# Patient Record
Sex: MALE | HISPANIC OR LATINO | Employment: OTHER | URBAN - METROPOLITAN AREA
[De-identification: names, ages, dates, MRNs, and addresses within clinical notes are randomized per-mention and may not be internally consistent; named-entity substitution may affect disease eponyms.]

---

## 2020-05-06 ENCOUNTER — HOSPITAL ENCOUNTER (OUTPATIENT)
Facility: MEDICAL CENTER | Age: 65
End: 2020-05-07
Attending: EMERGENCY MEDICINE | Admitting: HOSPITALIST
Payer: COMMERCIAL

## 2020-05-06 DIAGNOSIS — R73.9 HYPERGLYCEMIA: ICD-10-CM

## 2020-05-06 LAB
ALBUMIN SERPL BCP-MCNC: 4 G/DL (ref 3.2–4.9)
ALBUMIN/GLOB SERPL: 1.4 G/DL
ALP SERPL-CCNC: 162 U/L (ref 30–99)
ALT SERPL-CCNC: 19 U/L (ref 2–50)
ANION GAP SERPL CALC-SCNC: 13 MMOL/L (ref 7–16)
APPEARANCE UR: CLEAR
AST SERPL-CCNC: 16 U/L (ref 12–45)
BASOPHILS # BLD AUTO: 0.7 % (ref 0–1.8)
BASOPHILS # BLD: 0.05 K/UL (ref 0–0.12)
BILIRUB SERPL-MCNC: 0.6 MG/DL (ref 0.1–1.5)
BILIRUB UR QL STRIP.AUTO: NEGATIVE
BUN SERPL-MCNC: 16 MG/DL (ref 8–22)
CALCIUM SERPL-MCNC: 8.7 MG/DL (ref 8.5–10.5)
CHLORIDE SERPL-SCNC: 90 MMOL/L (ref 96–112)
CO2 SERPL-SCNC: 23 MMOL/L (ref 20–33)
COLOR UR: YELLOW
CREAT SERPL-MCNC: 0.97 MG/DL (ref 0.5–1.4)
EOSINOPHIL # BLD AUTO: 0.46 K/UL (ref 0–0.51)
EOSINOPHIL NFR BLD: 6.2 % (ref 0–6.9)
ERYTHROCYTE [DISTWIDTH] IN BLOOD BY AUTOMATED COUNT: 38.8 FL (ref 35.9–50)
GLOBULIN SER CALC-MCNC: 2.9 G/DL (ref 1.9–3.5)
GLUCOSE BLD-MCNC: 333 MG/DL (ref 65–99)
GLUCOSE BLD-MCNC: 453 MG/DL (ref 65–99)
GLUCOSE BLD-MCNC: 520 MG/DL (ref 65–99)
GLUCOSE BLD-MCNC: >600 MG/DL (ref 65–99)
GLUCOSE SERPL-MCNC: 672 MG/DL (ref 65–99)
GLUCOSE UR STRIP.AUTO-MCNC: >=1000 MG/DL
HCT VFR BLD AUTO: 45.8 % (ref 42–52)
HGB BLD-MCNC: 16.5 G/DL (ref 14–18)
IMM GRANULOCYTES # BLD AUTO: 0.02 K/UL (ref 0–0.11)
IMM GRANULOCYTES NFR BLD AUTO: 0.3 % (ref 0–0.9)
KETONES UR STRIP.AUTO-MCNC: NEGATIVE MG/DL
LEUKOCYTE ESTERASE UR QL STRIP.AUTO: NEGATIVE
LYMPHOCYTES # BLD AUTO: 1.98 K/UL (ref 1–4.8)
LYMPHOCYTES NFR BLD: 26.8 % (ref 22–41)
MAGNESIUM SERPL-MCNC: 1.8 MG/DL (ref 1.5–2.5)
MCH RBC QN AUTO: 30.1 PG (ref 27–33)
MCHC RBC AUTO-ENTMCNC: 36 G/DL (ref 33.7–35.3)
MCV RBC AUTO: 83.6 FL (ref 81.4–97.8)
MICRO URNS: ABNORMAL
MONOCYTES # BLD AUTO: 0.69 K/UL (ref 0–0.85)
MONOCYTES NFR BLD AUTO: 9.3 % (ref 0–13.4)
NEUTROPHILS # BLD AUTO: 4.19 K/UL (ref 1.82–7.42)
NEUTROPHILS NFR BLD: 56.7 % (ref 44–72)
NITRITE UR QL STRIP.AUTO: NEGATIVE
NRBC # BLD AUTO: 0 K/UL
NRBC BLD-RTO: 0 /100 WBC
PH UR STRIP.AUTO: 5 [PH] (ref 5–8)
PLATELET # BLD AUTO: 212 K/UL (ref 164–446)
PMV BLD AUTO: 11.1 FL (ref 9–12.9)
POTASSIUM SERPL-SCNC: 4.2 MMOL/L (ref 3.6–5.5)
PROT SERPL-MCNC: 6.9 G/DL (ref 6–8.2)
PROT UR QL STRIP: NEGATIVE MG/DL
RBC # BLD AUTO: 5.48 M/UL (ref 4.7–6.1)
RBC UR QL AUTO: NEGATIVE
SODIUM SERPL-SCNC: 126 MMOL/L (ref 135–145)
SP GR UR STRIP.AUTO: 1.03
UROBILINOGEN UR STRIP.AUTO-MCNC: 0.2 MG/DL
WBC # BLD AUTO: 7.4 K/UL (ref 4.8–10.8)

## 2020-05-06 PROCEDURE — G0378 HOSPITAL OBSERVATION PER HR: HCPCS

## 2020-05-06 PROCEDURE — 700102 HCHG RX REV CODE 250 W/ 637 OVERRIDE(OP): Performed by: EMERGENCY MEDICINE

## 2020-05-06 PROCEDURE — 99220 PR INITIAL OBSERVATION CARE,LEVL III: CPT | Mod: GC | Performed by: HOSPITALIST

## 2020-05-06 PROCEDURE — 96366 THER/PROPH/DIAG IV INF ADDON: CPT

## 2020-05-06 PROCEDURE — 96375 TX/PRO/DX INJ NEW DRUG ADDON: CPT

## 2020-05-06 PROCEDURE — 700105 HCHG RX REV CODE 258: Performed by: STUDENT IN AN ORGANIZED HEALTH CARE EDUCATION/TRAINING PROGRAM

## 2020-05-06 PROCEDURE — 700102 HCHG RX REV CODE 250 W/ 637 OVERRIDE(OP): Performed by: STUDENT IN AN ORGANIZED HEALTH CARE EDUCATION/TRAINING PROGRAM

## 2020-05-06 PROCEDURE — 80053 COMPREHEN METABOLIC PANEL: CPT

## 2020-05-06 PROCEDURE — 83735 ASSAY OF MAGNESIUM: CPT

## 2020-05-06 PROCEDURE — 81003 URINALYSIS AUTO W/O SCOPE: CPT

## 2020-05-06 PROCEDURE — 700111 HCHG RX REV CODE 636 W/ 250 OVERRIDE (IP): Performed by: STUDENT IN AN ORGANIZED HEALTH CARE EDUCATION/TRAINING PROGRAM

## 2020-05-06 PROCEDURE — 85025 COMPLETE CBC W/AUTO DIFF WBC: CPT

## 2020-05-06 PROCEDURE — 96365 THER/PROPH/DIAG IV INF INIT: CPT

## 2020-05-06 PROCEDURE — 36415 COLL VENOUS BLD VENIPUNCTURE: CPT

## 2020-05-06 PROCEDURE — 99285 EMERGENCY DEPT VISIT HI MDM: CPT

## 2020-05-06 PROCEDURE — 96372 THER/PROPH/DIAG INJ SC/IM: CPT

## 2020-05-06 PROCEDURE — 96374 THER/PROPH/DIAG INJ IV PUSH: CPT

## 2020-05-06 PROCEDURE — 82962 GLUCOSE BLOOD TEST: CPT | Mod: 91

## 2020-05-06 RX ORDER — INSULIN GLARGINE 100 [IU]/ML
0.2 INJECTION, SOLUTION SUBCUTANEOUS EVERY EVENING
Status: DISCONTINUED | OUTPATIENT
Start: 2020-05-06 | End: 2020-05-07 | Stop reason: HOSPADM

## 2020-05-06 RX ORDER — BISACODYL 10 MG
10 SUPPOSITORY, RECTAL RECTAL
Status: DISCONTINUED | OUTPATIENT
Start: 2020-05-06 | End: 2020-05-07 | Stop reason: HOSPADM

## 2020-05-06 RX ORDER — AMOXICILLIN 250 MG
2 CAPSULE ORAL 2 TIMES DAILY
Status: DISCONTINUED | OUTPATIENT
Start: 2020-05-06 | End: 2020-05-07 | Stop reason: HOSPADM

## 2020-05-06 RX ORDER — INSULIN GLARGINE 100 [IU]/ML
0.2 INJECTION, SOLUTION SUBCUTANEOUS EVERY EVENING
Status: DISCONTINUED | OUTPATIENT
Start: 2020-05-06 | End: 2020-05-06

## 2020-05-06 RX ORDER — MAGNESIUM SULFATE HEPTAHYDRATE 40 MG/ML
2 INJECTION, SOLUTION INTRAVENOUS ONCE
Status: COMPLETED | OUTPATIENT
Start: 2020-05-06 | End: 2020-05-06

## 2020-05-06 RX ORDER — SODIUM CHLORIDE, SODIUM LACTATE, POTASSIUM CHLORIDE, CALCIUM CHLORIDE 600; 310; 30; 20 MG/100ML; MG/100ML; MG/100ML; MG/100ML
1000 INJECTION, SOLUTION INTRAVENOUS CONTINUOUS
Status: DISCONTINUED | OUTPATIENT
Start: 2020-05-06 | End: 2020-05-07

## 2020-05-06 RX ORDER — DEXTROSE MONOHYDRATE 25 G/50ML
50 INJECTION, SOLUTION INTRAVENOUS
Status: DISCONTINUED | OUTPATIENT
Start: 2020-05-06 | End: 2020-05-06 | Stop reason: ALTCHOICE

## 2020-05-06 RX ORDER — DEXTROSE MONOHYDRATE 25 G/50ML
50 INJECTION, SOLUTION INTRAVENOUS
Status: DISCONTINUED | OUTPATIENT
Start: 2020-05-06 | End: 2020-05-07 | Stop reason: HOSPADM

## 2020-05-06 RX ORDER — POLYETHYLENE GLYCOL 3350 17 G/17G
1 POWDER, FOR SOLUTION ORAL
Status: DISCONTINUED | OUTPATIENT
Start: 2020-05-06 | End: 2020-05-07 | Stop reason: HOSPADM

## 2020-05-06 RX ADMIN — INSULIN LISPRO 10 UNITS: 100 INJECTION, SOLUTION INTRAVENOUS; SUBCUTANEOUS at 21:45

## 2020-05-06 RX ADMIN — MAGNESIUM SULFATE 2 G: 2 INJECTION INTRAVENOUS at 16:59

## 2020-05-06 RX ADMIN — SODIUM CHLORIDE, POTASSIUM CHLORIDE, SODIUM LACTATE AND CALCIUM CHLORIDE 1000 ML: 600; 310; 30; 20 INJECTION, SOLUTION INTRAVENOUS at 19:27

## 2020-05-06 RX ADMIN — ENOXAPARIN SODIUM 40 MG: 100 INJECTION SUBCUTANEOUS at 16:58

## 2020-05-06 RX ADMIN — INSULIN GLARGINE 16 UNITS: 100 INJECTION, SOLUTION SUBCUTANEOUS at 19:27

## 2020-05-06 RX ADMIN — INSULIN HUMAN 5 UNITS: 100 INJECTION, SOLUTION PARENTERAL at 17:10

## 2020-05-06 RX ADMIN — INSULIN HUMAN 5 UNITS: 100 INJECTION, SOLUTION PARENTERAL at 13:31

## 2020-05-06 ASSESSMENT — ENCOUNTER SYMPTOMS
LOSS OF CONSCIOUSNESS: 0
ABDOMINAL PAIN: 0
WEAKNESS: 0
WEIGHT LOSS: 0
TINGLING: 0
FALLS: 0
PALPITATIONS: 0
SORE THROAT: 0
HALLUCINATIONS: 0
WHEEZING: 0
BRUISES/BLEEDS EASILY: 0
DIARRHEA: 0
HEADACHES: 0
DOUBLE VISION: 0
FOCAL WEAKNESS: 0
MEMORY LOSS: 0
VOMITING: 0
SPUTUM PRODUCTION: 0
SHORTNESS OF BREATH: 0
SINUS PAIN: 0
ORTHOPNEA: 0
BLURRED VISION: 0
POLYDIPSIA: 1
COUGH: 0
BLOOD IN STOOL: 0
FEVER: 0
EYE PAIN: 0
NERVOUS/ANXIOUS: 0
FLANK PAIN: 0
HEMOPTYSIS: 0
CHILLS: 0
NAUSEA: 0
EYE DISCHARGE: 0
MYALGIAS: 0
DIZZINESS: 0

## 2020-05-06 ASSESSMENT — LIFESTYLE VARIABLES: SUBSTANCE_ABUSE: 0

## 2020-05-06 NOTE — ED PROVIDER NOTES
"ED Provider Note    CHIEF COMPLAINT  Chief Complaint   Patient presents with   • High Blood Sugar     HIGH on reading, hasn;t taken insulin for 2 months due to insurance issues.  takes metformin       HPI  Chino Deluca is a 65 y.o. male Type 2 diabetic who presents complaining of high blood sugar.    Patient reports nausea but denies vomiting, fever, chills, diarrhea, abdominal pain.  He continued to take his metformin 500 mg twice daily but was unable to obtain insulin for the last 2 months secondary to insurance issues.      ALLERGIES  No Known Allergies    CURRENT MEDICATIONS  Home Medications     Reviewed by Do Diez (Pharmacy Tech) on 05/06/20 at 1255  Med List Status: Complete   Medication Last Dose Status        Patient Richard Taking any Medications                       PAST MEDICAL HISTORY   has a past medical history of Diabetes (HCC), High cholesterol, and Hypertension.    SURGICAL HISTORY   has a past surgical history that includes other.    SOCIAL HISTORY  Social History     Tobacco Use   • Smoking status: Never Smoker   • Smokeless tobacco: Never Used   Substance and Sexual Activity   • Alcohol use: No   • Drug use: No   • Sexual activity: Yes     Partners: Female       REVIEW OF SYSTEMS  See HPI for further details.  All other systems are negative except as above in HPI.      PHYSICAL EXAM  VITAL SIGNS: /70   Pulse 81   Temp 35.8 °C (96.5 °F) (Temporal)   Resp 17   Ht 1.676 m (5' 6\")   Wt 82.4 kg (181 lb 10.5 oz)   SpO2 91%   BMI 29.32 kg/m²     General:  WDWN, nontoxic appearing in NAD; A+Ox3; V/S as above   Skin: warm and dry; good color; no rash  HEENT: NCAT; EOMs intact; PERRL; no scleral icterus   Neck: FROM; no meningismus, soft  Cardiovascular: Regular heart rate and rhythm.  No murmurs, rubs, or gallops; pulses 2+ bilaterally radially  Lungs: Clear to auscultation with good air movement bilaterally.  No wheezes, rhonchi, or rales.   Abdomen: BS present; " soft; NTND; no rebound, guarding, or rigidity.  No organomegaly or pulsatile mass  Extremities: BLISS x 4; no e/o trauma; no pedal edema  Neurologic: CNs III-XII grossly intact; speech clear;  strength 5/5 UE/LEs  Psychiatric: Appropriate affect, normal mood    LABS  Results for orders placed or performed during the hospital encounter of 05/06/20   CBC WITH DIFFERENTIAL   Result Value Ref Range    WBC 7.4 4.8 - 10.8 K/uL    RBC 5.48 4.70 - 6.10 M/uL    Hemoglobin 16.5 14.0 - 18.0 g/dL    Hematocrit 45.8 42.0 - 52.0 %    MCV 83.6 81.4 - 97.8 fL    MCH 30.1 27.0 - 33.0 pg    MCHC 36.0 (H) 33.7 - 35.3 g/dL    RDW 38.8 35.9 - 50.0 fL    Platelet Count 212 164 - 446 K/uL    MPV 11.1 9.0 - 12.9 fL    Neutrophils-Polys 56.70 44.00 - 72.00 %    Lymphocytes 26.80 22.00 - 41.00 %    Monocytes 9.30 0.00 - 13.40 %    Eosinophils 6.20 0.00 - 6.90 %    Basophils 0.70 0.00 - 1.80 %    Immature Granulocytes 0.30 0.00 - 0.90 %    Nucleated RBC 0.00 /100 WBC    Neutrophils (Absolute) 4.19 1.82 - 7.42 K/uL    Lymphs (Absolute) 1.98 1.00 - 4.80 K/uL    Monos (Absolute) 0.69 0.00 - 0.85 K/uL    Eos (Absolute) 0.46 0.00 - 0.51 K/uL    Baso (Absolute) 0.05 0.00 - 0.12 K/uL    Immature Granulocytes (abs) 0.02 0.00 - 0.11 K/uL    NRBC (Absolute) 0.00 K/uL   COMP METABOLIC PANEL   Result Value Ref Range    Sodium 126 (L) 135 - 145 mmol/L    Potassium 4.2 3.6 - 5.5 mmol/L    Chloride 90 (L) 96 - 112 mmol/L    Co2 23 20 - 33 mmol/L    Anion Gap 13.0 7.0 - 16.0    Glucose 672 (HH) 65 - 99 mg/dL    Bun 16 8 - 22 mg/dL    Creatinine 0.97 0.50 - 1.40 mg/dL    Calcium 8.7 8.5 - 10.5 mg/dL    AST(SGOT) 16 12 - 45 U/L    ALT(SGPT) 19 2 - 50 U/L    Alkaline Phosphatase 162 (H) 30 - 99 U/L    Total Bilirubin 0.6 0.1 - 1.5 mg/dL    Albumin 4.0 3.2 - 4.9 g/dL    Total Protein 6.9 6.0 - 8.2 g/dL    Globulin 2.9 1.9 - 3.5 g/dL    A-G Ratio 1.4 g/dL   URINALYSIS (UA)   Result Value Ref Range    Micro Urine Req see below    ESTIMATED GFR   Result Value Ref  Range    GFR If African American >60 >60 mL/min/1.73 m 2    GFR If Non African American >60 >60 mL/min/1.73 m 2   ACCU-CHEK GLUCOSE   Result Value Ref Range    Glucose - Accu-Ck >600 (HH) 65 - 99 mg/dL         MEDICAL RECORD  I have reviewed patient's medical record and pertinent results are listed below.      COURSE & MEDICAL DECISION MAKING  I have reviewed any medical record information, laboratory studies and radiographic results as noted.    Chino Deluca is a 65 y.o. type II diabetic male who presents complaining of elevated blood sugars.  Unable to obtain insulin.  No evidence of or concern for infection.  Patient is not altered.      NS bolus was ordered for treatment of patient's hyperglycemia.    Labs demonstrate glucose of 672 with hyponatremia of 126 and alk phos 162.  No anion gap or acidosis noted.    Insulin 5 mg IV ordered    1:56 PM  I discussed the case with the [facility] internal medicine resident who agrees to hospitalize the patient.      FINAL IMPRESSION  1. Hyperglycemia           Electronically signed by: Yessi Go M.D., 5/6/2020 12:25 PM

## 2020-05-06 NOTE — ASSESSMENT & PLAN NOTE
- Patient reports several days of polyuria and polydipsia.  Patient has been without insulin for roughly 2 months.   - On presentation glucose 672, chloride 90, sodium of 126 (corrected 135), potassium 4.2, anion gap of 13, UA noted glucosuria  - 10 units of insulin IV given in ED    Plan:  - Blood sugars are in better control  -- Can be discharged today  - Diabetic educator will see the patient today

## 2020-05-06 NOTE — ED TRIAGE NOTES
Pt ambs to triage,  line used  Chief Complaint   Patient presents with   • High Blood Sugar     HIGH on reading, hasn;t taken insulin for 2 months due to insurance issues.  takes metformin   Pt has no complaints, had routine blood work done and was called by the clinic this AM to come in for sugars >500    Denies cough, fever, sob or +CV contacts.  reports he takes all his other medications as prescribed.

## 2020-05-06 NOTE — ED NOTES
Med Rec complete per phone interview with pt and [facility] pharmacy  Allergies Reviewed  No ABX in the last 14 days    Pt states that he has not been taking oral medications in over 2 months.   Pt states that he has been out of  insulin for over 1 month.    Last filled HUMULIN 70/30 in Nov 2019  Instructions at that time 20 units, twice daily

## 2020-05-06 NOTE — SENIOR ADMIT NOTE
"SENIOR ADMIT NOTE:    Bella Lovelace M.D.  Date & Time note created:    5/6/2020   4:01 PM       Chief Complaint:  Hyperglycemia    History of Present Illness:    The patient is a 65 year-old-male with a history of IDDM type 2, HLD, HTN, and heart valve replacement presented to the ED on 5/6/2020 for hyperglycemia that was seen at the [facility]. The patient states that he ran out of his medications weeks ago and has not been taking them. He is on metformin daily with an unknown dose of lantus and SSI. His last A1c in 2016 was 15.5.     Physical Exam:  Weight/BMI: Body mass index is 29.32 kg/m².  /73   Pulse 74   Temp 35.8 °C (96.5 °F) (Temporal)   Resp 13   Ht 1.676 m (5' 6\")   Wt 82.4 kg (181 lb 10.5 oz)   SpO2 94%   Vitals:    05/06/20 1351 05/06/20 1401 05/06/20 1431 05/06/20 1442   BP:  132/69 133/73    Pulse: 81 81  74   Resp: 17 16  13   Temp:       TempSrc:       SpO2: 91% 90%  94%   Weight:       Height:         Oxygen Therapy:  Pulse Oximetry: 94 %    Physical exam:  Constitutional:  No acute distress. A&O x3  HENMT:  Normocephalic, Atraumatic  Eyes:  PERRLA, EOMI, Conjunctiva normal  Neck:  Supple, Full range of motion, No stridor  Cardiovascular:  Regular rate and rhythm, No murmurs, No rubs, No gallops.   Lungs: Respiratory effort is normal, no crackles, no wheezing.  Abdomen: Bowel sounds x4, Soft, Non-tender, Non-distended, No guarding, No rebound, No masses.    ASSESSMENT/PLAN:  # Hyperglycemia  - The patient's blood sugar on admission was 672. Hyponatremia corrected was 135. He received 10U of IV Insulin in the ED.   - Not acidotic, so OK to admit to telemetry.  - Continue to monitor with Q4h blood sugars  - Start Lantus 16U weight based and SSI. The patient already received 10U of IV insulin  - Ordered osmolality serum  - A1c pending  - Lipids pending  - Continue LR at 100cc/hr.    For full details please refer to H&P done by Dr. Michelle Lovelace M.D.    "

## 2020-05-06 NOTE — ASSESSMENT & PLAN NOTE
-Patient not currently taking any cholesterol-lowering medications.  - Started Atorvastatin 40mg daily.

## 2020-05-06 NOTE — ED NOTES
PIV established, blood drawn and sent to lab, urinal at bedside, patient aware we need urine specimen.

## 2020-05-06 NOTE — H&P
History & Physical Note    Date of Admission: 5/6/2020  Admission Status: Observation-Outpatient  [facility] Team: [facility] IM Yellow Team  Attending: Dr. Vance  Senior Resident: Dr. Lovelace  Intern: Dr. Martinez  Contact Number: [phone]    Chief Complaint: Symptomatic Hyperglycemia w/o ketosis      History of Present Illness (HPI):   Chino is a 65 y.o. male who presented 5/6/2020 with past medical history of insulin-dependent DM, HLD, HTN & heart valve replacement (unsure of which valve but not requiring anticoagulation) who presents to the [facility] ED after being seen at the [facility].  Patient is Japanese-speaking and a translation iPad was used during the encounter.  Patient was seen at the [facility] and was noted to have an elevated glucose.  The clinic did not have insulin on hand and the patient was sent to the ED.  He reports that he ran out of insulin several weeks ago due to an insurance issue.  He has had several year history of DM and does not monitor his glucose readings at home.  He has been insulin-dependent for several years.  Patient reports polyuria polydipsia.  He denies any nausea, vomiting, diarrhea, fever, chills, recent sickness, recent sick contacts.  Patient visited [place] in January of this year but has not traveled outside of [place] since that time.  His only other medication is aspirin which she takes daily.    In ED vital signs stable.  UA positive for glucose, sodium of 126 (corrected 135), chloride 90, glucose 672, potassium 4.2, anion gap 13.  WBC is 7.4 and hemoglobin of 16.5.  Patient was given 5 units of regular insulin.    Review of Systems:   Review of Systems   Constitutional: Negative for chills, fever, malaise/fatigue and weight loss.   HENT: Negative for congestion, ear discharge, sinus pain and sore throat.    Eyes: Negative for blurred vision, double vision, pain and discharge.   Respiratory: Negative for cough, hemoptysis, sputum production, shortness of breath and wheezing.     Cardiovascular: Negative for chest pain, palpitations, orthopnea and leg swelling.   Gastrointestinal: Negative for abdominal pain, blood in stool, diarrhea, melena, nausea and vomiting.   Genitourinary: Positive for frequency. Negative for dysuria, flank pain and hematuria.   Musculoskeletal: Negative for falls and myalgias.   Skin: Negative for rash.   Neurological: Negative for dizziness, tingling, focal weakness, loss of consciousness, weakness and headaches.   Endo/Heme/Allergies: Positive for polydipsia. Does not bruise/bleed easily.   Psychiatric/Behavioral: Negative for hallucinations, memory loss, substance abuse and suicidal ideas. The patient is not nervous/anxious.          Past Medical History:   Past Medical History was reviewed with patient.   has a past medical history of Diabetes (HCC), High cholesterol, and Hypertension.    Past Surgical History: Past Surgical History was reviewed with patient.   has a past surgical history that includes other.   Heart Valve replacement (unsure of which valve)    Medications: Medications have been reviewed with patient.  None   Patient reports: Insulin (unsure of which type) and daily ASA.     Allergies: Allergies have been reviewed with patient.  No Known Allergies    Family History: No fam hx of DM.  family history is not on file.     Social History:   Tobacco: None   Alcohol: None    Recreational drugs (illegal and prescription):  None    Employment: [employer]  Activity Level: moderate   Living situation:  stable  Recent travel:  [place] in January  Primary Care Provider: reviewed Cornel Diego P.A.-C.  Other (stressors, spirituality, exposures):  None  Physical Exam:   Vitals:  Temp:  [35.8 °C (96.5 °F)] 35.8 °C (96.5 °F)  Pulse:  [74-88] 74  Resp:  [13-20] 13  BP: (132-179)/(66-84) 133/73  SpO2:  [90 %-94 %] 94 %    Physical Exam  Vitals signs and nursing note reviewed.   Constitutional:       General: He is not in acute distress.      Appearance: He is not diaphoretic.   HENT:      Head: Normocephalic and atraumatic.      Nose: Nose normal. No congestion.      Mouth/Throat:      Pharynx: Oropharynx is clear. No oropharyngeal exudate or posterior oropharyngeal erythema.   Eyes:      General: No scleral icterus.     Extraocular Movements: Extraocular movements intact.      Conjunctiva/sclera: Conjunctivae normal.      Pupils: Pupils are equal, round, and reactive to light.   Neck:      Musculoskeletal: Normal range of motion and neck supple.   Cardiovascular:      Rate and Rhythm: Normal rate and regular rhythm.      Pulses: Normal pulses.      Heart sounds: Normal heart sounds. No murmur.   Pulmonary:      Effort: No respiratory distress.      Breath sounds: No wheezing, rhonchi or rales.   Abdominal:      General: Bowel sounds are normal. There is no distension.      Palpations: Abdomen is soft. There is no mass.      Tenderness: There is no abdominal tenderness. There is no guarding.   Musculoskeletal: Normal range of motion.         General: No swelling, tenderness or deformity.      Right lower leg: No edema.      Left lower leg: No edema.   Lymphadenopathy:      Cervical: No cervical adenopathy.   Skin:     General: Skin is warm.      Coloration: Skin is not jaundiced or pale.      Findings: No erythema.      Comments: Well healed sternotomy incision   Neurological:      General: No focal deficit present.      Mental Status: He is alert and oriented to person, place, and time.      Cranial Nerves: No cranial nerve deficit.      Sensory: No sensory deficit.      Motor: No weakness.      Coordination: Coordination normal.   Psychiatric:         Mood and Affect: Mood normal.         Behavior: Behavior normal.         Thought Content: Thought content normal.         Labs:   Recent Labs     05/06/20  1230   WBC 7.4   RBC 5.48   HEMOGLOBIN 16.5   HEMATOCRIT 45.8   MCV 83.6   MCH 30.1   RDW 38.8   PLATELETCT 212   MPV 11.1   NEUTSPOLYS 56.70    LYMPHOCYTES 26.80   MONOCYTES 9.30   EOSINOPHILS 6.20   BASOPHILS 0.70     Recent Labs     05/06/20  1230   SODIUM 126*   POTASSIUM 4.2   CHLORIDE 90*   CO2 23   GLUCOSE 672*   BUN 16     Recent Labs     05/06/20  1230   ALBUMIN 4.0   TBILIRUBIN 0.6   ALKPHOSPHAT 162*   TOTPROTEIN 6.9   ALTSGPT 19   ASTSGOT 16   CREATININE 0.97       Previous Data Review: reviewed    * Hyperglycemia without ketosis  Assessment & Plan  - Patient reports several days of polyuria and polydipsia.  Patient has been without insulin for roughly 2 months.   - On presentation glucose 672, chloride 90, sodium of 126 (corrected 135), potassium 4.2, anion gap of 13, UA noted glucosuria  - 5 units of insulin given in ED  Plan:  - Admit to telemetry  - 5 units of insulin in addition to 5 units given in ED  - 500 cc bolus of lactated Ringer's  - Diabetic diet  - Hemoglobin A1c in a.m.  - 16 units of insulin glargine and correctional scale of insulin lispro  - Hypoglycemia protocol in place    HLD (hyperlipidemia)- (present on admission)  Assessment & Plan  -Patient not currently taking any cholesterol-lowering medications.  - Lipid panel in a.m.    HTN (hypertension)- (present on admission)  Assessment & Plan  -Previously listed diagnoses.  Patient is not currently taking any antihypertensives.

## 2020-05-06 NOTE — PROGRESS NOTES
2 RN skin check complete with Jinny PUCKETT  Devices in place N/a.  Skin assessed under devices n/a.  Confirmed pressure ulcers found on n/a.  New potential pressure ulcers noted on n/a. Wound consult placed n/a.  The following interventions in place freq reminders to turn, moisturizer.    BL ears red/flaky/blanching  BL elbows red/blanching  BL shins dusky  BL heels dry/flaky  Generalized scarring on LE

## 2020-05-07 ENCOUNTER — PATIENT OUTREACH (OUTPATIENT)
Dept: HEALTH INFORMATION MANAGEMENT | Facility: OTHER | Age: 65
End: 2020-05-07

## 2020-05-07 VITALS
SYSTOLIC BLOOD PRESSURE: 137 MMHG | TEMPERATURE: 96.9 F | BODY MASS INDEX: 29.2 KG/M2 | OXYGEN SATURATION: 93 % | RESPIRATION RATE: 17 BRPM | HEART RATE: 74 BPM | DIASTOLIC BLOOD PRESSURE: 76 MMHG | WEIGHT: 181.66 LBS | HEIGHT: 66 IN

## 2020-05-07 LAB
ALBUMIN SERPL BCP-MCNC: 3.6 G/DL (ref 3.2–4.9)
ALBUMIN/GLOB SERPL: 1.4 G/DL
ALP SERPL-CCNC: 92 U/L (ref 30–99)
ALT SERPL-CCNC: 17 U/L (ref 2–50)
ANION GAP SERPL CALC-SCNC: 12 MMOL/L (ref 7–16)
AST SERPL-CCNC: 14 U/L (ref 12–45)
BASOPHILS # BLD AUTO: 0.8 % (ref 0–1.8)
BASOPHILS # BLD: 0.06 K/UL (ref 0–0.12)
BILIRUB SERPL-MCNC: 0.4 MG/DL (ref 0.1–1.5)
BUN SERPL-MCNC: 14 MG/DL (ref 8–22)
CALCIUM SERPL-MCNC: 8.9 MG/DL (ref 8.5–10.5)
CHLORIDE SERPL-SCNC: 101 MMOL/L (ref 96–112)
CHOLEST SERPL-MCNC: 183 MG/DL (ref 100–199)
CO2 SERPL-SCNC: 23 MMOL/L (ref 20–33)
CREAT SERPL-MCNC: 0.77 MG/DL (ref 0.5–1.4)
EOSINOPHIL # BLD AUTO: 0.44 K/UL (ref 0–0.51)
EOSINOPHIL NFR BLD: 5.7 % (ref 0–6.9)
ERYTHROCYTE [DISTWIDTH] IN BLOOD BY AUTOMATED COUNT: 40 FL (ref 35.9–50)
EST. AVERAGE GLUCOSE BLD GHB EST-MCNC: 447 MG/DL
GLOBULIN SER CALC-MCNC: 2.5 G/DL (ref 1.9–3.5)
GLUCOSE BLD-MCNC: 202 MG/DL (ref 65–99)
GLUCOSE BLD-MCNC: 295 MG/DL (ref 65–99)
GLUCOSE SERPL-MCNC: 185 MG/DL (ref 65–99)
HBA1C MFR BLD: 17.2 % (ref 0–5.6)
HCT VFR BLD AUTO: 42.1 % (ref 42–52)
HDLC SERPL-MCNC: 31 MG/DL
HGB BLD-MCNC: 14.8 G/DL (ref 14–18)
IMM GRANULOCYTES # BLD AUTO: 0.01 K/UL (ref 0–0.11)
IMM GRANULOCYTES NFR BLD AUTO: 0.1 % (ref 0–0.9)
LDLC SERPL CALC-MCNC: ABNORMAL MG/DL
LYMPHOCYTES # BLD AUTO: 3.09 K/UL (ref 1–4.8)
LYMPHOCYTES NFR BLD: 40 % (ref 22–41)
MAGNESIUM SERPL-MCNC: 1.6 MG/DL (ref 1.5–2.5)
MCH RBC QN AUTO: 29.5 PG (ref 27–33)
MCHC RBC AUTO-ENTMCNC: 35.2 G/DL (ref 33.7–35.3)
MCV RBC AUTO: 83.9 FL (ref 81.4–97.8)
MONOCYTES # BLD AUTO: 0.78 K/UL (ref 0–0.85)
MONOCYTES NFR BLD AUTO: 10.1 % (ref 0–13.4)
NEUTROPHILS # BLD AUTO: 3.35 K/UL (ref 1.82–7.42)
NEUTROPHILS NFR BLD: 43.3 % (ref 44–72)
NRBC # BLD AUTO: 0 K/UL
NRBC BLD-RTO: 0 /100 WBC
OSMOLALITY SERPL: 289 MOSM/KG H2O (ref 278–298)
PLATELET # BLD AUTO: 208 K/UL (ref 164–446)
PMV BLD AUTO: 10.6 FL (ref 9–12.9)
POTASSIUM SERPL-SCNC: 3.3 MMOL/L (ref 3.6–5.5)
PROT SERPL-MCNC: 6.1 G/DL (ref 6–8.2)
RBC # BLD AUTO: 5.02 M/UL (ref 4.7–6.1)
SODIUM SERPL-SCNC: 136 MMOL/L (ref 135–145)
TRIGL SERPL-MCNC: 587 MG/DL (ref 0–149)
WBC # BLD AUTO: 7.7 K/UL (ref 4.8–10.8)

## 2020-05-07 PROCEDURE — 85025 COMPLETE CBC W/AUTO DIFF WBC: CPT

## 2020-05-07 PROCEDURE — A9270 NON-COVERED ITEM OR SERVICE: HCPCS | Performed by: STUDENT IN AN ORGANIZED HEALTH CARE EDUCATION/TRAINING PROGRAM

## 2020-05-07 PROCEDURE — 99217 PR OBSERVATION CARE DISCHARGE: CPT | Mod: GC | Performed by: HOSPITALIST

## 2020-05-07 PROCEDURE — 700105 HCHG RX REV CODE 258: Performed by: STUDENT IN AN ORGANIZED HEALTH CARE EDUCATION/TRAINING PROGRAM

## 2020-05-07 PROCEDURE — 96366 THER/PROPH/DIAG IV INF ADDON: CPT

## 2020-05-07 PROCEDURE — G0378 HOSPITAL OBSERVATION PER HR: HCPCS

## 2020-05-07 PROCEDURE — 96367 TX/PROPH/DG ADDL SEQ IV INF: CPT

## 2020-05-07 PROCEDURE — 83735 ASSAY OF MAGNESIUM: CPT

## 2020-05-07 PROCEDURE — 82962 GLUCOSE BLOOD TEST: CPT

## 2020-05-07 PROCEDURE — 83930 ASSAY OF BLOOD OSMOLALITY: CPT

## 2020-05-07 PROCEDURE — 700111 HCHG RX REV CODE 636 W/ 250 OVERRIDE (IP): Performed by: STUDENT IN AN ORGANIZED HEALTH CARE EDUCATION/TRAINING PROGRAM

## 2020-05-07 PROCEDURE — 83036 HEMOGLOBIN GLYCOSYLATED A1C: CPT

## 2020-05-07 PROCEDURE — 80061 LIPID PANEL: CPT

## 2020-05-07 PROCEDURE — 36415 COLL VENOUS BLD VENIPUNCTURE: CPT

## 2020-05-07 PROCEDURE — 700102 HCHG RX REV CODE 250 W/ 637 OVERRIDE(OP): Performed by: STUDENT IN AN ORGANIZED HEALTH CARE EDUCATION/TRAINING PROGRAM

## 2020-05-07 PROCEDURE — 80053 COMPREHEN METABOLIC PANEL: CPT

## 2020-05-07 PROCEDURE — 96372 THER/PROPH/DIAG INJ SC/IM: CPT

## 2020-05-07 RX ORDER — ASPIRIN 81 MG/1
81 TABLET ORAL DAILY
Qty: 30 TAB | Refills: 0 | Status: SHIPPED | OUTPATIENT
Start: 2020-05-08 | End: 2020-05-07

## 2020-05-07 RX ORDER — GLUCOSAMINE HCL/CHONDROITIN SU 500-400 MG
CAPSULE ORAL
Qty: 100 EACH | Refills: 0 | Status: SHIPPED | OUTPATIENT
Start: 2020-05-07 | End: 2020-08-12

## 2020-05-07 RX ORDER — LANCETS 30 GAUGE
EACH MISCELLANEOUS
Qty: 100 EACH | Refills: 0 | Status: SHIPPED | OUTPATIENT
Start: 2020-05-07 | End: 2020-05-07 | Stop reason: SDUPTHER

## 2020-05-07 RX ORDER — DEXTROSE MONOHYDRATE, SODIUM CHLORIDE, AND POTASSIUM CHLORIDE 50; 1.49; 9 G/1000ML; G/1000ML; G/1000ML
INJECTION, SOLUTION INTRAVENOUS
Status: COMPLETED
Start: 2020-05-07 | End: 2020-05-07

## 2020-05-07 RX ORDER — GLUCOSAMINE HCL/CHONDROITIN SU 500-400 MG
CAPSULE ORAL
Qty: 100 EACH | Refills: 0 | Status: SHIPPED | OUTPATIENT
Start: 2020-05-07 | End: 2020-05-07 | Stop reason: SDUPTHER

## 2020-05-07 RX ORDER — ASPIRIN 81 MG/1
81 TABLET ORAL DAILY
Qty: 30 TAB | Refills: 0 | Status: SHIPPED | OUTPATIENT
Start: 2020-05-08 | End: 2020-08-12

## 2020-05-07 RX ORDER — ASPIRIN 81 MG/1
81 TABLET ORAL DAILY
Qty: 30 TAB | Refills: 0 | Status: SHIPPED
Start: 2020-05-08 | End: 2020-05-07

## 2020-05-07 RX ORDER — POTASSIUM CHLORIDE 20 MEQ/1
40 TABLET, EXTENDED RELEASE ORAL ONCE
Status: COMPLETED | OUTPATIENT
Start: 2020-05-07 | End: 2020-05-07

## 2020-05-07 RX ORDER — LANCETS 30 GAUGE
EACH MISCELLANEOUS
Qty: 100 EACH | Refills: 0 | Status: SHIPPED | OUTPATIENT
Start: 2020-05-07 | End: 2020-08-12

## 2020-05-07 RX ORDER — ATORVASTATIN CALCIUM 40 MG/1
40 TABLET, FILM COATED ORAL EVERY EVENING
Qty: 30 TAB | Refills: 0 | Status: SHIPPED
Start: 2020-05-07 | End: 2020-05-07

## 2020-05-07 RX ORDER — ATORVASTATIN CALCIUM 40 MG/1
40 TABLET, FILM COATED ORAL EVERY EVENING
Qty: 30 TAB | Refills: 0 | Status: ON HOLD | OUTPATIENT
Start: 2020-05-07 | End: 2020-08-20

## 2020-05-07 RX ORDER — ATORVASTATIN CALCIUM 40 MG/1
40 TABLET, FILM COATED ORAL EVERY EVENING
Status: DISCONTINUED | OUTPATIENT
Start: 2020-05-07 | End: 2020-05-07 | Stop reason: HOSPADM

## 2020-05-07 RX ORDER — POTASSIUM CHLORIDE 7.45 MG/ML
10 INJECTION INTRAVENOUS
Status: COMPLETED | OUTPATIENT
Start: 2020-05-07 | End: 2020-05-07

## 2020-05-07 RX ADMIN — POTASSIUM CHLORIDE 10 MEQ: 10 INJECTION, SOLUTION INTRAVENOUS at 11:58

## 2020-05-07 RX ADMIN — INSULIN LISPRO 7 UNITS: 100 INJECTION, SOLUTION INTRAVENOUS; SUBCUTANEOUS at 12:56

## 2020-05-07 RX ADMIN — ASPIRIN 81 MG: 81 TABLET, COATED ORAL at 04:46

## 2020-05-07 RX ADMIN — ENOXAPARIN SODIUM 40 MG: 100 INJECTION SUBCUTANEOUS at 04:46

## 2020-05-07 RX ADMIN — POTASSIUM CHLORIDE 10 MEQ: 10 INJECTION, SOLUTION INTRAVENOUS at 08:29

## 2020-05-07 RX ADMIN — INSULIN LISPRO 4 UNITS: 100 INJECTION, SOLUTION INTRAVENOUS; SUBCUTANEOUS at 08:32

## 2020-05-07 RX ADMIN — POTASSIUM CHLORIDE 10 MEQ: 10 INJECTION, SOLUTION INTRAVENOUS at 10:32

## 2020-05-07 RX ADMIN — POTASSIUM CHLORIDE 40 MEQ: 1500 TABLET, EXTENDED RELEASE ORAL at 08:28

## 2020-05-07 RX ADMIN — SENNOSIDES AND DOCUSATE SODIUM 2 TABLET: 8.6; 5 TABLET ORAL at 04:46

## 2020-05-07 RX ADMIN — SODIUM CHLORIDE, POTASSIUM CHLORIDE, SODIUM LACTATE AND CALCIUM CHLORIDE 1000 ML: 600; 310; 30; 20 INJECTION, SOLUTION INTRAVENOUS at 04:45

## 2020-05-07 ASSESSMENT — COGNITIVE AND FUNCTIONAL STATUS - GENERAL
MOBILITY SCORE: 24
SUGGESTED CMS G CODE MODIFIER MOBILITY: CH
DAILY ACTIVITIY SCORE: 24
SUGGESTED CMS G CODE MODIFIER DAILY ACTIVITY: CH

## 2020-05-07 ASSESSMENT — LIFESTYLE VARIABLES
EVER HAD A DRINK FIRST THING IN THE MORNING TO STEADY YOUR NERVES TO GET RID OF A HANGOVER: NO
DOES PATIENT WANT TO STOP DRINKING: NO
HAVE YOU EVER FELT YOU SHOULD CUT DOWN ON YOUR DRINKING: NO
HOW MANY TIMES IN THE PAST YEAR HAVE YOU HAD 5 OR MORE DRINKS IN A DAY: 0
AVERAGE NUMBER OF DAYS PER WEEK YOU HAVE A DRINK CONTAINING ALCOHOL: 0
EVER_SMOKED: NEVER
CONSUMPTION TOTAL: NEGATIVE
ALCOHOL_USE: NO
ON A TYPICAL DAY WHEN YOU DRINK ALCOHOL HOW MANY DRINKS DO YOU HAVE: 0
TOTAL SCORE: 0
EVER FELT BAD OR GUILTY ABOUT YOUR DRINKING: NO
TOTAL SCORE: 0
TOTAL SCORE: 0
HAVE PEOPLE ANNOYED YOU BY CRITICIZING YOUR DRINKING: NO

## 2020-05-07 ASSESSMENT — ENCOUNTER SYMPTOMS
MYALGIAS: 0
DOUBLE VISION: 0
NAUSEA: 0
COUGH: 0
HEADACHES: 0
CHILLS: 0
FALLS: 0
SHORTNESS OF BREATH: 0
SORE THROAT: 0
BLURRED VISION: 0
VOMITING: 0
FEVER: 0
DIZZINESS: 0

## 2020-05-07 ASSESSMENT — PATIENT HEALTH QUESTIONNAIRE - PHQ9
SUM OF ALL RESPONSES TO PHQ9 QUESTIONS 1 AND 2: 0
2. FEELING DOWN, DEPRESSED, IRRITABLE, OR HOPELESS: NOT AT ALL
1. LITTLE INTEREST OR PLEASURE IN DOING THINGS: NOT AT ALL

## 2020-05-07 ASSESSMENT — FIBROSIS 4 INDEX: FIB4 SCORE: 1.061093359533956759

## 2020-05-07 NOTE — DISCHARGE INSTRUCTIONS
Discharge Instructions    Discharged to home by car with relative. Discharged via walking, hospital escort: Yes.  Special equipment needed: Not Applicable    Be sure to schedule a follow-up appointment with your primary care doctor or any specialists as instructed.     Discharge Plan:   Diet Plan: Discussed  Activity Level: Discussed  Confirmed Follow up Appointment: Patient to Call and Schedule Appointment  Confirmed Symptoms Management: Discussed  Medication Reconciliation Updated: Yes    I understand that a diet low in cholesterol, fat, and sodium is recommended for good health. Unless I have been given specific instructions below for another diet, I accept this instruction as my diet prescription.   Other diet: diabetic    Special Instructions: None    · Is patient discharged on Warfarin / Coumadin?   No     Depression / Suicide Risk    As you are discharged from this [facility] facility, it is important to learn how to keep safe from harming yourself.    Recognize the warning signs:  · Abrupt changes in personality, positive or negative- including increase in energy   · Giving away possessions  · Change in eating patterns- significant weight changes-  positive or negative  · Change in sleeping patterns- unable to sleep or sleeping all the time   · Unwillingness or inability to communicate  · Depression  · Unusual sadness, discouragement and loneliness  · Talk of wanting to die  · Neglect of personal appearance   · Rebelliousness- reckless behavior  · Withdrawal from people/activities they love  · Confusion- inability to concentrate     If you or a loved one observes any of these behaviors or has concerns about self-harm, here's what you can do:  · Talk about it- your feelings and reasons for harming yourself  · Remove any means that you might use to hurt yourself (examples: pills, rope, extension cords, firearm)  · Get professional help from the community (Mental Health, Substance Abuse, psychological  counseling)  · Do not be alone:Call your Safe Contact- someone whom you trust who will be there for you.  · Call your local CRISIS HOTLINE 661-0774 or [phone]  · Call your local Children's Mobile Crisis Response Team [place] (488) 877-1344 or www.[employer]  · Call the toll free National Suicide Prevention Hotlines   · National Suicide Prevention Lifeline 031-680-MVQI (5443)  · [place] [facility] 800-SUICIDE (704-4198)    Cómo y dónde aplicar inyecciones de insulina por vía subcutánea - adultos  (How and Where to Give Subcutaneous Insulin Injections, Adult)  Las personas con diabetes de tipo 1 deben administrarse insulina debido a que carmelita cuerpos no la producen. Las personas con diabetes tipo 2 pueden requerir insulina. Existen muchos tipos diferentes de insulina, así doris también otros medicamentos inyectables para la diabetes, que se deben inyectar en la capa de tejido graso que se encuentra debajo de la piel. El tipo de insulina o el medicamento inyectable para la diabetes que tome puede determinar cuántas inyecciones deberá administrarse y cuándo deben aplicarse.  ELEGIR RUTHY JOHN PARA LA INYECCIÓN:  La absorción de insulina varía de un sitio a otro. Al igual que con cualquier medicamento inyectable, se recomienda inyectar la insulina dentro de la misma región del cuerpo. Sin embargo, la insulina no debe inyectarse en la misma john cada vez que se administre. Rotar las zonas para las inyecciones evitará la inflamación o la degradación de los tejidos. Hay cuatro regiones principales que pueden utilizarse para las inyecciones. Las regiones incluyen:  · Abdomen (región de preferencia, especialmente para medicamentos inyectables para la diabetes diferentes de la insulina).  · La parte anterior y superior externa de los muslos.  · La parte posterior superior del brazo  · Los glúteos  UTILIZACIÓN DE LA JERINGA Y LA AMPOLLA  Administrar insulina: única dosis   2. Lave carmelita casi con agua y  jabón.  3. Juliane rodar suavemente el frasco de insulina (ampolla) entre carmelita casi para mezclarla. No agite la ampolla.  4. Limpie el tapón de goma de la ampolla con un hisopo con alcohol. Asegúrese de eliminar la parte plástica superior de las ampollas nuevas.  5. Retire la cubierta plástica de la aguja que está en la jeringa. No deje que la aguja toque nada.  6. Tire del émbolo para extraer el aire de la jeringa. Debe tener la misma cantidad de aire que de dosis de insulina.  7. Inserte la aguja a través de la tapa de goma de la ampolla. No dé vuelta la ampolla.  8. Empuje todo el émbolo para pasar el aire a la ampolla.  9. Deje la aguja en la ampolla. Luego, dé vuelta la ampolla y la jeringa.  10. Tire lentamente del émbolo, para que ingrese la cantidad de insulina que necesita a la jeringa.  11. Observe si quedaron burbujas de aire en la jeringa. Podrá necesitar empujar el émbolo hacia arriba y hacia abajo 2 o 3 veces para eliminar las burbujas de aire de la jeringa.  12. Tire nuevamente del émbolo para conseguir la dosis correcta.  13. Quite la aguja de la ampolla.  14. Utilice jason toallita impregnada en alcohol para limpiar la myra en la que se inyectará.  15. Inyéctela, aproximadamente, 1 pulgada (2,5 cm) por debajo de la superficie de la piel, y manténgala.  16. Coloque la aguja derecha en la piel (en un ángulo de 90 grados). Inserte la aguja en toda casanova extensión (hasta la unión con la jeringa). En adultos de talla pequeña y que tienen poca grasa, deberá inyectarla en un ángulo de 45 grados.  17. Cuando la aguja esté insertada, puede soltar la piel.  18. Empuje el émbolo hasta el final para inyectar el medicamento.  19. Para extraer, tire la aguja en forma recta.  20. Presione la toallita embebida en alcohol sobre el sitio en que aplicó la inyección. Sosténgalo allí por algunos segundos. No frote la myra.  21. No vuelva a colocar la cubierta plástica en la aguja.  Administrar insulina: mezcla de 2 tipos de  "insulina   2. Lave carmelita casi con agua y jabón.  3. Juliane rodar suavemente el frasco (ampolla) de la insulina “turbia” entre carmelita casi o gírelo hacia abajo para mezclarla.  4. Limpie la parte superior de la ampolla con un hisopo con alcohol. Asegúrese de eliminar la tapa plástica superior de las ampollas nuevas.  5. Asegúrese de que entre a la jeringa la misma cantidad de aire doris de insulina “turbia” que necesita.  6. Coloque la aguja de la jeringa en el envase de insulina \"turbia\" e inyecte aire. Asegúrese de que la ampolla esté hacia arriba.  7. Quite la aguja de la ampolla de insulina \"turbia\".  8. Tire de la jeringa para que entre tanto aire doris la cantidad de insulina “cristalina” que necesita.  9. Coloque la aguja de la jeringa en el envase de insulina “cristalina” e inyecte aire.  10. Deje la aguja en la ampolla de insulina “cristalina” y luego póngala boca abajo.  11. Tire lentamente del émbolo para que la cantidad de insulina “cristalina” deseada ingrese a la jeringa.  12. Observe si quedaron burbujas de aire en la jeringa. Podrá necesitar empujar el émbolo hacia arriba y hacia abajo 2 o 3 veces para eliminar las burbujas de aire de la jeringa.  13. Quite la aguja de la ampolla de insulina “cristalina”.  14. Coloque la aguja en la ampolla de insulina \"turbia\". No inyecte la insulina \"cristalina\" en el frasco de la “turbia”.  15. Coloque la ampolla de la insulina “turbia” hacia abajo y tire del émbolo hasta que quede la misma cantidad total de insulina \"cristalina\" e insulina “turbia”.  16. Quite la aguja de la ampolla de insulina \"turbia\".  17. Utilice jason toallita impregnada en alcohol para limpiar la myra en la que se inyectará.  18. Coloque la aguja derecha en la piel (en un ángulo de 90 grados). Inserte la aguja en toda casanova extensión (hasta la unión con la jeringa). En adultos de talla pequeña y que tienen poca grasa, deberá inyectarla en un ángulo de 45 grados.  19. Cuando la aguja esté insertada, " puede soltar la piel.  20. Empuje el émbolo hasta el final para inyectar el medicamento.  21. Para extraer, tire la aguja en forma recta.  22. Presione la toallita embebida en alcohol sobre el sitio en que aplicó la inyección. Sosténgalo allí por algunos segundos. No frote la myra.  23. No vuelva a colocar la cubierta plástica en la aguja.  UTILIZACIÓN DE LAPICERAS DE INSULINA  2. Lave carmelita casi con agua y jabón.  3. Si está utilizando insulina “turbia” [place] rodar la lapicera entre carmelita casi varias veces o gírela de arriba hacia abajo.  4. Retire la tapa de la lapicera de insulina.  5. Limpie el tapón de goma del cartucho con jason toallita impregnada en alcohol.  6. Quite la etiqueta protectora de la aguja desechable.  7. Enrosque la aguja en la lapicera.  8. Retire la cubierta plástica externa protectora de la aguja.  9. Retire la cubierta plástica interna protectora de la aguja.  10. Prepare la lapicera de insulina colocando el botón (dial) a 2 unidades. Sostenga la lapicera apuntando hacia arriba y empuje el dial hasta que aparezca jason gota de insulina en la punta de la aguja. Si esto no ocurre, repita jayde paso.  11. Coloque en el dial el número de unidades de insulina que inyectará.  12. Utilice jason toallita impregnada en alcohol para limpiar la myra en la que se inyectará.  13. Inyéctela, aproximadamente, 1 pulgada (2,5 cm) por debajo de la superficie de la piel, y manténgala.  14. Coloque la aguja derecha en la piel (en un ángulo de 90 grados).  15. Empuje el botón (dial) hacia abajo para que la insulina ingrese en el tejido graso.  16. Cuente hasta 10 lentamente. Luego quite la aguja del tejido graso.  17. Coloque la cubierta plástica externa en la aguja y desenrósquela.  MANNY DESECHAR LOS ELEMENTOS  · Deseche las jeringas usadas en un recipiente especial para objetos cortopunzantes. Siga las indicaciones de las normas de la myra en que usted vive.  · Las ampollas y las lapiceras desechables pueden arrojarse  en un cesto de basura común.  Esta información no tiene doris fin reemplazar el consejo del médico. Asegúrese de hacerle al médico cualquier pregunta que tenga.  Document Released: 12/18/2006 Document Revised: 04/10/2017 Document Reviewed: 05/27/2014  [employer] Interactive Patient Education © 2017 Elsevier Inc.    Diabetes, preguntas más frecuentes  (Diabetes, Frequently Asked Questions)  ¿QUÉ ES LA DIABETES?  La mayor parte de los alimentos que consumimos se transforman en glucosa (azúcar), que es utilizada por el cuerpo para generar energía. El páncreas, un órgano que se encuentra cerca del estómago, produce jason hormona llamada insulina para facilitar el transporte de la glucosa hacia el interior de las células del organismo. Cuando se sufre de diabetes, el organismo no produce suficiente insulina o no puede utilizarla adecuadamente. [place] hace que el azúcar se acumule en la ivon.  ¿CUÁLES SON LOS SÍNTOMAS DE LA DIABETES?  · Necesidad frecuente de orinar   · Sed excesiva.   · Pérdida de peso sin causa aparente.   · Hambre excesiva.   · Visión borrosa.   · Hormigueo o adormecimiento de las casi y los pies.   · Cansancio extremo la mayor parte del tiempo.   · Piel seca y que pica.   · Úlceras que tardan mucho en curarse.   · Infección por hongos.   ¿CUÁLES SON LOS TIPOS DE DIABETES?  Diabetes tipo 1  · Aproximadamente un 10% de las personas afectadas sufren jayde tipo de diabetes.   · Suele aparecer antes de los 30 años.   · Suele ocurrir en personas con peso normal.   Diabetes tipo 2  · Aproximadamente un 90% de las personas afectadas sufren jayde tipo de diabetes.   · Suele aparecer después de los 40 años.   · Suele ocurrir en personas con sobrepeso.   · Más probabilidades si tiene:   · Antecedentes familiares de diabetes.   · Historial de diabetes rodrigo el embarazo (diabetes gestacional).   · Hipertensión arterial.   · Colesterol alto y triglicéridos.   Diabetes gestacional  · Se presenta en el 4% de los  embarazos.   · Por lo general desaparece jason vez que ha nacido el bebé.   · Suele ocurrir en mujeres con:   · Antecedentes familiares de diabetes.   · Diabetes gestacional previa.   · Obesidad.   · Mayores de 25 años.   ¿QUÉ ES LA PRE-DIABETES?  Pre-diabetes significa que casanova nivel de glucosa en ivon es mayor que lo normal, da no lo suficiente doris para diagnosticar diabetes. También significa que usted está en riesgo de padecer diabetes tipo 2 y enfermedad cardíaca. Si se le diagnostica pre-diabetes, deberá controlarse la glucosa en ivon nuevamente dentro de 1 o 2 años.  ¿CÓMO SE REALIZA EL TRATAMIENTO PARA LA DIABETES?   El tratamiento está dirigido a mantener los niveles de glucosa (azúcar) de la ivon cerca de los valores normales en todo momento. En el tratamiento de la diabetes, es muy importante el entrenamiento para el autocontrol de la enfermedad. Según el tipo de diabetes que tenga, casanova tratamiento incluirá raffaele o más de las indicaciones siguientes :  · Control de la glucosa en ivon.   · Planificación de los alimentos.   · Práctica de ejercicios.   · Medicamentos por vía oral (píldoras) o insulina.   ¿PUEDE PREVENIRSE LA DIABETES?  En la diabetes de tipo 1, la prevención es más difícil, debido a que no se conoce cuáles pueden ser los disparadores  En la diabetes tipo 2, la prevención es más fácil, si realiza cambios en el estilo de flo:  · Mantener un peso corporal adecuado.   · [place] jono.   · La práctica de ejercicios.   ¿EXISTE JASON VEE PARA LA DIABETES?  No hay vee para la diabetes. Se investiga de manera continua en búsqueda de jason vee, y se blevins realizado progresos. Joselyn trastorno puede tratarse y controlarse. Las personas con diabetes pueden controlarla y llevar jason flo normal y activa.  ¿DEBERÍA HACERME UN CONTROL PARA LA DIABETES?  Si tiene más de 45 años, debe realizarse un control de diabetes. Deberá volver a realizarlo cada 3 años. Si tiene 45 años o más y tiene sobrepeso es muy  recomendable que se realice un control con mayor frecuencia. Si tiene menos de 45 años, sobrepeso, y tiene raffaele o más factores de riesgo, deberá considerar:  · Antecedentes familiares de diabetes.   · Estilo de flo sedentario   · Hipertensión arterial.   ¿EXISTEN OTRAS PHILLIP DE INFORMACIÓN DE LA DIABETES?   Las siguientes organizaciones pueden resultarle útiles en casanova búsqueda de información sobre la diabetes:  National Diabetes Education Program (Programa Nacional de Educación sobre la Diabetes)  Internet: http://www.ndep.nih.gov/resources  American Association of Diabetes Educators (Asociación Norteamericana de Educadores para la Diabetes)  Internet: http://www.aadClass Messengert.org  Juvenile Diabetes Foundation International (Fundación Internacional para la Diabetes Juvenil)  Internet: http://www.[employer].Coupad  Document Released: 12/18/2006 Document Revised: 03/11/2013  [employer]Care® Patient Information ©2013 [employer].Hiperglucemia  (Hyperglycemia)  La hiperglucemia se produce cuando el nivel de glucosa en la ivon es demasiado alto. La glucosa es un tipo de azúcar que es la principal daniel de energía del cuerpo. Hormonas, doris la insulina y el glucagón, controlan el nivel de glucosa en la ivon. La insulina reduce el nivel de glucosa en la ivon, mientras que el glucagón lo aumenta. A veces, el cuerpo no elabora la suficiente cantidad de insulina o no puede responder normalmente a la insulina que produce. [place] puede hacer que aumente el nivel de glucosa en la ivon. La hiperglucemia le puede ocurrir tanto a las personas que tienen diabetes doris a las que no la tienen. Puede desarrollarse despacio o rápidamente y ser jason emergencia médica.  CAUSAS  Esta afección puede ser causada por lo siguiente:  · Tener prediabetes. Las personas con prediabetes tienen hiperglucemia que no es lo suficientemente donato doris para ser diagnosticada doris diabetes.  · Tener diabetes y no saberlo.  · Tener diabetes y:  ¨ No seguir el plan de  comidas.  ¨ No jr los medicamentos para la diabetes o no tomarlos correctamente.  ¨ Realizar menos actividad física que lo normal.  ¨ Ser demasiado activo físicamente.  ¨ Estar enfermo o tener jason infección.  ¨ Jr otros tipos de medicamentos que afectan negativamente el control de la diabetes o incrementan la glucemia en la ivon.  · Estar estresado.  · Jr corticoides.  FACTORES DE RIESGO  Es más probable que tenga esta afección si tiene riesgo de padecer diabetes. Entre ellos se incluyen personas que:  · Tienen antecedentes familiares de diabetes.  · Son afroamericanas, asiáticas, hispanas o latinas, o descendientes de indígenas norteamericanos.  · Tienen más de 45 años.  · Felix tenido hiperglucemia rodrigo un embarazo.  · Tienen sobrepeso u obesidad.  · Tienen hipertensión arterial.  · Tiene el colesterol elevado.  · No realizan suficiente actividad física (tiene un estilo de flo sedentario).  SÍNTOMAS  Puede tener hiperglucemia y no tener ningún síntoma. Si tiene síntomas, estos pueden incluir los siguientes:  · Aumento de la cantidad de orina.  · Aumento de la sed.  · Sequedad en la boca.  · Aumento o pérdida del apetito.  · Cambios en la visión, doris visión borrosa.  · Cansancio (fatiga).  · Aliento con olor a fruta.  · Debilidad.  · Aumento o pérdida de peso involuntarios. La pérdida de peso puede ser muy rápida.  · Hormigueo o adormecimiento en las casi o los pies.  · Dolor de homero.  · Cuando pellizca la piel, esta no vuelve rápidamente a casanova lugar al soltarla.  · Dolor en el abdomen.  · [place]amejo o moretones que tardan en sanarse.  DIAGNÓSTICO  Casanova médico puede sospechar que tiene hiperglucemia por casanova historia clínica y un examen físico. El diagnóstico se realiza con un análisis de ivon que mide el nivel de glucosa en la ivon. También pueden hacerle otros análisis de ivon para ayudar a encontrar la causa de la hiperglucemia. Estos incluyen los siguientes:  · Un análisis de ivon de A1C  (hemoglobina A1c). Joselyn estudio muestra cuál fue el nivel promedio de glucosa en la ivon en un período de 3 meses.  · Un análisis de glucosa en la ivon después de ocho horas de ayuno.  · Prueba de tolerancia a la glucosa. Joselyn estudio se realiza después de horacio jason bebida azucarada.  TRATAMIENTO  El tratamiento depende de la causa de esta afección. El tratamiento puede incluir lo siguiente:  · Añadir, cambiar o ajustar los medicamentos para la diabetes. Es muy importante horacio cualquier medicamento para el tratamiento de la diabetes doris se lo haya indicado el médico.     · Cambiar o ajustar el plan de comidas.  · Tratar jason enfermedad o afección.  · Controlar con más frecuencia el nivel de glucosa en la ivon.  · Cambiar el plan de ejercicios.  · Disminuir o interrumpir la ingesta de corticoides.  · Cambios en el estilo de flo. Estos pueden incluir los siguientes:  ¨ Realizar actividad física.  ¨ Bajar de peso.  ¨ Mantener jason dieta saludable.  · Entender cuáles deben ser carmelita niveles de glucosa y qué hacer si aumentan mucho.  INSTRUCCIONES PARA EL CUIDADO EN EL HOGAR  · Si tiene diabetes, siga casanova plan de control de la diabetes. Juliane lo siguiente:  ¨ [place] los medicamentos según las indicaciones.  ¨ Siga el plan de ejercicio.  ¨ Siga el plan de comidas.  ¨ Controle casanova nivel de glucosa en la ivon periódicamente. Controle casanova nivel de glucosa en la ivon antes y después de ejercitarse. Si hace ejercicio rodrigo más tiempo o de manera diferente de lo habitual, asegúrese de controlar casanova nivel de glucosa en la ivon con mayor frecuencia.  ¨ Use casanova pulsera de alerta médica, que indica que usted tiene diabetes.  · Anjali suficiente líquido para mantener la orina gilbert o de color amarillo pálido.  · Mantenga un peso saludable con dieta y ejercicios. Pregúntele a casanova médico cuál es casanova peso ideal.  · No consuma ningún producto que contenga tabaco, lo que incluye cigarrillos, tabaco de mascar y cigarrillos electrónicos.  Si necesita ayuda para dejar de fumar, consulte al médico.  · Limite el consumo de alcohol a no más de 1 medida por día si es kamila y no está embarazada, y 2 medidas si es hombre. Marta medida equivale a 12 onzas de cerveza, 5 onzas de vino o 1½ onza de bebidas alcohólicas de donato graduación.  · Concurra a todas las visitas de control doris se lo haya indicado el médico. [place] es importante.  SOLICITE ATENCIÓN MÉDICA SI:  · Casanova nivel de glucosa en la ivon está por encima del rango indicado en dos mediciones seguidas.  · Tiene episodios frecuentes de hiperglucemia.  SOLICITE ATENCIÓN MÉDICA DE INMEDIATO SI:  · Tiene dificultad para respirar.  · Tiene cambios en la manera se sentirse, pensar o actuar (estado mental).  · Tiene náuseas o vómitos que no desaparecen.  Estos síntomas pueden representar un problema que constituye marta emergencia. No espere hasta que los síntomas desaparezcan. Solicite atención médica de inmediato. Comuníquese con el servicio de emergencias de casanova localidad (911 en los [place]). No conduzca por carmelita propios medios hasta el hospital.   Esta información no tiene doris fin reemplazar el consejo del médico. Asegúrese de hacerle al médico cualquier pregunta que tenga.  Document Released: 12/18/2006 Document Revised: 04/10/2017  Elsevier Interactive Patient Education © 2017 Elsevier Inc.

## 2020-05-07 NOTE — DISCHARGE PLANNING
Anticipated Discharge Disposition: Home    Action: LSW was requested to fax scripts over to pt's pharmacy. LSW was provided fax number- [phone].   LSW received fax confirmation.    Barriers to Discharge: None    Plan: LSW to assist as needed

## 2020-05-07 NOTE — DIETARY
Nutrition Services:  Day 0 of admit.  Chino Deluca is a 65 y.o. male with admitting DX of Hyperglycemia without ketosis    Received TF consult. Pt on DM diet, spoke with RN who said TF consult was an error. Discontinued tube feeding consult.    RD following per department protocol

## 2020-05-07 NOTE — CARE PLAN
Problem: Safety  Goal: Will remain free from injury  Outcome: PROGRESSING AS EXPECTED  Note: Patient educated about risk of falls and reasoning for use of tread socks, calling for help, and bed alarms.        Problem: Knowledge Deficit  Goal: Knowledge of disease process/condition, treatment plan, diagnostic tests, and medications will improve  Outcome: PROGRESSING AS EXPECTED  Note: Patient educated regarding medications, procedures and plan of care.

## 2020-05-07 NOTE — PROGRESS NOTES
Daily Progress Note:     Date of Service: 5/7/2020  Primary Team: [facility] IM Yellow Team   Attending: Cornell Vance M.D.   Senior Resident: Dr. Lovelace  Intern: Dr. Martinez  Contact:  [phone]    Chief Complaint:   Hyperglycemia    Subjective  - No acute events overnight  - Patient is feeling better  - BS have been in low 200s today.   - Can be discharged after seen by diabetic educator.     Consultants/Specialty:  Diabetic Educator    Review of Systems:    Review of Systems   Constitutional: Negative for chills and fever.   HENT: Negative for congestion and sore throat.    Eyes: Negative for blurred vision and double vision.   Respiratory: Negative for cough and shortness of breath.    Cardiovascular: Negative for chest pain and leg swelling.   Gastrointestinal: Negative for nausea and vomiting.   Genitourinary: Negative for dysuria and urgency.   Musculoskeletal: Negative for falls and myalgias.   Skin: Negative for rash.   Neurological: Negative for dizziness and headaches.       Objective Data:   Physical Exam:   Vitals:   Temp:  [36.1 °C (96.9 °F)-37.4 °C (99.3 °F)] 36.1 °C (96.9 °F)  Pulse:  [67-82] 74  Resp:  [13-20] 17  BP: (115-142)/(63-82) 137/76  SpO2:  [90 %-94 %] 93 %     Physical Exam  Constitutional:       General: He is not in acute distress.  HENT:      Head: Normocephalic and atraumatic.   Eyes:      General: No scleral icterus.     Extraocular Movements: Extraocular movements intact.      Pupils: Pupils are equal, round, and reactive to light.   Cardiovascular:      Rate and Rhythm: Normal rate and regular rhythm.      Heart sounds: No murmur.   Pulmonary:      Effort: Pulmonary effort is normal. No respiratory distress.      Breath sounds: Normal breath sounds.   Abdominal:      General: There is no distension.      Palpations: Abdomen is soft.   Skin:     General: Skin is warm and dry.   Neurological:      General: No focal deficit present.      Mental Status: He is alert and oriented to  person, place, and time.       Labs:   Results for orders placed or performed during the hospital encounter of 05/06/20   CBC WITH DIFFERENTIAL   Result Value Ref Range    WBC 7.4 4.8 - 10.8 K/uL    RBC 5.48 4.70 - 6.10 M/uL    Hemoglobin 16.5 14.0 - 18.0 g/dL    Hematocrit 45.8 42.0 - 52.0 %    MCV 83.6 81.4 - 97.8 fL    MCH 30.1 27.0 - 33.0 pg    MCHC 36.0 (H) 33.7 - 35.3 g/dL    RDW 38.8 35.9 - 50.0 fL    Platelet Count 212 164 - 446 K/uL    MPV 11.1 9.0 - 12.9 fL    Neutrophils-Polys 56.70 44.00 - 72.00 %    Lymphocytes 26.80 22.00 - 41.00 %    Monocytes 9.30 0.00 - 13.40 %    Eosinophils 6.20 0.00 - 6.90 %    Basophils 0.70 0.00 - 1.80 %    Immature Granulocytes 0.30 0.00 - 0.90 %    Nucleated RBC 0.00 /100 WBC    Neutrophils (Absolute) 4.19 1.82 - 7.42 K/uL    Lymphs (Absolute) 1.98 1.00 - 4.80 K/uL    Monos (Absolute) 0.69 0.00 - 0.85 K/uL    Eos (Absolute) 0.46 0.00 - 0.51 K/uL    Baso (Absolute) 0.05 0.00 - 0.12 K/uL    Immature Granulocytes (abs) 0.02 0.00 - 0.11 K/uL    NRBC (Absolute) 0.00 K/uL   COMP METABOLIC PANEL   Result Value Ref Range    Sodium 126 (L) 135 - 145 mmol/L    Potassium 4.2 3.6 - 5.5 mmol/L    Chloride 90 (L) 96 - 112 mmol/L    Co2 23 20 - 33 mmol/L    Anion Gap 13.0 7.0 - 16.0    Glucose 672 (HH) 65 - 99 mg/dL    Bun 16 8 - 22 mg/dL    Creatinine 0.97 0.50 - 1.40 mg/dL    Calcium 8.7 8.5 - 10.5 mg/dL    AST(SGOT) 16 12 - 45 U/L    ALT(SGPT) 19 2 - 50 U/L    Alkaline Phosphatase 162 (H) 30 - 99 U/L    Total Bilirubin 0.6 0.1 - 1.5 mg/dL    Albumin 4.0 3.2 - 4.9 g/dL    Total Protein 6.9 6.0 - 8.2 g/dL    Globulin 2.9 1.9 - 3.5 g/dL    A-G Ratio 1.4 g/dL   URINALYSIS (UA)   Result Value Ref Range    Color Yellow     Character Clear     Specific Gravity 1.034 <1.035    Ph 5.0 5.0 - 8.0    Glucose >=1000 (A) Negative mg/dL    Ketones Negative Negative mg/dL    Protein Negative Negative mg/dL    Bilirubin Negative Negative    Urobilinogen, Urine 0.2 Negative    Nitrite Negative Negative     Leukocyte Esterase Negative Negative    Occult Blood Negative Negative    Micro Urine Req see below    ESTIMATED GFR   Result Value Ref Range    GFR If African American >60 >60 mL/min/1.73 m 2    GFR If Non African American >60 >60 mL/min/1.73 m 2   Magnesium   Result Value Ref Range    Magnesium 1.8 1.5 - 2.5 mg/dL   OSMOLALITY SERUM   Result Value Ref Range    Osmolality Serum 289 278 - 298 mOsm/kg H2O   Comp Metabolic Panel (CMP)   Result Value Ref Range    Sodium 136 135 - 145 mmol/L    Potassium 3.3 (L) 3.6 - 5.5 mmol/L    Chloride 101 96 - 112 mmol/L    Co2 23 20 - 33 mmol/L    Anion Gap 12.0 7.0 - 16.0    Glucose 185 (H) 65 - 99 mg/dL    Bun 14 8 - 22 mg/dL    Creatinine 0.77 0.50 - 1.40 mg/dL    Calcium 8.9 8.5 - 10.5 mg/dL    AST(SGOT) 14 12 - 45 U/L    ALT(SGPT) 17 2 - 50 U/L    Alkaline Phosphatase 92 30 - 99 U/L    Total Bilirubin 0.4 0.1 - 1.5 mg/dL    Albumin 3.6 3.2 - 4.9 g/dL    Total Protein 6.1 6.0 - 8.2 g/dL    Globulin 2.5 1.9 - 3.5 g/dL    A-G Ratio 1.4 g/dL   CBC with Differential   Result Value Ref Range    WBC 7.7 4.8 - 10.8 K/uL    RBC 5.02 4.70 - 6.10 M/uL    Hemoglobin 14.8 14.0 - 18.0 g/dL    Hematocrit 42.1 42.0 - 52.0 %    MCV 83.9 81.4 - 97.8 fL    MCH 29.5 27.0 - 33.0 pg    MCHC 35.2 33.7 - 35.3 g/dL    RDW 40.0 35.9 - 50.0 fL    Platelet Count 208 164 - 446 K/uL    MPV 10.6 9.0 - 12.9 fL    Neutrophils-Polys 43.30 (L) 44.00 - 72.00 %    Lymphocytes 40.00 22.00 - 41.00 %    Monocytes 10.10 0.00 - 13.40 %    Eosinophils 5.70 0.00 - 6.90 %    Basophils 0.80 0.00 - 1.80 %    Immature Granulocytes 0.10 0.00 - 0.90 %    Nucleated RBC 0.00 /100 WBC    Neutrophils (Absolute) 3.35 1.82 - 7.42 K/uL    Lymphs (Absolute) 3.09 1.00 - 4.80 K/uL    Monos (Absolute) 0.78 0.00 - 0.85 K/uL    Eos (Absolute) 0.44 0.00 - 0.51 K/uL    Baso (Absolute) 0.06 0.00 - 0.12 K/uL    Immature Granulocytes (abs) 0.01 0.00 - 0.11 K/uL    NRBC (Absolute) 0.00 K/uL   HEMOGLOBIN A1C   Result Value Ref Range     Glycohemoglobin 17.2 (H) 0.0 - 5.6 %    Est Avg Glucose 447 mg/dL   Lipid Profile   Result Value Ref Range    Cholesterol,Tot 183 100 - 199 mg/dL    Triglycerides 587 (H) 0 - 149 mg/dL    HDL 31 (A) >=40 mg/dL    LDL see below <100 mg/dL   ESTIMATED GFR   Result Value Ref Range    GFR If African American >60 >60 mL/min/1.73 m 2    GFR If Non African American >60 >60 mL/min/1.73 m 2   MAGNESIUM   Result Value Ref Range    Magnesium 1.6 1.5 - 2.5 mg/dL   ACCU-CHEK GLUCOSE   Result Value Ref Range    Glucose - Accu-Ck >600 (HH) 65 - 99 mg/dL   ACCU-CHEK GLUCOSE   Result Value Ref Range    Glucose - Accu-Ck 520 (HH) 65 - 99 mg/dL   ACCU-CHEK GLUCOSE   Result Value Ref Range    Glucose - Accu-Ck 453 (HH) 65 - 99 mg/dL   ACCU-CHEK GLUCOSE   Result Value Ref Range    Glucose - Accu-Ck 333 (H) 65 - 99 mg/dL   ACCU-CHEK GLUCOSE   Result Value Ref Range    Glucose - Accu-Ck 202 (H) 65 - 99 mg/dL   ACCU-CHEK GLUCOSE   Result Value Ref Range    Glucose - Accu-Ck 295 (H) 65 - 99 mg/dL       Imaging:   No orders to display         * Hyperglycemia without ketosis  Assessment & Plan  - Patient reports several days of polyuria and polydipsia.  Patient has been without insulin for roughly 2 months.   - On presentation glucose 672, chloride 90, sodium of 126 (corrected 135), potassium 4.2, anion gap of 13, UA noted glucosuria  - 10 units of insulin IV given in ED    Plan:  - Blood sugars are in better control  -- Can be discharged today  - Diabetic educator will see the patient today    HLD (hyperlipidemia)- (present on admission)  Assessment & Plan  -Patient not currently taking any cholesterol-lowering medications.  - Started Atorvastatin 40mg daily.     HTN (hypertension)- (present on admission)  Assessment & Plan  -Previously listed diagnoses.  Patient is not currently taking any antihypertensives.

## 2020-05-07 NOTE — PROGRESS NOTES
Report received from RN.  Bed in low position.  Call light within reach. All needs met at this time.

## 2020-05-07 NOTE — DISCHARGE SUMMARY
Discharge Summary    CHIEF COMPLAINT ON ADMISSION  Chief Complaint   Patient presents with   • High Blood Sugar     HIGH on reading, hasn;t taken insulin for 2 months due to insurance issues.  takes metformin       Reason for Admission  Blood sugar problems     Admission Date  5/6/2020    CODE STATUS  Full Code    HPI & HOSPITAL COURSE  This is a 65 y.o. male here with a history of type 2 DM. He presented to the ED with hyperglycemia without ketosis.     Hyperglycemia without ketosis: The patient ran out of his insulin and didn't pick it up. His A1c on admission was 17.2. When I asked the patient further questions on the day of discharge, it seems that because of COVID, he lost his job and therefore his insurance and can't afford his insulin. He was started on 16U nightly and 5U pre-meal insulin inpatient. The patient's BS came down from the 600s to the low 200s by discharge. I spoke to pharmacy and the diabetic educator on the best option for the patient given that he will need to be discharged on Novolin/Humilin 70/30. Pharmacy recommended that given how much insulin he was using during the day, 15U BID would be reasonable. The patient will be discharged with a 30-day supply of Novoloin/Humilin 15U BID. I called the  and the patient will need to make an appointment or walk-in with [facility] for further refills on this medication. He was given a true metrix meter and diabetic supplies as well.    Hyperlipidemia: The patient has triglycerides >500. For this reason, LDL was not able to be calculated. Given that he is diabetic, he was started on Atorvastatin 40mg daily as well.          Therefore, he is discharged in good and stable condition to home with close outpatient follow-up.    The patient recovered much more quickly than anticipated on admission.    Discharge Date  05/07/20    FOLLOW UP ITEMS POST DISCHARGE  1) Please follow-up with [facility] for more refills of  medications    DISCHARGE DIAGNOSES  Principal Problem:    Hyperglycemia without ketosis POA: Unknown  Active Problems:    HTN (hypertension) POA: Yes    HLD (hyperlipidemia) POA: Yes  Resolved Problems:    * No resolved hospital problems. *      FOLLOW UP  No future appointments.  [address]  [place] [id number] [phone]  Schedule an appointment as soon as possible for a visit  Please call to establish with a Primary Care Physicain and schedule your hospital follow up. Thank you.      MEDICATIONS ON DISCHARGE     Medication List      START taking these medications      Instructions   Alcohol Swabs   Doctor's comments:  Per formulary preference. ICD-10 code: E11.65 Uncontrolled type 2 Diabetes Mellitus  Wipe site with prep pad prior to injection.     aspirin 81 MG EC tablet  Start taking on:  May 8, 2020   Take 1 Tab by mouth every day.  Dose:  81 mg     atorvastatin 40 MG Tabs  Commonly known as:  LIPITOR   Take 1 Tab by mouth every evening.  Dose:  40 mg     * Blood Glucose Meter Kit   Doctor's comments:  Or per formulary preference. ICD-10 code: E11.65 Uncontrolled type 2 Diabetes Mellitus  Test blood sugar as recommended by provider. True Metrix blood glucose monitoring kit.     * Blood Glucose Test Strips   Doctor's comments:  Or per formulary preference. ICD-10 code: E11.65 Uncontrolled type 2 Diabetes Mellitus  Use one True Metrix strip to test blood sugar twice daily.     insulin 70/30 (70-30) 100 UNIT/ML Susp  Commonly known as:  HUMULIN/NOVOLIN   Inject 15 Units as instructed 2 Times a Day.  Dose:  15 Units     Insulin Syringes U-100 0.3 mL   Doctor's comments:  Per formulary preference. ICD-10 code: E11.65 Uncontrolled type 2 Diabetes Mellitus  Use one syringe to inject insulin twice daily.     Lancets   Doctor's comments:  Or per formulary preference. ICD-10 code: E11.65 Uncontrolled type 2 Diabetes Mellitus  Use one True Metrix lancet to test blood sugar  twice daily.         * This list has 2 medication(s) that are the same as other medications prescribed for you. Read the directions carefully, and ask your doctor or other care provider to review them with you.                Allergies  No Known Allergies    DIET  Orders Placed This Encounter   Procedures   • Diet Order Diabetic     Standing Status:   Standing     Number of Occurrences:   1     Order Specific Question:   Diet:     Answer:   Diabetic [3]       ACTIVITY  As tolerated.  Weight bearing as tolerated    CONSULTATIONS  None    PROCEDURES  None    LABORATORY  Lab Results   Component Value Date    SODIUM 136 05/07/2020    POTASSIUM 3.3 (L) 05/07/2020    CHLORIDE 101 05/07/2020    CO2 23 05/07/2020    GLUCOSE 185 (H) 05/07/2020    BUN 14 05/07/2020    CREATININE 0.77 05/07/2020        Lab Results   Component Value Date    WBC 7.7 05/07/2020    HEMOGLOBIN 14.8 05/07/2020    HEMATOCRIT 42.1 05/07/2020    PLATELETCT 208 05/07/2020        Total time of the discharge process exceeds 43 minutes.

## 2020-05-07 NOTE — PROGRESS NOTES
Patient discharged home by the Physician.  Discharge instructions reviewed with patient including when to follow up with PCP, new medications, and signs/symptoms to watch for.  Patient verbalized understanding.   services utilized via iPAD with [employer]. PIV removed.  Tele box removed; monitor room notified. Patient transferred to [facility] via wheelchair for discharge.

## 2020-05-07 NOTE — CARE PLAN
Problem: Communication  Goal: The ability to communicate needs accurately and effectively will improve  Outcome: PROGRESSING AS EXPECTED   Language line utilized as needed.  Problem: Safety  Goal: Will remain free from injury  Outcome: PROGRESSING AS EXPECTED

## 2020-05-07 NOTE — PROGRESS NOTES
Pt up to floor on zoll. Placed on tele box upon arrival. Confirmed SR with monitor tech. Discussed POC with pt and ED RN at bedside. Discussed safety with pt. Bed is locked in lowest position and call light/personal belongings are within reach.

## 2020-05-08 NOTE — CONSULTS
Diabetes education: Met with pt this afternoon before discharge. Pt has a hx of diabetes who had been on insulin about one year ago, was changed to oral agents and ran out of medication for two months because of insurance issues. Pt is now covered by [facility] and is covered to attend their wellness clinic [facility] ( [phone]). It was the clinic at the [facility], the pt attended and was sent to [facility]. Pt has an appointment with them next week. [facility] called and spoke with both MA and Nurse. He can get all his oral agents, and testing supplies through them without cost. They do not have insulin or syringes.  Originally pt was listed without insurance and would need assistance for medications. He then had [facility] listed and will  his  70/30 insulin and syringes at [facility] on [address] [place] [address]. [facility] has spoken and tiger text resident with this information.  Briefly reviewed diabetes, hyper and hypoglycemia, Metformin, insulin ( 70/30), how to draw up and give ( he has given previously) and handouts given. He has done finger sticks before and will get supplies at the [facility] clinic but needed to be discharged in time to get there  Before 7 pm.   All education and handouts given in Guinean.

## 2020-06-05 ENCOUNTER — OFFICE VISIT (OUTPATIENT)
Dept: ADMISSIONS | Facility: MEDICAL CENTER | Age: 65
End: 2020-06-05
Attending: OPHTHALMOLOGY
Payer: COMMERCIAL

## 2020-06-05 DIAGNOSIS — Z01.812 PRE-OPERATIVE LABORATORY EXAMINATION: ICD-10-CM

## 2020-06-05 DIAGNOSIS — Z01.810 PRE-OPERATIVE CARDIOVASCULAR EXAMINATION: ICD-10-CM

## 2020-06-05 DIAGNOSIS — Z01.812 PRE-PROCEDURAL LABORATORY EXAMINATION: ICD-10-CM

## 2020-06-05 LAB
ANION GAP SERPL CALC-SCNC: 11 MMOL/L (ref 7–16)
BUN SERPL-MCNC: 17 MG/DL (ref 8–22)
CALCIUM SERPL-MCNC: 8.9 MG/DL (ref 8.5–10.5)
CHLORIDE SERPL-SCNC: 100 MMOL/L (ref 96–112)
CO2 SERPL-SCNC: 26 MMOL/L (ref 20–33)
COVID ORDER STATUS COVID19: NORMAL
CREAT SERPL-MCNC: 0.75 MG/DL (ref 0.5–1.4)
EKG IMPRESSION: NORMAL
GLUCOSE SERPL-MCNC: 300 MG/DL (ref 65–99)
POTASSIUM SERPL-SCNC: 4.2 MMOL/L (ref 3.6–5.5)
SODIUM SERPL-SCNC: 137 MMOL/L (ref 135–145)

## 2020-06-05 PROCEDURE — 93010 ELECTROCARDIOGRAM REPORT: CPT | Performed by: INTERNAL MEDICINE

## 2020-06-05 PROCEDURE — 80048 BASIC METABOLIC PNL TOTAL CA: CPT

## 2020-06-05 PROCEDURE — 93005 ELECTROCARDIOGRAM TRACING: CPT

## 2020-06-05 PROCEDURE — 36415 COLL VENOUS BLD VENIPUNCTURE: CPT

## 2020-06-05 PROCEDURE — C9803 HOPD COVID-19 SPEC COLLECT: HCPCS

## 2020-06-05 RX ORDER — LOSARTAN POTASSIUM 25 MG/1
25 TABLET ORAL EVERY EVENING
Status: ON HOLD | COMMUNITY
End: 2020-08-20

## 2020-06-05 ASSESSMENT — FIBROSIS 4 INDEX: FIB4 SCORE: 1.061093359533956759

## 2020-06-07 LAB
SARS-COV-2 RNA RESP QL NAA+PROBE: NOT DETECTED
SPECIMEN SOURCE: NORMAL

## 2020-06-08 NOTE — OR NURSING
COVID-19 Pre-surgery screenin. Do you have an undiagnosed respiratory illness or symptoms such as coughing or sneezing? NO (Yes/No)  a. Onset of Sx NO  b. Acute vs. chronic respiratory illness NO    2. Do you have an unexplained fever greater than 100.4 degrees Fahrenheit or 38 degrees Celsius?     NO (Yes/No)    3. Have you had direct exposure to a patient who tested positive for Covid-19?    NO (Yes/No)    4. Have you traveled outside [facility] in the last 14 days? NO    5. Have you had any lost of taste, smell, N/V or sore throat? NO    Patient has been informed of no visitor policy and asked to wear a mask upon entering the hospital   YES (Yes/No)

## 2020-06-09 ENCOUNTER — HOSPITAL ENCOUNTER (OUTPATIENT)
Facility: MEDICAL CENTER | Age: 65
End: 2020-06-09
Attending: OPHTHALMOLOGY | Admitting: OPHTHALMOLOGY
Payer: COMMERCIAL

## 2020-06-09 ENCOUNTER — ANESTHESIA EVENT (OUTPATIENT)
Dept: SURGERY | Facility: MEDICAL CENTER | Age: 65
End: 2020-06-09
Payer: COMMERCIAL

## 2020-06-09 ENCOUNTER — ANESTHESIA (OUTPATIENT)
Dept: SURGERY | Facility: MEDICAL CENTER | Age: 65
End: 2020-06-09
Payer: COMMERCIAL

## 2020-06-09 VITALS
HEIGHT: 63 IN | HEART RATE: 77 BPM | TEMPERATURE: 97.3 F | WEIGHT: 190.92 LBS | SYSTOLIC BLOOD PRESSURE: 136 MMHG | BODY MASS INDEX: 33.83 KG/M2 | RESPIRATION RATE: 16 BRPM | DIASTOLIC BLOOD PRESSURE: 79 MMHG | OXYGEN SATURATION: 95 %

## 2020-06-09 LAB
GLUCOSE BLD-MCNC: 100 MG/DL (ref 65–99)
GLUCOSE BLD-MCNC: 89 MG/DL (ref 65–99)

## 2020-06-09 PROCEDURE — A9270 NON-COVERED ITEM OR SERVICE: HCPCS

## 2020-06-09 PROCEDURE — 82962 GLUCOSE BLOOD TEST: CPT

## 2020-06-09 PROCEDURE — 500558 HCHG EYE SHIELD W/GARTER (FOX): Performed by: OPHTHALMOLOGY

## 2020-06-09 PROCEDURE — 700102 HCHG RX REV CODE 250 W/ 637 OVERRIDE(OP)

## 2020-06-09 PROCEDURE — 501836 HCHG SUTURE EYE: Performed by: OPHTHALMOLOGY

## 2020-06-09 PROCEDURE — 700101 HCHG RX REV CODE 250: Performed by: ANESTHESIOLOGY

## 2020-06-09 PROCEDURE — 700102 HCHG RX REV CODE 250 W/ 637 OVERRIDE(OP): Performed by: OPHTHALMOLOGY

## 2020-06-09 PROCEDURE — 160009 HCHG ANES TIME/MIN: Performed by: OPHTHALMOLOGY

## 2020-06-09 PROCEDURE — 160035 HCHG PACU - 1ST 60 MINS PHASE I: Performed by: OPHTHALMOLOGY

## 2020-06-09 PROCEDURE — 160029 HCHG SURGERY MINUTES - 1ST 30 MINS LEVEL 4: Performed by: OPHTHALMOLOGY

## 2020-06-09 PROCEDURE — 160048 HCHG OR STATISTICAL LEVEL 1-5: Performed by: OPHTHALMOLOGY

## 2020-06-09 PROCEDURE — 700101 HCHG RX REV CODE 250

## 2020-06-09 PROCEDURE — 160036 HCHG PACU - EA ADDL 30 MINS PHASE I: Performed by: OPHTHALMOLOGY

## 2020-06-09 PROCEDURE — 160025 RECOVERY II MINUTES (STATS): Performed by: OPHTHALMOLOGY

## 2020-06-09 PROCEDURE — 160041 HCHG SURGERY MINUTES - EA ADDL 1 MIN LEVEL 4: Performed by: OPHTHALMOLOGY

## 2020-06-09 PROCEDURE — A6410 STERILE EYE PAD: HCPCS | Performed by: OPHTHALMOLOGY

## 2020-06-09 PROCEDURE — 700111 HCHG RX REV CODE 636 W/ 250 OVERRIDE (IP): Performed by: ANESTHESIOLOGY

## 2020-06-09 PROCEDURE — 700111 HCHG RX REV CODE 636 W/ 250 OVERRIDE (IP): Performed by: OPHTHALMOLOGY

## 2020-06-09 PROCEDURE — 700101 HCHG RX REV CODE 250: Performed by: OPHTHALMOLOGY

## 2020-06-09 PROCEDURE — A9270 NON-COVERED ITEM OR SERVICE: HCPCS | Performed by: OPHTHALMOLOGY

## 2020-06-09 PROCEDURE — 700105 HCHG RX REV CODE 258: Performed by: OPHTHALMOLOGY

## 2020-06-09 PROCEDURE — 160002 HCHG RECOVERY MINUTES (STAT): Performed by: OPHTHALMOLOGY

## 2020-06-09 PROCEDURE — 501838 HCHG SUTURE GENERAL: Performed by: OPHTHALMOLOGY

## 2020-06-09 PROCEDURE — 160046 HCHG PACU - 1ST 60 MINS PHASE II: Performed by: OPHTHALMOLOGY

## 2020-06-09 PROCEDURE — 503198 HCHG BACKFLUSH, SOFT TIP: Performed by: OPHTHALMOLOGY

## 2020-06-09 RX ORDER — LIDOCAINE HYDROCHLORIDE 20 MG/ML
INJECTION, SOLUTION EPIDURAL; INFILTRATION; INTRACAUDAL; PERINEURAL PRN
Status: DISCONTINUED | OUTPATIENT
Start: 2020-06-09 | End: 2020-06-09 | Stop reason: SURG

## 2020-06-09 RX ORDER — ONDANSETRON 2 MG/ML
4 INJECTION INTRAMUSCULAR; INTRAVENOUS
Status: DISCONTINUED | OUTPATIENT
Start: 2020-06-09 | End: 2020-06-09 | Stop reason: HOSPADM

## 2020-06-09 RX ORDER — CYCLOPENTOLATE HYDROCHLORIDE 10 MG/ML
SOLUTION/ DROPS OPHTHALMIC
Status: COMPLETED
Start: 2020-06-09 | End: 2020-06-09

## 2020-06-09 RX ORDER — LORAZEPAM 2 MG/ML
0.5 INJECTION INTRAMUSCULAR
Status: DISCONTINUED | OUTPATIENT
Start: 2020-06-09 | End: 2020-06-09 | Stop reason: HOSPADM

## 2020-06-09 RX ORDER — DEXAMETHASONE SODIUM PHOSPHATE 4 MG/ML
INJECTION, SOLUTION INTRA-ARTICULAR; INTRALESIONAL; INTRAMUSCULAR; INTRAVENOUS; SOFT TISSUE PRN
Status: DISCONTINUED | OUTPATIENT
Start: 2020-06-09 | End: 2020-06-09 | Stop reason: SURG

## 2020-06-09 RX ORDER — OXYCODONE HCL 5 MG/5 ML
10 SOLUTION, ORAL ORAL
Status: DISCONTINUED | OUTPATIENT
Start: 2020-06-09 | End: 2020-06-09 | Stop reason: HOSPADM

## 2020-06-09 RX ORDER — NEOMYCIN SULFATE, POLYMYXIN B SULFATE, AND DEXAMETHASONE 3.5; 10000; 1 MG/G; [USP'U]/G; MG/G
OINTMENT OPHTHALMIC
Status: DISCONTINUED | OUTPATIENT
Start: 2020-06-09 | End: 2020-06-09 | Stop reason: HOSPADM

## 2020-06-09 RX ORDER — HYDROMORPHONE HYDROCHLORIDE 1 MG/ML
0.4 INJECTION, SOLUTION INTRAMUSCULAR; INTRAVENOUS; SUBCUTANEOUS
Status: DISCONTINUED | OUTPATIENT
Start: 2020-06-09 | End: 2020-06-09 | Stop reason: HOSPADM

## 2020-06-09 RX ORDER — OXYCODONE HCL 5 MG/5 ML
5 SOLUTION, ORAL ORAL
Status: DISCONTINUED | OUTPATIENT
Start: 2020-06-09 | End: 2020-06-09 | Stop reason: HOSPADM

## 2020-06-09 RX ORDER — BALANCED SALT SOLUTION 6.4; .75; .48; .3; 3.9; 1.7 MG/ML; MG/ML; MG/ML; MG/ML; MG/ML; MG/ML
SOLUTION OPHTHALMIC
Status: DISCONTINUED | OUTPATIENT
Start: 2020-06-09 | End: 2020-06-09 | Stop reason: HOSPADM

## 2020-06-09 RX ORDER — BALANCED SALT SOLUTION ENRICHED WITH BICARBONATE, DEXTROSE, AND GLUTATHIONE
KIT INTRAOCULAR
Status: DISCONTINUED | OUTPATIENT
Start: 2020-06-09 | End: 2020-06-09 | Stop reason: HOSPADM

## 2020-06-09 RX ORDER — DEXAMETHASONE SODIUM PHOSPHATE 4 MG/ML
INJECTION, SOLUTION INTRA-ARTICULAR; INTRALESIONAL; INTRAMUSCULAR; INTRAVENOUS; SOFT TISSUE
Status: DISCONTINUED | OUTPATIENT
Start: 2020-06-09 | End: 2020-06-09 | Stop reason: HOSPADM

## 2020-06-09 RX ORDER — TROPICAMIDE 10 MG/ML
SOLUTION/ DROPS OPHTHALMIC
Status: COMPLETED
Start: 2020-06-09 | End: 2020-06-09

## 2020-06-09 RX ORDER — HYDROMORPHONE HYDROCHLORIDE 1 MG/ML
0.2 INJECTION, SOLUTION INTRAMUSCULAR; INTRAVENOUS; SUBCUTANEOUS
Status: DISCONTINUED | OUTPATIENT
Start: 2020-06-09 | End: 2020-06-09 | Stop reason: HOSPADM

## 2020-06-09 RX ORDER — ONDANSETRON 2 MG/ML
INJECTION INTRAMUSCULAR; INTRAVENOUS PRN
Status: DISCONTINUED | OUTPATIENT
Start: 2020-06-09 | End: 2020-06-09 | Stop reason: SURG

## 2020-06-09 RX ORDER — HYDRALAZINE HYDROCHLORIDE 20 MG/ML
5 INJECTION INTRAMUSCULAR; INTRAVENOUS
Status: DISCONTINUED | OUTPATIENT
Start: 2020-06-09 | End: 2020-06-09 | Stop reason: HOSPADM

## 2020-06-09 RX ORDER — HALOPERIDOL 5 MG/ML
1 INJECTION INTRAMUSCULAR
Status: DISCONTINUED | OUTPATIENT
Start: 2020-06-09 | End: 2020-06-09 | Stop reason: HOSPADM

## 2020-06-09 RX ORDER — DIPHENHYDRAMINE HYDROCHLORIDE 50 MG/ML
12.5 INJECTION INTRAMUSCULAR; INTRAVENOUS
Status: DISCONTINUED | OUTPATIENT
Start: 2020-06-09 | End: 2020-06-09 | Stop reason: HOSPADM

## 2020-06-09 RX ORDER — PHENYLEPHRINE HYDROCHLORIDE 10 MG/ML
INJECTION, SOLUTION INTRAMUSCULAR; INTRAVENOUS; SUBCUTANEOUS PRN
Status: DISCONTINUED | OUTPATIENT
Start: 2020-06-09 | End: 2020-06-09 | Stop reason: SURG

## 2020-06-09 RX ORDER — SODIUM CHLORIDE, SODIUM LACTATE, POTASSIUM CHLORIDE, CALCIUM CHLORIDE 600; 310; 30; 20 MG/100ML; MG/100ML; MG/100ML; MG/100ML
INJECTION, SOLUTION INTRAVENOUS CONTINUOUS
Status: DISCONTINUED | OUTPATIENT
Start: 2020-06-09 | End: 2020-06-09 | Stop reason: HOSPADM

## 2020-06-09 RX ORDER — KETOROLAC TROMETHAMINE 30 MG/ML
INJECTION, SOLUTION INTRAMUSCULAR; INTRAVENOUS PRN
Status: DISCONTINUED | OUTPATIENT
Start: 2020-06-09 | End: 2020-06-09 | Stop reason: SURG

## 2020-06-09 RX ORDER — TRIAMCINOLONE ACETONIDE 40 MG/ML
INJECTION, SUSPENSION INTRA-ARTICULAR; INTRAMUSCULAR
Status: DISCONTINUED | OUTPATIENT
Start: 2020-06-09 | End: 2020-06-09 | Stop reason: HOSPADM

## 2020-06-09 RX ORDER — BALANCED SALT SOLUTION ENRICHED WITH BICARBONATE, DEXTROSE, AND GLUTATHIONE
KIT INTRAOCULAR
Status: DISCONTINUED
Start: 2020-06-09 | End: 2020-06-09 | Stop reason: HOSPADM

## 2020-06-09 RX ORDER — PHENYLEPHRINE HYDROCHLORIDE 25 MG/ML
SOLUTION/ DROPS OPHTHALMIC
Status: COMPLETED
Start: 2020-06-09 | End: 2020-06-09

## 2020-06-09 RX ORDER — CEFAZOLIN SODIUM 1 G/3ML
INJECTION, POWDER, FOR SOLUTION INTRAMUSCULAR; INTRAVENOUS
Status: DISCONTINUED | OUTPATIENT
Start: 2020-06-09 | End: 2020-06-09 | Stop reason: HOSPADM

## 2020-06-09 RX ORDER — HYDROMORPHONE HYDROCHLORIDE 1 MG/ML
0.1 INJECTION, SOLUTION INTRAMUSCULAR; INTRAVENOUS; SUBCUTANEOUS
Status: DISCONTINUED | OUTPATIENT
Start: 2020-06-09 | End: 2020-06-09 | Stop reason: HOSPADM

## 2020-06-09 RX ORDER — MOXIFLOXACIN 5 MG/ML
SOLUTION/ DROPS OPHTHALMIC
Status: COMPLETED
Start: 2020-06-09 | End: 2020-06-09

## 2020-06-09 RX ORDER — MEPERIDINE HYDROCHLORIDE 25 MG/ML
25 INJECTION INTRAMUSCULAR; INTRAVENOUS; SUBCUTANEOUS
Status: DISCONTINUED | OUTPATIENT
Start: 2020-06-09 | End: 2020-06-09 | Stop reason: HOSPADM

## 2020-06-09 RX ORDER — FLURBIPROFEN SODIUM 0.3 MG/ML
SOLUTION/ DROPS OPHTHALMIC
Status: COMPLETED
Start: 2020-06-09 | End: 2020-06-09

## 2020-06-09 RX ORDER — LABETALOL HYDROCHLORIDE 5 MG/ML
5 INJECTION, SOLUTION INTRAVENOUS
Status: DISCONTINUED | OUTPATIENT
Start: 2020-06-09 | End: 2020-06-09 | Stop reason: HOSPADM

## 2020-06-09 RX ADMIN — PHENYLEPHRINE HYDROCHLORIDE 200 MCG: 10 INJECTION INTRAVENOUS at 12:58

## 2020-06-09 RX ADMIN — FENTANYL CITRATE 100 MCG: 50 INJECTION INTRAMUSCULAR; INTRAVENOUS at 12:39

## 2020-06-09 RX ADMIN — KETOROLAC TROMETHAMINE 30 MG: 30 INJECTION, SOLUTION INTRAMUSCULAR at 13:26

## 2020-06-09 RX ADMIN — PHENYLEPHRINE HYDROCHLORIDE 100 MCG: 10 INJECTION INTRAVENOUS at 12:50

## 2020-06-09 RX ADMIN — TROPICAMIDE 1 DROP: 10 SOLUTION/ DROPS OPHTHALMIC at 10:30

## 2020-06-09 RX ADMIN — MIDAZOLAM 2 MG: 1 INJECTION INTRAMUSCULAR; INTRAVENOUS at 12:34

## 2020-06-09 RX ADMIN — CYCLOPENTOLATE HYDROCHLORIDE 1 DROP: 10 SOLUTION/ DROPS OPHTHALMIC at 10:30

## 2020-06-09 RX ADMIN — LABETALOL HYDROCHLORIDE 5 MG: 5 INJECTION INTRAVENOUS at 14:15

## 2020-06-09 RX ADMIN — PHENYLEPHRINE HYDROCHLORIDE 200 MCG: 10 INJECTION INTRAVENOUS at 13:05

## 2020-06-09 RX ADMIN — LIDOCAINE HYDROCHLORIDE 80 MG: 20 INJECTION, SOLUTION EPIDURAL; INFILTRATION; INTRACAUDAL at 12:39

## 2020-06-09 RX ADMIN — PHENYLEPHRINE HYDROCHLORIDE: 2.5 SOLUTION/ DROPS OPHTHALMIC at 10:30

## 2020-06-09 RX ADMIN — FLURBIPROFEN SODIUM: 0.3 SOLUTION/ DROPS OPHTHALMIC at 10:30

## 2020-06-09 RX ADMIN — POVIDONE-IODINE 15 ML: 10 SOLUTION TOPICAL at 10:36

## 2020-06-09 RX ADMIN — ONDANSETRON 4 MG: 2 INJECTION INTRAMUSCULAR; INTRAVENOUS at 12:50

## 2020-06-09 RX ADMIN — SUGAMMADEX 200 MG: 100 INJECTION, SOLUTION INTRAVENOUS at 13:28

## 2020-06-09 RX ADMIN — MOXIFLOXACIN HYDROCHLORIDE: 5 SOLUTION/ DROPS OPHTHALMIC at 10:30

## 2020-06-09 RX ADMIN — ROCURONIUM BROMIDE 50 MG: 10 INJECTION, SOLUTION INTRAVENOUS at 12:39

## 2020-06-09 RX ADMIN — DEXAMETHASONE SODIUM PHOSPHATE 8 MG: 4 INJECTION, SOLUTION INTRA-ARTICULAR; INTRALESIONAL; INTRAMUSCULAR; INTRAVENOUS; SOFT TISSUE at 12:50

## 2020-06-09 RX ADMIN — SODIUM CHLORIDE, POTASSIUM CHLORIDE, SODIUM LACTATE AND CALCIUM CHLORIDE: 600; 310; 30; 20 INJECTION, SOLUTION INTRAVENOUS at 10:58

## 2020-06-09 RX ADMIN — PROPOFOL 200 MG: 10 INJECTION, EMULSION INTRAVENOUS at 12:39

## 2020-06-09 ASSESSMENT — PAIN SCALES - GENERAL: PAIN_LEVEL: 1

## 2020-06-09 ASSESSMENT — FIBROSIS 4 INDEX: FIB4 SCORE: 1.061093359533956759

## 2020-06-09 NOTE — OR NURSING
1336 -- Pt arrived from OR. Report received. Connected to monitor. #5 LMA in place w/ 6L O2 via blow-by mask. IVF infusing. Supine with left eye guaze and shield in place.    1345 -- BG 89    1356 -- LMA d/c'd. Pt remains on 6L O2 via mask.    1402 -- Cody translated plan to lay on stomach with pt. Assessed pain. Trying to wean O2. Currently on 2L via mask.    1415 -- Labetalol given for elevated BP.     1432 -- Pulse ox probe moved to ear. Now on 1L O2 via NC. Tolerating sips of water.    1453 -- Room air, tolerating well. Up to chair with assist. Sitting with face down on bedside table.    1510 -- Phase 1 completed. Report to LAVERN Ornelas. Transferred to phase 2.

## 2020-06-09 NOTE — ANESTHESIA PROCEDURE NOTES
Airway  Performed by: Ryan Barfield M.D.  Authorized by: Ryan Barfield M.D.     Location:  OR  Urgency:  Elective  Indications for Airway Management:  Anesthesia      Spontaneous Ventilation: absent    Sedation Level:  Deep  Preoxygenated: Yes    Final Airway Type:  Supraglottic airway  Final Supraglottic Airway:  Standard LMA    SGA Size:  5  Number of Attempts at Approach:  1

## 2020-06-09 NOTE — DISCHARGE INSTRUCTIONS
ACTIVITY: Rest and take it easy for the first 24 hours.  A responsible adult is recommended to remain with you during that time.  It is normal to feel sleepy.  We encourage you to not do anything that requires balance, judgment or coordination.    MILD FLU-LIKE SYMPTOMS ARE NORMAL. YOU MAY EXPERIENCE GENERALIZED MUSCLE ACHES, THROAT IRRITATION, HEADACHE AND/OR SOME NAUSEA.    FOR 24 HOURS DO NOT:  Drive, operate machinery or run household appliances.  Drink beer or alcoholic beverages.   Make important decisions or sign legal documents.    SPECIAL INSTRUCTIONS: Face down except when sleeping, may lie on either side. Keep dressing on until follow up with doctor.    DIET: To avoid nausea, slowly advance diet as tolerated, avoiding spicy or greasy foods for the first day.  Add more substantial food to your diet according to your physician's instructions.  INCREASE FLUIDS AND FIBER TO AVOID CONSTIPATION.    SURGICAL DRESSING/BATHING: Do not remove dressing until follow up with doctor. Do not get dressing wet.    FOLLOW-UP APPOINTMENT: Wednesday 6/10/20 at 10:55a with Dr. Miguel    You should CALL YOUR PHYSICIAN if you develop:  Fever greater than 101 degrees F.  Pain not relieved by medication, or persistent nausea or vomiting.  Excessive bleeding (blood soaking through dressing) or unexpected drainage from the wound.  Extreme redness or swelling around the incision site, drainage of pus or foul smelling drainage.  Inability to urinate or empty your bladder within 8 hours.  Problems with breathing or chest pain.    You should call 911 if you develop problems with breathing or chest pain.  If you are unable to contact your doctor or surgical center, you should go to the nearest emergency room or urgent care center.  Physician's telephone #: [phone] Dr. Miguel    If any questions arise, call your doctor.  If your doctor is not available, please feel free to call the Surgical Center at [phone].  The Center  is open Monday through Friday from 7AM to 7PM.  You can also call the HEALTH HOTLINE open 24 hours/day, 7 days/week and speak to a nurse at [phone], or toll free at [phone].    A registered nurse may call you a few days after your surgery to see how you are doing after your procedure.    MEDICATIONS: Resume taking daily medication.  Take prescribed pain medication with food.  If no medication is prescribed, you may take non-aspirin pain medication if needed.  PAIN MEDICATION CAN BE VERY CONSTIPATING.  Take a stool softener or laxative such as senokot, pericolace, or milk of magnesia if needed.    Prescription given for n/a.  Last pain medication given at _______none____________.    If your physician has prescribed pain medication that includes Acetaminophen (Tylenol), do not take additional Acetaminophen (Tylenol) while taking the prescribed medication.    Depression / Suicide Risk    As you are discharged from this [facility] facility, it is important to learn how to keep safe from harming yourself.    Recognize the warning signs:  · Abrupt changes in personality, positive or negative- including increase in energy   · Giving away possessions  · Change in eating patterns- significant weight changes-  positive or negative  · Change in sleeping patterns- unable to sleep or sleeping all the time   · Unwillingness or inability to communicate  · Depression  · Unusual sadness, discouragement and loneliness  · Talk of wanting to die  · Neglect of personal appearance   · Rebelliousness- reckless behavior  · Withdrawal from people/activities they love  · Confusion- inability to concentrate     If you or a loved one observes any of these behaviors or has concerns about self-harm, here's what you can do:  · Talk about it- your feelings and reasons for harming yourself  · Remove any means that you might use to hurt yourself (examples: pills, rope, extension cords, firearm)  · Get professional help from the  community (Mental Health, Substance Abuse, psychological counseling)  · Do not be alone:Call your Safe Contact- someone whom you trust who will be there for you.  · Call your local CRISIS HOTLINE 894-9877 or 498-247-5543  · Call your local Children's Mobile Crisis Response Team [place] (140) 349-7898 or www.[employer].Publicate  · Call the toll free National Suicide Prevention Hotlines   · National Suicide Prevention Lifeline 577-116-GJGW (2026)  · National Hope Line Network 800-SUICIDE (738-9691)    Instrucciones Para [place]  (Home Care Instructions)    ACTIVIDAD: Descanse y tome todo con mucha calma las primeras 24 horas después de casanova cirugía.  Marta persona adulta responsable debe permanecer con usted rodrigo trevon periodo de tiempo.  Es normal sentirse sonoliento o sonolienta rodrigo esas primeras horas.  Le recomendamos que no deric nada que requiera equilibrio, jr decisiones a mucha coordinación de casanova parte.    NO DERIC ESTO PURANTE LAS PRIMERAS 24 HORAS:   Manejar o conducir algún vehiculo, operar maquinarias o utilizar electrodomesticos.   Beber cerveza o algún otro tipo de bebida alcohólica.   Jr decisiones importantes o firmar documentos legales.    INSTRUCCIONES ESPECIALES: *Face down except when sleeping, may lie on either side. Keep dressing on until follow up with doctor**    DIETA: Para evitar las nauseas, prosiga despacito con casanova dieta a medida que pueda ir tolerándola mejor, evite comidas muy condimentadas o grasosas rodrigo trevon primer día.  Vaya agregando comidas más substanciadas a casanova dieta a medida que asi lo indique casanova médica.  Los bebés pueden beber leche preparada o formula, ásl manny también leche del seno de la madre a medida que vayan teniendo hambre.  SIGA AGREGANDO LIQUIDOS Y COMIDAS CON FIBRA PARA EVITAR ESTREÑIMIENTO.    MANNY BAÑARSE Y CAMBIAR LOS VENDAJES DE LA CIRUGIA: Do not remove dressing until follow up with doctor. Do not get dressing wet.***    MEDICAMENTOS/MEDICINAS:  Amanuel  a horacio carmelita medicamentos diarios.  [place] los medicamentos que se le prescribe con un poco de comida.  Si no le prescribe ningún tipo de medicamento, entonces puede horacio medicinas para el dolor que no contienen aspirina, si las necesita.  LAS MEDICINAS PARA EL DOLOR PUEDEN ESTREÑIRLE MUCHO.  [place] un suavizante para el excremento o materia fecal (stool softener) o un laxativo doris por ejemplo: senokot, pericolase, o leche de magnesia, si lo necesita.    La prescripción la administro ***.  La ultima sosis de medicina para el dolor fue administrada ***.     Se debe hacer jason consulta medica con el doctor en ***, Líame para hacer la mike.    Usted debe LIAMAR A CASANOVA MEDICO si tiene los siguientes síntomas:   -   Jason fiebre más donato de 101 grados Fahrenheit.   -   Un dolor incesante aún con los medicamentos, o nauseas y vómito persistente.   -   Un sangrado excesivo (ivon que traspasa los vendajes o gasas) o algúln tipo de drenaje inesperado que proviene de la henda.     -   Un color castanon exagerado o hinchazón alrededor del área en donde se le hizo incisión o umesh, o un drenaje de pus o con olor nia proveniente de la henda.   -    La inhabilidad de orinar o vaciar casanova vejiga en 8 horas.   -    Problemas con a respiración o marques en el pecho.    Usted debe llamar al 911 si se presentan problemas con el dolor al respirar o el pecho.  Si no se puede ponnoer en comunicación con un medica o con el centro de cirugía, usted debe ir a la estación de emergencia (emergency room) más cercana o a un centro de atención de urgencia (urgent care center).  El teléfono del medico es: ***    LOS SÍNTOMAS DE UN LEVE RESFRIO SON MUY NORMALES.  ADEMÁS USTED PUEDE LLEGAR A SENTIR MARQUES GENERALES DE MÚSCULOS, IRRITACIÓN EN LA GARGANTA, MARQUES DE MARY Y/O UN POCO DE NAUSEAS.    Sie tiene alguna pregunta, llame a casanova médico.  Si casanova médico no se encuentra disponible, por favor llame al Centro de Cirugía at {Surgical Dept Numbers:[id number]}.   el Centro está abierto de Lunes a Viernes desde las 7:00 de la manana hasta las 7:00 de la noche.  evelio también puede llamar al [facility] o HEALTH HOTLINE.  Joselyn está abierto viente y cuatro horas por abisai, siete spaulding por semana, allí podrá hablar con jason enfermera.  Llame al [phone], o al número russel 7 [phone].    Mi firma a continuación indica que he recibido y entiendco estas instrucciones acera de los cuidados en la casa (Home Care Instructions)    evelio recibirá jason encuesta en la correspondencia en las siguientes semanas y le pedimos que por favor tome un momento para completar carlos alberto encuesta y regresaría a hosotros.  Nuestro objetivó es brindarle un cuidado muy singh y par lo tanto apreciamos carmelita coméntanos.  Muchas fallon por nando escogido el Centro de Cirugía de [facility].

## 2020-06-09 NOTE — OP REPORT
CC: Decrease vision OD  Preop Diagnosis OD: Vitreous hemorrhage from PDR OD  Postop Diagnosis: Same  Procedures:  23G Vitrectomy, EL OD  Anesthesia: General  Anesthesiologist: Dr. Barfield  Indication: This is a patient with history of retinal detachment who has decrease vision due to cataract and slicon oil. Risks, benefits and alternatives were discussed and patient wanted to proceed with the procedure.  Details: The correct eye was marked in the pre-op area and eye was marked. Patient was taken to the operating room and general anesthesia was induced and patient was prepped and draped. We started by marking a site 3 mm from the limbus in the inferior temporal quadrant. 23G trocars were then placed in a beveled fashion. Infusion was placed in the eye and visualized with a light pipe.One site superior nasally and another site superior temporally were then marked and trocars were placed 3mm from the limbus. We used the vitrector to remove the vitreous hemorrhage. We induced PVD and removed the subhyaloid hemorrhage. . Endolaser was then performed in the peripheral retina. Air fluid exchange was then performed and 20% SF6 was placed in the eye. The retina was flat.  The trocars were then removed. Post of ancef and dexmethasone were then injected subconjunctivally.  Patients face was cleaned and eye was patched and shielded and patient was taken to recovery in good condition.

## 2020-06-09 NOTE — OR NURSING
1510: Report received from Radha FELICIANO On pt. Pt. Up in recliner chair head down on bedside table and pillow.  1530: IV d/c'd friend brought to bedside, discharge instructions discussed. VSS pt meets discharge criteria.  1542: Pt. Escorted out via w/c Latonia ROGERS with pt and friend. Belonging with pt.

## 2020-06-09 NOTE — ANESTHESIA PREPROCEDURE EVALUATION
Relevant Problems   CARDIAC   (+) HTN (hypertension)       Physical Exam    Airway   Mallampati: II  TM distance: >3 FB  Neck ROM: full       Cardiovascular - normal exam  Rhythm: regular  Rate: normal  (-) murmur     Dental - normal exam           Pulmonary - normal exam  Breath sounds clear to auscultation     Abdominal    Neurological - normal exam                 Anesthesia Plan    ASA 3       Plan - general       Airway plan will be ETT        Induction: intravenous    Postoperative Plan: Postoperative administration of opioids is intended.    Pertinent diagnostic labs and testing reviewed    Informed Consent:    Anesthetic plan and risks discussed with patient.    Use of blood products discussed with: patient whom consented to blood products.

## 2020-06-09 NOTE — ANESTHESIA POSTPROCEDURE EVALUATION
Patient: Chino Deluca    Procedure Summary     Date:  06/09/20 Room / Location:  [facility] ROOM 24 / SURGERY SAME DAY [facility]    Anesthesia Start:  1234 Anesthesia Stop:  1339    Procedure:  VITRECTOMY, POSTERIOR PORTION- MAMBRANE PEEL POSS LASER GAS OR OIL (Left Eye) Diagnosis:  (VITREOUS HEMORRHAGE)    Surgeon:  Deonte Miguel M.D. Responsible Provider:  Ryan Barfield M.D.    Anesthesia Type:  general ASA Status:  3          Final Anesthesia Type: general  Last vitals  BP   Blood Pressure : (!) 182/82    Temp   36.3 °C (97.3 °F)    Pulse   Pulse: 73   Resp   12    SpO2   93 %      Anesthesia Post Evaluation    Patient location during evaluation: PACU  Patient participation: complete - patient participated  Level of consciousness: awake and alert  Pain score: 1    Airway patency: patent  Anesthetic complications: no  Cardiovascular status: hemodynamically stable  Respiratory status: acceptable  Hydration status: euvolemic    PONV: none           Nurse Pain Score: 1 (NPRS)

## 2020-06-09 NOTE — ANESTHESIA TIME REPORT
Anesthesia Start and Stop Event Times     Date Time Event    6/9/2020 1205 Ready for Procedure     1234 Anesthesia Start     1339 Anesthesia Stop        Responsible Staff  06/09/20    Name Role Begin End    Ryan Barfield M.D. Anesth 1234 1339        Preop Diagnosis (Free Text):  Pre-op Diagnosis     VITREOUS HEMORRHAGE        Preop Diagnosis (Codes):    Post op Diagnosis  Retinal hemorrhage      Premium Reason  Non-Premium    Comments:

## 2020-06-23 NOTE — DOCUMENTATION QUERY
"                                                                         Critical access hospital                                                                       Query Response Note      PATIENT:               PATRICIO BAE  ACCT #:                  [id number]  MRN:                     [id number]  :                      1955  ADMIT DATE:       2020 8:55 AM  DISCH DATE:        2020 3:42 PM  RESPONDING  PROVIDER #:        [id number]           QUERY TEXT:    Your help is requested in clarifying the unclear or unknown abbreviation of PDR. Please further specify in the medical record documentation.    NOTE:  If an appropriate response is not listed below, please respond with a new note.     The patient's Clinical Indicators include:  \"Preop Diagnosis OD: Vitreous hemorrhage from PDR OD\" Is noted in the OP Report        Query created by: Kaylyn Del Angel on 2020 4:21 PM    RESPONSE TEXT:    PDR = Proliferative Diabetic Retinopathy  OD = Right eye          Electronically signed by:  VANNESA TURK MD 2020 9:42 AM              "

## 2020-08-12 ENCOUNTER — HOSPITAL ENCOUNTER (INPATIENT)
Facility: MEDICAL CENTER | Age: 65
LOS: 8 days | DRG: 247 | End: 2020-08-20
Attending: EMERGENCY MEDICINE | Admitting: HOSPITALIST
Payer: COMMERCIAL

## 2020-08-12 ENCOUNTER — APPOINTMENT (OUTPATIENT)
Dept: RADIOLOGY | Facility: MEDICAL CENTER | Age: 65
DRG: 247 | End: 2020-08-12
Attending: EMERGENCY MEDICINE
Payer: COMMERCIAL

## 2020-08-12 DIAGNOSIS — I42.9 CARDIOMYOPATHY, UNSPECIFIED TYPE (HCC): ICD-10-CM

## 2020-08-12 DIAGNOSIS — J81.0 ACUTE PULMONARY EDEMA (HCC): ICD-10-CM

## 2020-08-12 DIAGNOSIS — R60.9 PERIPHERAL EDEMA: ICD-10-CM

## 2020-08-12 DIAGNOSIS — I25.10 CORONARY ARTERY DISEASE INVOLVING NATIVE CORONARY ARTERY OF NATIVE HEART WITHOUT ANGINA PECTORIS: ICD-10-CM

## 2020-08-12 DIAGNOSIS — Z79.4 TYPE 2 DIABETES MELLITUS WITH HYPERGLYCEMIA, WITH LONG-TERM CURRENT USE OF INSULIN (HCC): ICD-10-CM

## 2020-08-12 DIAGNOSIS — I50.21 ACUTE SYSTOLIC CONGESTIVE HEART FAILURE (HCC): ICD-10-CM

## 2020-08-12 DIAGNOSIS — I10 ESSENTIAL HYPERTENSION: ICD-10-CM

## 2020-08-12 DIAGNOSIS — E11.65 TYPE 2 DIABETES MELLITUS WITH HYPERGLYCEMIA, WITH LONG-TERM CURRENT USE OF INSULIN (HCC): ICD-10-CM

## 2020-08-12 DIAGNOSIS — N17.9 AKI (ACUTE KIDNEY INJURY) (HCC): ICD-10-CM

## 2020-08-12 PROBLEM — I50.9 DYSPNEA DUE TO CONGESTIVE HEART FAILURE (HCC): Status: ACTIVE | Noted: 2020-08-12

## 2020-08-12 PROBLEM — R79.89 ELEVATED TROPONIN: Status: ACTIVE | Noted: 2020-08-12

## 2020-08-12 PROBLEM — R74.8 ELEVATED LIVER ENZYMES: Status: ACTIVE | Noted: 2020-08-12

## 2020-08-12 LAB
ALBUMIN SERPL BCP-MCNC: 4.3 G/DL (ref 3.2–4.9)
ALBUMIN/GLOB SERPL: 1.8 G/DL
ALP SERPL-CCNC: 100 U/L (ref 30–99)
ALT SERPL-CCNC: 51 U/L (ref 2–50)
ANION GAP SERPL CALC-SCNC: 13 MMOL/L (ref 7–16)
AST SERPL-CCNC: 44 U/L (ref 12–45)
BASOPHILS # BLD AUTO: 0.5 % (ref 0–1.8)
BASOPHILS # BLD: 0.04 K/UL (ref 0–0.12)
BILIRUB SERPL-MCNC: 0.9 MG/DL (ref 0.1–1.5)
BUN SERPL-MCNC: 19 MG/DL (ref 8–22)
CALCIUM SERPL-MCNC: 9.3 MG/DL (ref 8.5–10.5)
CHLORIDE SERPL-SCNC: 104 MMOL/L (ref 96–112)
CO2 SERPL-SCNC: 23 MMOL/L (ref 20–33)
COVID ORDER STATUS COVID19: NORMAL
CREAT SERPL-MCNC: 0.9 MG/DL (ref 0.5–1.4)
EKG IMPRESSION: NORMAL
EOSINOPHIL # BLD AUTO: 0.51 K/UL (ref 0–0.51)
EOSINOPHIL NFR BLD: 6.3 % (ref 0–6.9)
ERYTHROCYTE [DISTWIDTH] IN BLOOD BY AUTOMATED COUNT: 43.1 FL (ref 35.9–50)
GLOBULIN SER CALC-MCNC: 2.4 G/DL (ref 1.9–3.5)
GLUCOSE BLD-MCNC: 174 MG/DL (ref 65–99)
GLUCOSE BLD-MCNC: 82 MG/DL (ref 65–99)
GLUCOSE SERPL-MCNC: 138 MG/DL (ref 65–99)
HCT VFR BLD AUTO: 40.8 % (ref 42–52)
HGB BLD-MCNC: 14 G/DL (ref 14–18)
IMM GRANULOCYTES # BLD AUTO: 0.02 K/UL (ref 0–0.11)
IMM GRANULOCYTES NFR BLD AUTO: 0.2 % (ref 0–0.9)
LYMPHOCYTES # BLD AUTO: 2.1 K/UL (ref 1–4.8)
LYMPHOCYTES NFR BLD: 25.7 % (ref 22–41)
MCH RBC QN AUTO: 30 PG (ref 27–33)
MCHC RBC AUTO-ENTMCNC: 34.3 G/DL (ref 33.7–35.3)
MCV RBC AUTO: 87.4 FL (ref 81.4–97.8)
MONOCYTES # BLD AUTO: 0.86 K/UL (ref 0–0.85)
MONOCYTES NFR BLD AUTO: 10.5 % (ref 0–13.4)
NEUTROPHILS # BLD AUTO: 4.63 K/UL (ref 1.82–7.42)
NEUTROPHILS NFR BLD: 56.8 % (ref 44–72)
NRBC # BLD AUTO: 0 K/UL
NRBC BLD-RTO: 0 /100 WBC
NT-PROBNP SERPL IA-MCNC: 1214 PG/ML (ref 0–125)
PLATELET # BLD AUTO: 192 K/UL (ref 164–446)
PMV BLD AUTO: 11 FL (ref 9–12.9)
POTASSIUM SERPL-SCNC: 4.2 MMOL/L (ref 3.6–5.5)
PROT SERPL-MCNC: 6.7 G/DL (ref 6–8.2)
RBC # BLD AUTO: 4.67 M/UL (ref 4.7–6.1)
SARS-COV-2 RNA RESP QL NAA+PROBE: NOTDETECTED
SODIUM SERPL-SCNC: 140 MMOL/L (ref 135–145)
SPECIMEN SOURCE: NORMAL
TROPONIN T SERPL-MCNC: 32 NG/L (ref 6–19)
TSH SERPL DL<=0.005 MIU/L-ACNC: 3.1 UIU/ML (ref 0.38–5.33)
WBC # BLD AUTO: 8.2 K/UL (ref 4.8–10.8)

## 2020-08-12 PROCEDURE — 71045 X-RAY EXAM CHEST 1 VIEW: CPT

## 2020-08-12 PROCEDURE — U0003 INFECTIOUS AGENT DETECTION BY NUCLEIC ACID (DNA OR RNA); SEVERE ACUTE RESPIRATORY SYNDROME CORONAVIRUS 2 (SARS-COV-2) (CORONAVIRUS DISEASE [COVID-19]), AMPLIFIED PROBE TECHNIQUE, MAKING USE OF HIGH THROUGHPUT TECHNOLOGIES AS DESCRIBED BY CMS-2020-01-R: HCPCS

## 2020-08-12 PROCEDURE — 700102 HCHG RX REV CODE 250 W/ 637 OVERRIDE(OP): Performed by: EMERGENCY MEDICINE

## 2020-08-12 PROCEDURE — A9270 NON-COVERED ITEM OR SERVICE: HCPCS | Performed by: HOSPITALIST

## 2020-08-12 PROCEDURE — A9270 NON-COVERED ITEM OR SERVICE: HCPCS | Performed by: EMERGENCY MEDICINE

## 2020-08-12 PROCEDURE — 84484 ASSAY OF TROPONIN QUANT: CPT

## 2020-08-12 PROCEDURE — C9803 HOPD COVID-19 SPEC COLLECT: HCPCS | Performed by: EMERGENCY MEDICINE

## 2020-08-12 PROCEDURE — 82962 GLUCOSE BLOOD TEST: CPT

## 2020-08-12 PROCEDURE — 99285 EMERGENCY DEPT VISIT HI MDM: CPT

## 2020-08-12 PROCEDURE — 93005 ELECTROCARDIOGRAM TRACING: CPT | Performed by: EMERGENCY MEDICINE

## 2020-08-12 PROCEDURE — 700111 HCHG RX REV CODE 636 W/ 250 OVERRIDE (IP): Performed by: HOSPITALIST

## 2020-08-12 PROCEDURE — 770020 HCHG ROOM/CARE - TELE (206)

## 2020-08-12 PROCEDURE — 84443 ASSAY THYROID STIM HORMONE: CPT

## 2020-08-12 PROCEDURE — 93005 ELECTROCARDIOGRAM TRACING: CPT

## 2020-08-12 PROCEDURE — 99223 1ST HOSP IP/OBS HIGH 75: CPT | Performed by: HOSPITALIST

## 2020-08-12 PROCEDURE — 700102 HCHG RX REV CODE 250 W/ 637 OVERRIDE(OP): Performed by: HOSPITALIST

## 2020-08-12 PROCEDURE — 96376 TX/PRO/DX INJ SAME DRUG ADON: CPT

## 2020-08-12 PROCEDURE — 80053 COMPREHEN METABOLIC PANEL: CPT

## 2020-08-12 PROCEDURE — 83880 ASSAY OF NATRIURETIC PEPTIDE: CPT

## 2020-08-12 PROCEDURE — 36415 COLL VENOUS BLD VENIPUNCTURE: CPT

## 2020-08-12 PROCEDURE — 96374 THER/PROPH/DIAG INJ IV PUSH: CPT

## 2020-08-12 PROCEDURE — 85025 COMPLETE CBC W/AUTO DIFF WBC: CPT

## 2020-08-12 PROCEDURE — 700111 HCHG RX REV CODE 636 W/ 250 OVERRIDE (IP): Performed by: EMERGENCY MEDICINE

## 2020-08-12 RX ORDER — FUROSEMIDE 10 MG/ML
20 INJECTION INTRAMUSCULAR; INTRAVENOUS ONCE
Status: COMPLETED | OUTPATIENT
Start: 2020-08-12 | End: 2020-08-12

## 2020-08-12 RX ORDER — FUROSEMIDE 10 MG/ML
40 INJECTION INTRAMUSCULAR; INTRAVENOUS
Status: DISCONTINUED | OUTPATIENT
Start: 2020-08-12 | End: 2020-08-15

## 2020-08-12 RX ORDER — BISACODYL 10 MG
10 SUPPOSITORY, RECTAL RECTAL
Status: DISCONTINUED | OUTPATIENT
Start: 2020-08-12 | End: 2020-08-20 | Stop reason: HOSPADM

## 2020-08-12 RX ORDER — POLYETHYLENE GLYCOL 3350 17 G/17G
1 POWDER, FOR SOLUTION ORAL
Status: DISCONTINUED | OUTPATIENT
Start: 2020-08-12 | End: 2020-08-20 | Stop reason: HOSPADM

## 2020-08-12 RX ORDER — ASPIRIN 325 MG
325 TABLET ORAL DAILY
Status: DISCONTINUED | OUTPATIENT
Start: 2020-08-13 | End: 2020-08-13

## 2020-08-12 RX ORDER — POTASSIUM CHLORIDE 20 MEQ/1
20 TABLET, EXTENDED RELEASE ORAL DAILY
Status: DISCONTINUED | OUTPATIENT
Start: 2020-08-13 | End: 2020-08-17

## 2020-08-12 RX ORDER — DEXTROSE MONOHYDRATE 25 G/50ML
50 INJECTION, SOLUTION INTRAVENOUS
Status: DISCONTINUED | OUTPATIENT
Start: 2020-08-12 | End: 2020-08-20 | Stop reason: HOSPADM

## 2020-08-12 RX ORDER — ATORVASTATIN CALCIUM 40 MG/1
40 TABLET, FILM COATED ORAL EVERY EVENING
Status: DISCONTINUED | OUTPATIENT
Start: 2020-08-12 | End: 2020-08-18

## 2020-08-12 RX ORDER — ONDANSETRON 2 MG/ML
4 INJECTION INTRAMUSCULAR; INTRAVENOUS EVERY 4 HOURS PRN
Status: DISCONTINUED | OUTPATIENT
Start: 2020-08-12 | End: 2020-08-20 | Stop reason: HOSPADM

## 2020-08-12 RX ORDER — ONDANSETRON 4 MG/1
4 TABLET, ORALLY DISINTEGRATING ORAL EVERY 4 HOURS PRN
Status: DISCONTINUED | OUTPATIENT
Start: 2020-08-12 | End: 2020-08-20 | Stop reason: HOSPADM

## 2020-08-12 RX ORDER — ENALAPRILAT 1.25 MG/ML
1.25 INJECTION INTRAVENOUS EVERY 6 HOURS PRN
Status: DISCONTINUED | OUTPATIENT
Start: 2020-08-12 | End: 2020-08-20 | Stop reason: HOSPADM

## 2020-08-12 RX ORDER — AMOXICILLIN 250 MG
2 CAPSULE ORAL 2 TIMES DAILY
Status: DISCONTINUED | OUTPATIENT
Start: 2020-08-12 | End: 2020-08-20 | Stop reason: HOSPADM

## 2020-08-12 RX ORDER — ACETAMINOPHEN 325 MG/1
650 TABLET ORAL EVERY 6 HOURS PRN
Status: DISCONTINUED | OUTPATIENT
Start: 2020-08-12 | End: 2020-08-20 | Stop reason: HOSPADM

## 2020-08-12 RX ORDER — LOSARTAN POTASSIUM 25 MG/1
25 TABLET ORAL EVERY EVENING
Status: DISCONTINUED | OUTPATIENT
Start: 2020-08-12 | End: 2020-08-20

## 2020-08-12 RX ADMIN — FUROSEMIDE 20 MG: 10 INJECTION, SOLUTION INTRAMUSCULAR; INTRAVENOUS at 15:08

## 2020-08-12 RX ADMIN — NITROGLYCERIN 1 INCH: 20 OINTMENT TOPICAL at 15:09

## 2020-08-12 RX ADMIN — FUROSEMIDE 40 MG: 10 INJECTION, SOLUTION INTRAMUSCULAR; INTRAVENOUS at 20:58

## 2020-08-12 RX ADMIN — ATORVASTATIN CALCIUM 40 MG: 40 TABLET, FILM COATED ORAL at 20:58

## 2020-08-12 RX ADMIN — LOSARTAN POTASSIUM 25 MG: 25 TABLET, FILM COATED ORAL at 20:58

## 2020-08-12 ASSESSMENT — LIFESTYLE VARIABLES
AVERAGE NUMBER OF DAYS PER WEEK YOU HAVE A DRINK CONTAINING ALCOHOL: 0
ALCOHOL_USE: NO
HAVE YOU EVER FELT YOU SHOULD CUT DOWN ON YOUR DRINKING: NO
EVER HAD A DRINK FIRST THING IN THE MORNING TO STEADY YOUR NERVES TO GET RID OF A HANGOVER: NO
TOTAL SCORE: 0
HAVE PEOPLE ANNOYED YOU BY CRITICIZING YOUR DRINKING: NO
ON A TYPICAL DAY WHEN YOU DRINK ALCOHOL HOW MANY DRINKS DO YOU HAVE: 0
EVER FELT BAD OR GUILTY ABOUT YOUR DRINKING: NO
DOES PATIENT WANT TO STOP DRINKING: NO
HOW MANY TIMES IN THE PAST YEAR HAVE YOU HAD 5 OR MORE DRINKS IN A DAY: 0
EVER_SMOKED: NEVER
TOTAL SCORE: 0
CONSUMPTION TOTAL: NEGATIVE
TOTAL SCORE: 0
DO YOU DRINK ALCOHOL: NO

## 2020-08-12 ASSESSMENT — ENCOUNTER SYMPTOMS
SHORTNESS OF BREATH: 1
NECK PAIN: 0
SORE THROAT: 0
FEVER: 0
HEMOPTYSIS: 0
ABDOMINAL PAIN: 0
NERVOUS/ANXIOUS: 0
BACK PAIN: 0
BLOOD IN STOOL: 0
DIZZINESS: 0
CHILLS: 0
COUGH: 1
HEADACHES: 0
DEPRESSION: 0
NAUSEA: 0
SPUTUM PRODUCTION: 0

## 2020-08-12 ASSESSMENT — COPD QUESTIONNAIRES
COPD SCREENING SCORE: 3
IN THE PAST 12 MONTHS DO YOU DO LESS THAN YOU USED TO BECAUSE OF YOUR BREATHING PROBLEMS: DISAGREE/UNSURE
DURING THE PAST 4 WEEKS HOW MUCH DID YOU FEEL SHORT OF BREATH: SOME OF THE TIME
DO YOU EVER COUGH UP ANY MUCUS OR PHLEGM?: NO/ONLY WITH OCCASIONAL COLDS OR INFECTIONS
HAVE YOU SMOKED AT LEAST 100 CIGARETTES IN YOUR ENTIRE LIFE: NO/DON'T KNOW

## 2020-08-12 ASSESSMENT — FIBROSIS 4 INDEX
FIB4 SCORE: 1.061093359533956759
FIB4 SCORE: 2.09

## 2020-08-12 ASSESSMENT — PATIENT HEALTH QUESTIONNAIRE - PHQ9
2. FEELING DOWN, DEPRESSED, IRRITABLE, OR HOPELESS: NOT AT ALL
1. LITTLE INTEREST OR PLEASURE IN DOING THINGS: NOT AT ALL
SUM OF ALL RESPONSES TO PHQ9 QUESTIONS 1 AND 2: 0

## 2020-08-12 NOTE — ED NOTES
Pt ambulatory to room from lobby with steady gait. Agree with triage note. Pt changed into gown and connected to monitor. Call light in reach.

## 2020-08-12 NOTE — ED PROVIDER NOTES
ED Provider Note    Scribed for Phoenix Claros M.D. by Socrates Madison. 8/12/2020  2:39 PM    Primary care provider: Cornel Diego P.A.-C.  Means of arrival: Walk in  History obtained from: Patient  History limited by: None    CHIEF COMPLAINT  Chief Complaint   Patient presents with   • Shortness of Breath     sent from [facility] , x2wks   • Leg Swelling     trace ankle swelling       HPI  Chino Deluca is a 65 y.o. male with history of diabetes and hypertension who presents to the Emergency Department complaining of shortness of breath onset 2 weeks ago. Patient states the shortness of breath is exacerbated with walking and when laying down. Denies any fevers, cough, abdominal pain, nausea, vomiting, or chest pain. Patient was initially evlauted at the [facility] earlier today and was sent to the ED for further evaluation.    PPE Note: I personally donned full PPE for all patient encounters during this visit, including being clean-shaven with an N95 respirator mask, gloves, and eye protection.     Scribe remained outside the patient's room and did not have any contact with the patient for the duration of patient encounter.      REVIEW OF SYSTEMS  Pertinent positives include shortness of breath. Pertinent negatives include no fevers, cough, abdominal pain, nausea, vomiting, chest pain.  All other systems reviewed and negative.    PAST MEDICAL HISTORY   has a past medical history of Cataract, Diabetes (HCC), Full dentures, Glaucoma, Heart valve disease, High cholesterol, Hypertension, Pneumonia, and Snoring.    SURGICAL HISTORY   has a past surgical history that includes other; other; other cardiac surgery; and vitrectomy posterior (Left, 6/9/2020).    SOCIAL HISTORY  Social History     Tobacco Use   • Smoking status: Never Smoker   • Smokeless tobacco: Never Used   Substance Use Topics   • Alcohol use: No   • Drug use: No      Social History     Substance and Sexual Activity   Drug  "Use No       FAMILY HISTORY  History reviewed. No pertinent family history.    CURRENT MEDICATIONS  Home Medications     Reviewed by Do Deiz (Pharmacy Tech) on 08/12/20 at 1718  Med List Status: Complete   Medication Last Dose Status   atorvastatin (LIPITOR) 40 MG Tab 8/11/2020 Active   insulin 70/30 (HUMULIN/NOVOLIN) (70-30) 100 UNIT/ML Suspension 8/11/2020 Active   losartan (COZAAR) 25 MG Tab 8/11/2020 Active   metFORMIN (GLUCOPHAGE) 500 MG Tab 8/11/2020 Active                ALLERGIES  No Known Allergies    PHYSICAL EXAM  VITAL SIGNS: BP (!) 176/82   Pulse 76   Temp 37 °C (98.6 °F) (Temporal)   Resp 20   Ht 1.676 m (5' 6\")   Wt 86.6 kg (190 lb 14.7 oz)   SpO2 96%   BMI 30.81 kg/m²   Constitutional: Well developed, Well nourished, No acute distress, Non-toxic appearance.   HENT: Normocephalic, Atraumatic, Bilateral external ears normal, Oropharynx moist, No oral exudates.   Eyes: PERRLA, EOMI, Conjunctiva normal, No discharge.   Neck: No tenderness, Supple, No stridor.   Lymphatic: No lymphadenopathy noted.   Cardiovascular: Normal heart rate, Normal rhythm.   Thorax & Lungs: Diffuse crackles bilateral bases, decreased airflow. No respiratory distress, No wheezing.   Abdomen: Soft, No tenderness, No masses, No pulsatile masses.   Skin: Warm, Dry, No erythema, No rash.   Extremities:, 2+ pitting edema from knees distally. No cyanosis.   Musculoskeletal: No tenderness to palpation or major deformities noted.  Intact distal pulses  Neurologic: Awake, alert. Moves all extremities spontaneously.  Psychiatric: Affect normal, Judgment normal, Mood normal.     LABS  Results for orders placed or performed during the hospital encounter of 08/12/20   CBC with Differential   Result Value Ref Range    WBC 8.2 4.8 - 10.8 K/uL    RBC 4.67 (L) 4.70 - 6.10 M/uL    Hemoglobin 14.0 14.0 - 18.0 g/dL    Hematocrit 40.8 (L) 42.0 - 52.0 %    MCV 87.4 81.4 - 97.8 fL    MCH 30.0 27.0 - 33.0 pg    MCHC 34.3 33.7 - 35.3 " g/dL    RDW 43.1 35.9 - 50.0 fL    Platelet Count 192 164 - 446 K/uL    MPV 11.0 9.0 - 12.9 fL    Neutrophils-Polys 56.80 44.00 - 72.00 %    Lymphocytes 25.70 22.00 - 41.00 %    Monocytes 10.50 0.00 - 13.40 %    Eosinophils 6.30 0.00 - 6.90 %    Basophils 0.50 0.00 - 1.80 %    Immature Granulocytes 0.20 0.00 - 0.90 %    Nucleated RBC 0.00 /100 WBC    Neutrophils (Absolute) 4.63 1.82 - 7.42 K/uL    Lymphs (Absolute) 2.10 1.00 - 4.80 K/uL    Monos (Absolute) 0.86 (H) 0.00 - 0.85 K/uL    Eos (Absolute) 0.51 0.00 - 0.51 K/uL    Baso (Absolute) 0.04 0.00 - 0.12 K/uL    Immature Granulocytes (abs) 0.02 0.00 - 0.11 K/uL    NRBC (Absolute) 0.00 K/uL   Complete Metabolic Panel (CMP)   Result Value Ref Range    Sodium 140 135 - 145 mmol/L    Potassium 4.2 3.6 - 5.5 mmol/L    Chloride 104 96 - 112 mmol/L    Co2 23 20 - 33 mmol/L    Anion Gap 13.0 7.0 - 16.0    Glucose 138 (H) 65 - 99 mg/dL    Bun 19 8 - 22 mg/dL    Creatinine 0.90 0.50 - 1.40 mg/dL    Calcium 9.3 8.5 - 10.5 mg/dL    AST(SGOT) 44 12 - 45 U/L    ALT(SGPT) 51 (H) 2 - 50 U/L    Alkaline Phosphatase 100 (H) 30 - 99 U/L    Total Bilirubin 0.9 0.1 - 1.5 mg/dL    Albumin 4.3 3.2 - 4.9 g/dL    Total Protein 6.7 6.0 - 8.2 g/dL    Globulin 2.4 1.9 - 3.5 g/dL    A-G Ratio 1.8 g/dL   Troponin   Result Value Ref Range    Troponin T 32 (H) 6 - 19 ng/L   ESTIMATED GFR   Result Value Ref Range    GFR If African American >60 >60 mL/min/1.73 m 2    GFR If Non African American >60 >60 mL/min/1.73 m 2   proBrain Natriuretic Peptide, NT   Result Value Ref Range    NT-proBNP 1214 (H) 0 - 125 pg/mL   COVID/SARS CoV-2 PCR    Specimen: Nasopharyngeal; Respirate   Result Value Ref Range    COVID Order Status Received    SARS-CoV-2, PCR (In-House)   Result Value Ref Range    SARS-CoV-2 Source NP Swab     SARS-CoV-2 by PCR NotDetected    ACCU-CHEK GLUCOSE   Result Value Ref Range    Glucose - Accu-Ck 82 65 - 99 mg/dL   EKG (NOW)   Result Value Ref Range    Report       [facility]  [facility] Emergency Dept.    Test Date:  2020  Pt Name:    PATRICIO HAMMONDS     Department: ER  MRN:        [id number]                      Room:  Gender:     Male                         Technician: [id number]  :        1955                   Requested By:ER TRIAGE PROTOCOL  Order #:    [id number]                    Reading MD: KAYE STARKEY MD    Measurements  Intervals                                Axis  Rate:       81                           P:          25  OR:         240                          QRS:        -45  QRSD:       106                          T:          156  QT:         384  QTc:        446    Interpretive Statements  SINUS RHYTHM  FIRST DEGREE AV BLOCK  LEFT ANTERIOR FASCICULAR BLOCK  LATE PRECORDIAL R/S TRANSITION  PROBABLE LVH WITH SECONDARY REPOL ABNRM  Compared to ECG 2020 10:19:38  Left anterior fascicular block now present  Left-axis deviation no longer present  T-wave abnormality no longer present  Possible isch emia no longer present  Electronically Signed On 2020 14:41:45 PDT by KAYE STARKEY MD          EKG  See Labs section. Interpreted by me.     RADIOLOGY  DX-CHEST-PORTABLE (1 VIEW)   Final Result      1.  There are ill-defined bilateral interstitial opacities with a trace of left pleural fluid. Findings are most consistent with edema. Pneumonitis is considered possible but less likely.      EC-ECHOCARDIOGRAM COMPLETE W/O CONT    (Results Pending)     The radiologist's interpretation of all radiological studies have been reviewed by me.      COURSE & MEDICAL DECISION MAKING  Pertinent Labs & Imaging studies reviewed. (See chart for details)    I reviewed the patient's medical records which showed patient has history of diabetes and hypertension.    2:39 PM - Patient seen and examined at bedside. Patient will be treated with Lasix injection 20 mg, Nitro-BID 2+ ointment 1 inch. Ordered DX chest, COVID/SARS CoV-2 PCR, BNP, estimated GFR, CBC  with differential, CMP, troponin, EKG to evaluate his symptoms. The differential diagnoses include but are not limited to: pulmonary edema, COVID-19, pneumonia.    4:34 PM Paged hospitalist    5:09 PM - I discussed the patient's case and the above findings with Dr. Victoria (hospitalist) who will consult patient for hospitalization.     Decision Making:  Patient with respiratory distress, pulmonary edema on chest x-ray, COVID-19 is negative, believe the patient is in fluid overload, new onset CHF, give the patient nitroglycerin, Lasix, discussed case with hospitalist for hospitalization        DISPOSITION:  Patient will be hospitalized by Dr. Victoria in guarded condition.     FINAL IMPRESSION  1. Acute pulmonary edema (HCC)    2. Peripheral edema          I, Socrates Madison (Scribe), am scribing for, and in the presence of, Phoenix Claros M.D..    Electronically signed by: Socrates Madison (Scribe), 8/12/2020    I, Phoenix Claros M.D. personally performed the services described in this documentation, as scribed by Socrates Madison in my presence, and it is both accurate and complete. C    The note accurately reflects work and decisions made by me.  Phoenix Claros M.D.  8/12/2020  9:17 PM

## 2020-08-12 NOTE — ED TRIAGE NOTES
Chief Complaint   Patient presents with   • Shortness of Breath     sent from [facility] , x2wks   • Leg Swelling     trace ankle swelling   Lao speaking  [id number]  Pt ambulated to triage after ekg with above complaints. Pt denies fever or cough.   Sob increase with activity, unable to sleep flat at night.

## 2020-08-13 ENCOUNTER — APPOINTMENT (OUTPATIENT)
Dept: CARDIOLOGY | Facility: MEDICAL CENTER | Age: 65
DRG: 247 | End: 2020-08-13
Attending: HOSPITALIST
Payer: COMMERCIAL

## 2020-08-13 LAB
ANION GAP SERPL CALC-SCNC: 14 MMOL/L (ref 7–16)
BASOPHILS # BLD AUTO: 0.5 % (ref 0–1.8)
BASOPHILS # BLD: 0.05 K/UL (ref 0–0.12)
BUN SERPL-MCNC: 20 MG/DL (ref 8–22)
CALCIUM SERPL-MCNC: 9.7 MG/DL (ref 8.5–10.5)
CHLORIDE SERPL-SCNC: 97 MMOL/L (ref 96–112)
CO2 SERPL-SCNC: 26 MMOL/L (ref 20–33)
CREAT SERPL-MCNC: 0.74 MG/DL (ref 0.5–1.4)
EOSINOPHIL # BLD AUTO: 0.57 K/UL (ref 0–0.51)
EOSINOPHIL NFR BLD: 6.2 % (ref 0–6.9)
ERYTHROCYTE [DISTWIDTH] IN BLOOD BY AUTOMATED COUNT: 42.1 FL (ref 35.9–50)
GLUCOSE BLD-MCNC: 170 MG/DL (ref 65–99)
GLUCOSE BLD-MCNC: 176 MG/DL (ref 65–99)
GLUCOSE BLD-MCNC: 254 MG/DL (ref 65–99)
GLUCOSE BLD-MCNC: 275 MG/DL (ref 65–99)
GLUCOSE BLD-MCNC: 280 MG/DL (ref 65–99)
GLUCOSE SERPL-MCNC: 177 MG/DL (ref 65–99)
HCT VFR BLD AUTO: 43.3 % (ref 42–52)
HGB BLD-MCNC: 14.7 G/DL (ref 14–18)
IMM GRANULOCYTES # BLD AUTO: 0.02 K/UL (ref 0–0.11)
IMM GRANULOCYTES NFR BLD AUTO: 0.2 % (ref 0–0.9)
LYMPHOCYTES # BLD AUTO: 2.08 K/UL (ref 1–4.8)
LYMPHOCYTES NFR BLD: 22.8 % (ref 22–41)
MCH RBC QN AUTO: 29.6 PG (ref 27–33)
MCHC RBC AUTO-ENTMCNC: 33.9 G/DL (ref 33.7–35.3)
MCV RBC AUTO: 87.3 FL (ref 81.4–97.8)
MONOCYTES # BLD AUTO: 1.13 K/UL (ref 0–0.85)
MONOCYTES NFR BLD AUTO: 12.4 % (ref 0–13.4)
NEUTROPHILS # BLD AUTO: 5.28 K/UL (ref 1.82–7.42)
NEUTROPHILS NFR BLD: 57.9 % (ref 44–72)
NRBC # BLD AUTO: 0 K/UL
NRBC BLD-RTO: 0 /100 WBC
NT-PROBNP SERPL IA-MCNC: 1182 PG/ML (ref 0–125)
PLATELET # BLD AUTO: 190 K/UL (ref 164–446)
PMV BLD AUTO: 10.6 FL (ref 9–12.9)
POTASSIUM SERPL-SCNC: 3.4 MMOL/L (ref 3.6–5.5)
RBC # BLD AUTO: 4.96 M/UL (ref 4.7–6.1)
SODIUM SERPL-SCNC: 137 MMOL/L (ref 135–145)
TROPONIN T SERPL-MCNC: 36 NG/L (ref 6–19)
WBC # BLD AUTO: 9.1 K/UL (ref 4.8–10.8)

## 2020-08-13 PROCEDURE — 83880 ASSAY OF NATRIURETIC PEPTIDE: CPT

## 2020-08-13 PROCEDURE — 93306 TTE W/DOPPLER COMPLETE: CPT

## 2020-08-13 PROCEDURE — 84484 ASSAY OF TROPONIN QUANT: CPT

## 2020-08-13 PROCEDURE — 82962 GLUCOSE BLOOD TEST: CPT | Mod: 91

## 2020-08-13 PROCEDURE — 80048 BASIC METABOLIC PNL TOTAL CA: CPT

## 2020-08-13 PROCEDURE — 700102 HCHG RX REV CODE 250 W/ 637 OVERRIDE(OP): Performed by: HOSPITALIST

## 2020-08-13 PROCEDURE — 85025 COMPLETE CBC W/AUTO DIFF WBC: CPT

## 2020-08-13 PROCEDURE — 99232 SBSQ HOSP IP/OBS MODERATE 35: CPT | Performed by: STUDENT IN AN ORGANIZED HEALTH CARE EDUCATION/TRAINING PROGRAM

## 2020-08-13 PROCEDURE — 700117 HCHG RX CONTRAST REV CODE 255: Performed by: HOSPITALIST

## 2020-08-13 PROCEDURE — A9270 NON-COVERED ITEM OR SERVICE: HCPCS | Performed by: HOSPITALIST

## 2020-08-13 PROCEDURE — 36415 COLL VENOUS BLD VENIPUNCTURE: CPT

## 2020-08-13 PROCEDURE — 770020 HCHG ROOM/CARE - TELE (206)

## 2020-08-13 PROCEDURE — 700111 HCHG RX REV CODE 636 W/ 250 OVERRIDE (IP): Performed by: HOSPITALIST

## 2020-08-13 RX ADMIN — INSULIN HUMAN 5 UNITS: 100 INJECTION, SOLUTION PARENTERAL at 12:12

## 2020-08-13 RX ADMIN — INSULIN HUMAN 5 UNITS: 100 INJECTION, SOLUTION PARENTERAL at 20:31

## 2020-08-13 RX ADMIN — LOSARTAN POTASSIUM 25 MG: 25 TABLET, FILM COATED ORAL at 17:47

## 2020-08-13 RX ADMIN — FUROSEMIDE 40 MG: 10 INJECTION, SOLUTION INTRAMUSCULAR; INTRAVENOUS at 05:23

## 2020-08-13 RX ADMIN — HUMAN ALBUMIN MICROSPHERES AND PERFLUTREN 3 ML: 10; .22 INJECTION, SOLUTION INTRAVENOUS at 19:40

## 2020-08-13 RX ADMIN — INSULIN HUMAN 15 UNITS: 100 INJECTION, SUSPENSION SUBCUTANEOUS at 17:50

## 2020-08-13 RX ADMIN — ENOXAPARIN SODIUM 40 MG: 40 INJECTION SUBCUTANEOUS at 05:23

## 2020-08-13 RX ADMIN — ATORVASTATIN CALCIUM 40 MG: 40 TABLET, FILM COATED ORAL at 17:47

## 2020-08-13 RX ADMIN — POTASSIUM CHLORIDE 20 MEQ: 1500 TABLET, EXTENDED RELEASE ORAL at 05:24

## 2020-08-13 RX ADMIN — FUROSEMIDE 40 MG: 10 INJECTION, SOLUTION INTRAMUSCULAR; INTRAVENOUS at 17:47

## 2020-08-13 RX ADMIN — INSULIN HUMAN 5 UNITS: 100 INJECTION, SOLUTION PARENTERAL at 17:50

## 2020-08-13 RX ADMIN — ASPIRIN 325 MG: 325 TABLET ORAL at 05:24

## 2020-08-13 ASSESSMENT — ENCOUNTER SYMPTOMS
COUGH: 0
GASTROINTESTINAL NEGATIVE: 1
WHEEZING: 0
PALPITATIONS: 0
CONSTITUTIONAL NEGATIVE: 1
PSYCHIATRIC NEGATIVE: 1
EYES NEGATIVE: 1
NEUROLOGICAL NEGATIVE: 1
SHORTNESS OF BREATH: 1
MUSCULOSKELETAL NEGATIVE: 1
PND: 1
SPUTUM PRODUCTION: 0
ORTHOPNEA: 1

## 2020-08-13 ASSESSMENT — COGNITIVE AND FUNCTIONAL STATUS - GENERAL
SUGGESTED CMS G CODE MODIFIER DAILY ACTIVITY: CH
MOBILITY SCORE: 22
CLIMB 3 TO 5 STEPS WITH RAILING: A LITTLE
SUGGESTED CMS G CODE MODIFIER MOBILITY: CJ
WALKING IN HOSPITAL ROOM: A LITTLE
DAILY ACTIVITIY SCORE: 24

## 2020-08-13 ASSESSMENT — FIBROSIS 4 INDEX: FIB4 SCORE: 2.11

## 2020-08-13 NOTE — DIETARY
Nutrition Services: Day 1 of admit.  Chino Deluca is a 65 y.o. male with admitting DX of CHF (congestive heart failure) ([facility])    Consult received for DM diet education.  Pertinent labs (5/2020): HA1c 17.2%,     Attempted diet education this afternoon, but pt was sleeping. Left various handouts (in Lithuanian) on bedside table, including CHO-counting, 5-day sample menus, low-sodium diet, and hypertriglyceridemia nutrition therapy.   CDE already provided pt with DM booklet (Lithuanian).     Please contact RD if pt would like to discuss diet recommendations prior to D/c.

## 2020-08-13 NOTE — PROGRESS NOTES
2 RN skin check complete.   Devices in place NA.  Skin assessed under devices NA.  Confirmed pressure ulcers found on NA.  New potential pressure ulcers noted on NA. Wound consult placed NA.  The following interventions in place: drawsheet in place for repositioning, extra pillows and blankets given, patient has silicone nasal cannula.    Patient skin is CDI.

## 2020-08-13 NOTE — ED NOTES
Report given to LAVERN Gee. Pt transferred to T717 with RN on 4L NC, all belongings accounted for.

## 2020-08-13 NOTE — ED NOTES
Med Rec complete per phone interview with Pt's family  Allergies Reviewed  No ABX in the last 14 days

## 2020-08-13 NOTE — CARE PLAN
Problem: Communication  Goal: The ability to communicate needs accurately and effectively will improve  Outcome: PROGRESSING SLOWER THAN EXPECTED     Problem: Safety  Goal: Will remain free from injury  Outcome: PROGRESSING AS EXPECTED  Goal: Will remain free from falls  Outcome: PROGRESSING AS EXPECTED

## 2020-08-13 NOTE — ASSESSMENT & PLAN NOTE
Question of secondary to hepatic congestion  elevated BNP and checking echocardiogram  Diuresis  Monitor CMP  Obese with fatty liver being possible component

## 2020-08-13 NOTE — H&P
Hospital Medicine History & Physical Note    Date of Service  2020    Primary Care Physician  Cornel Diego P.A.-C.    Consultants  none    Code Status  Full Code    Chief Complaint  Chief Complaint   Patient presents with   • Shortness of Breath     sent from [facility] , x2wks   • Leg Swelling     trace ankle swelling       History of Presenting Illness  Mongolian speaking 65 y.o. male with uncontrolled diabetes, HTN, dyslipidemia who presented 2020 with increasing SOB with exertion over the past two weeks.  He has no fever, no chest pain.  He has had increase leg edema as well.  He states he cannot lay flat on his back w/o increase difficulty in breathing.       In the ER he is COVID negative.  He has an elevated troponin and BNP. On exam has leg edema.      Review of Systems  Review of Systems   Constitutional: Positive for malaise/fatigue. Negative for chills and fever.   HENT: Negative for sore throat.    Respiratory: Positive for cough and shortness of breath. Negative for hemoptysis and sputum production.    Cardiovascular: Positive for leg swelling. Negative for chest pain.   Gastrointestinal: Negative for abdominal pain, blood in stool and nausea.   Genitourinary: Negative for dysuria and hematuria.   Musculoskeletal: Negative for back pain and neck pain.   Neurological: Negative for dizziness and headaches.   Psychiatric/Behavioral: Negative for depression. The patient is not nervous/anxious.        Past Medical History   has a past medical history of Cataract, Diabetes (HCC), Full dentures, Glaucoma, Heart valve disease, High cholesterol, Hypertension, Pneumonia, and Snoring.    Surgical History   has a past surgical history that includes other; other; other cardiac surgery; and vitrectomy posterior (Left, 2020).     Family History  Parents are .       Social History   reports that he has never smoked. He has never used smokeless tobacco. He reports that he does not drink  alcohol or use drugs. and has 3 children.     Allergies  No Known Allergies    Medications  Prior to Admission Medications   Prescriptions Last Dose Informant Patient Reported? Taking?   atorvastatin (LIPITOR) 40 MG Tab 8/11/2020 at PM Friend No No   Sig: Take 1 Tab by mouth every evening.   insulin 70/30 (HUMULIN/NOVOLIN) (70-30) 100 UNIT/ML Suspension 8/11/2020 at PM Friend No No   Sig: Inject 15 Units as instructed 2 Times a Day.   losartan (COZAAR) 25 MG Tab 8/11/2020 at PM Friend Yes No   Sig: Take 25 mg by mouth every evening.   metFORMIN (GLUCOPHAGE) 500 MG Tab 8/11/2020 at PM Friend Yes No   Sig: Take 500 mg by mouth 2 times a day.      Facility-Administered Medications: None       Physical Exam  Temp:  [36.1 °C (97 °F)-37 °C (98.6 °F)] 37 °C (98.6 °F)  Pulse:  [70-83] 70  Resp:  [12-21] 12  BP: (132-176)/(63-98) 132/75  SpO2:  [86 %-96 %] 95 %    Physical Exam  Vitals signs reviewed.   Constitutional:       Appearance: Normal appearance. He is not diaphoretic.   HENT:      Head: Normocephalic and atraumatic.      Nose: Nose normal.      Mouth/Throat:      Mouth: Mucous membranes are moist.      Pharynx: No oropharyngeal exudate.   Eyes:      General: No scleral icterus.        Right eye: No discharge.         Left eye: No discharge.      Extraocular Movements: Extraocular movements intact.      Conjunctiva/sclera: Conjunctivae normal.   Neck:      Musculoskeletal: Neck supple. No muscular tenderness.   Cardiovascular:      Rate and Rhythm: Normal rate and regular rhythm.      Pulses:           Radial pulses are 2+ on the right side and 2+ on the left side.        Dorsalis pedis pulses are 2+ on the right side and 2+ on the left side.      Heart sounds: No murmur.   Pulmonary:      Effort: Pulmonary effort is normal. No respiratory distress.      Breath sounds: Normal breath sounds. No wheezing or rales.   Abdominal:      General: Bowel sounds are normal. There is no distension.      Palpations:  Abdomen is soft.   Musculoskeletal:         General: No swelling or tenderness.      Right lower leg: Edema (1+ bilateral leg edema) present.      Left lower leg: Edema present.   Lymphadenopathy:      Cervical: No cervical adenopathy.   Skin:     Coloration: Skin is not jaundiced or pale.   Neurological:      General: No focal deficit present.      Mental Status: He is alert and oriented to person, place, and time. Mental status is at baseline.      Cranial Nerves: No cranial nerve deficit.   Psychiatric:         Mood and Affect: Mood normal.         Behavior: Behavior normal.         Laboratory:  Recent Labs     08/12/20  1335   WBC 8.2   RBC 4.67*   HEMOGLOBIN 14.0   HEMATOCRIT 40.8*   MCV 87.4   MCH 30.0   MCHC 34.3   RDW 43.1   PLATELETCT 192   MPV 11.0     Recent Labs     08/12/20  1335   SODIUM 140   POTASSIUM 4.2   CHLORIDE 104   CO2 23   GLUCOSE 138*   BUN 19   CREATININE 0.90   CALCIUM 9.3     Recent Labs     08/12/20  1335   ALTSGPT 51*   ASTSGOT 44   ALKPHOSPHAT 100*   TBILIRUBIN 0.9   GLUCOSE 138*         Recent Labs     08/12/20  1335   NTPROBNP 1214*         Recent Labs     08/12/20  1335   TROPONINT 32*       Imaging:  DX-CHEST-PORTABLE (1 VIEW)   Final Result      1.  There are ill-defined bilateral interstitial opacities with a trace of left pleural fluid. Findings are most consistent with edema. Pneumonitis is considered possible but less likely.      EC-ECHOCARDIOGRAM COMPLETE W/O CONT    (Results Pending)         Assessment/Plan:  I anticipate this patient will require at least two midnights for appropriate medical management, necessitating inpatient admission.    Essential hypertension  Assessment & Plan  Monitor vitals  Continue with active treatment with losartan 25 mg with parameters.  Diuresis with Lasix    Dyspnea due to congestive heart failure (HCC)  Assessment & Plan  Increasing shortness of breath with exertion.  Elevated BNP and leg edema concerning for heart failure.  I ordered an  echocardiogram and it is pending  Diuresis with Lasix continue losartan  Hold off on beta-blocker until acute component of CHF is improved    Elevated troponin  Assessment & Plan  Monitor on telemetry  Serial troponins  Aspirin and statin check fasting lipids    Elevated liver enzymes  Assessment & Plan  Question of secondary to hepatic congestion  elevated BNP and checking echocardiogram  Diuresis  Monitor CMP  Obese with fatty liver being possible component    DM (diabetes mellitus) (HCC)  Assessment & Plan  Grossly uncontrolled diabetic with a A1c of 17.2 in the past 3 months  Monitor Accu-Cheks continue with his outpatient Humulin.  Diabetic diet  He needs ongoing diabetic education  Check TSH

## 2020-08-13 NOTE — PROGRESS NOTES
Hospital Medicine Daily Progress Note    Date of Service  8/13/2020    Chief Complaint  65 y.o. male admitted 8/12/2020 with 2 weeks history of exertional dyspnea and leg swelling.    Hospital Course    Romanian speaking 65 y.o. male with uncontrolled diabetes, HTN, dyslipidemia who presented 8/12/2020 with increasing SOB with exertion over the past two weeks.  He has no fever, no chest pain.  He has had increase leg edema as well.  He states he cannot lay flat on his back w/o increase difficulty in breathing.     He has an elevated troponin and BNP. On exam has leg edema.      Interval Problem Update   services were used in the patient's primary language of English.     Name or Number:  Mode of interpretation: iPad    No acute events overnight. Patient notes a history of valvular heart disorder about 4 years ago at [facility]. Denies ever being on diuretic outpatient.  TTE pending.    Consultants/Specialty  None    Code Status  Full Code    Disposition  Home    Review of Systems  Review of Systems   Constitutional: Negative.    HENT: Negative.    Eyes: Negative.    Respiratory: Positive for shortness of breath. Negative for cough, sputum production and wheezing.    Cardiovascular: Positive for orthopnea, leg swelling and PND. Negative for chest pain and palpitations.   Gastrointestinal: Negative.    Genitourinary: Negative.    Musculoskeletal: Negative.    Skin: Negative.    Neurological: Negative.    Endo/Heme/Allergies: Negative.    Psychiatric/Behavioral: Negative.         Physical Exam  Temp:  [35.8 °C (96.5 °F)-37 °C (98.6 °F)] 35.8 °C (96.5 °F)  Pulse:  [70-84] 84  Resp:  [12-21] 17  BP: (132-176)/(63-91) 154/86  SpO2:  [86 %-97 %] 92 %    Physical Exam  Constitutional:       Appearance: Normal appearance.   HENT:      Head: Normocephalic and atraumatic.   Eyes:      Conjunctiva/sclera: Conjunctivae normal.      Pupils: Pupils are equal, round, and reactive to light.   Neck:       Musculoskeletal: Normal range of motion and neck supple.   Cardiovascular:      Rate and Rhythm: Normal rate.      Pulses: Normal pulses.      Heart sounds: Murmur present.   Pulmonary:      Effort: Pulmonary effort is normal. No respiratory distress.      Breath sounds: Rales present. No wheezing.   Abdominal:      General: Bowel sounds are normal.      Palpations: Abdomen is soft.   Musculoskeletal: Normal range of motion.   Skin:     General: Skin is warm.      Comments: Chest-midline scars from prior cardiac surgery.   Neurological:      General: No focal deficit present.      Mental Status: He is alert and oriented to person, place, and time.   Psychiatric:         Mood and Affect: Mood normal.         Behavior: Behavior normal.         Fluids    Intake/Output Summary (Last 24 hours) at 8/13/2020 1149  Last data filed at 8/13/2020 0908  Gross per 24 hour   Intake 240 ml   Output 2450 ml   Net -2210 ml       Laboratory  Recent Labs     08/12/20  1335 08/13/20  0335   WBC 8.2 9.1   RBC 4.67* 4.96   HEMOGLOBIN 14.0 14.7   HEMATOCRIT 40.8* 43.3   MCV 87.4 87.3   MCH 30.0 29.6   MCHC 34.3 33.9   RDW 43.1 42.1   PLATELETCT 192 190   MPV 11.0 10.6     Recent Labs     08/12/20  1335 08/13/20  0338   SODIUM 140 137   POTASSIUM 4.2 3.4*   CHLORIDE 104 97   CO2 23 26   GLUCOSE 138* 177*   BUN 19 20   CREATININE 0.90 0.74   CALCIUM 9.3 9.7                   Imaging  DX-CHEST-PORTABLE (1 VIEW)   Final Result      1.  There are ill-defined bilateral interstitial opacities with a trace of left pleural fluid. Findings are most consistent with edema. Pneumonitis is considered possible but less likely.      EC-ECHOCARDIOGRAM COMPLETE W/O CONT    (Results Pending)        Assessment/Plan  Essential hypertension  Assessment & Plan  Monitor vitals  Continue with active treatment with losartan 25 mg with parameters.  Diuresis with Lasix      Acute congestive heart failure (HCC)  Assessment & Plan  Increasing shortness of breath with  exertion.  Elevated BNP and leg edema concerning for heart failure.  Patient has a history of valvular heart surgery about 4 years ago with [facility].  Has never been on diuretic outpatient.  Symptoms started about 2 weeks ago with no alarming signs/triggers.  Diuresis with IV Lasix 40 milligrams IV twice daily.  Monitor electrolytes.  Monitor kidney function.  Strict I's and O's /daily weights.  Echocardiogram pending  Continue losartan, Hold off on beta-blocker until acute component of CHF is improved.  May need to get records from [facility].  Wean oxygen as tolerated.    Elevated troponin  Assessment & Plan  Monitor on telemetry  Serial troponins  Aspirin and statin check fasting lipids      Elevated liver enzymes  Assessment & Plan  Question of secondary to hepatic congestion  elevated BNP and checking echocardiogram  Diuresis  Monitor CMP  Obese with fatty liver being possible component      Type 2 diabetes mellitus, with long-term current use of insulin (HCC)  Assessment & Plan  Grossly uncontrolled diabetic with a A1c of 17.2 in the past 3 months  Monitor Accu-Cheks continue with his outpatient Humulin.  Diabetic diet  He needs ongoing diabetic education            VTE prophylaxis: Lovenox

## 2020-08-13 NOTE — PROGRESS NOTES
12 hr cc          Monitor summary:  SR 71-82  With rare PVC and rare PAC  With first degree HB  .24/.08/.30

## 2020-08-13 NOTE — ED NOTES
Report received from LAVERN Urias. Pt resting comfortably in bed, declining any needs at this time. Pt updated on POC and verbalized understanding. Pt awaiting room assignment

## 2020-08-13 NOTE — PROGRESS NOTES
Handoff report received from ED nurse, Patient AAOx4, on 2.5L NC, on tele monitoring, and VSS. Patient educated on use of call light, bed in lowest and locked position, will continue to monitor.

## 2020-08-13 NOTE — HEART FAILURE PROGRAM
Cardiovascular Nurse Navigator () Advanced Heart Failure Program Inpatient Progress Note:     Chief Complaint: SOB and LE edema     Please note that patient was diagnosed with HF by hospital medicine. Echo is pending, none on file.    Patient has risk factors: HTN, DM (uncontrolled), and DLD. Fortunately he is a never smoker and does not drink ETOH or use recreational drugs.    He does have LE edema as well as elevated troponin T (32) and NT proBNP 1214 upon admission.    If any education is provided to patient and family, please specify that heart failure is a working diagnosis until MD has confirmed diagnosis and has discussed it with the patient.     If after echocardiogram results are available, heart failure is ruled out, respectfully request that:    1. Attending provider clearly state in note that HF is ruled out. If this is not done, patient will still code for heart failure.    2. Remove HF from the problems list so that staff are not confused about necessary interventions and patient does not receive education on a diagnosis he does not have.    If echocardiogram results do not eliminate HF as a diagnosis, please consult cardiology as is expected for all new HF diagnoses and initiate the below measures.    Thank you, Bridgett Cardio RN Navigator [phone]    HF Measures:  1. Documentation of LV systolic function (echo or cath) PTA, during this hospitalization, or plan to assess post discharge or reason for not assessing documented  2. Documentation of fluid intake and urine output every nursing shift  3. 2 hour post diuretic assessment documented 2 hours after diuretic given  4. HF Patient Education using the Living Well With Heart Failure Booklet and Symptom Tracker documented every nursing shift  5. Nutrition consult for diet education  6. Daily weights (one weight documented every 24 hours) on a standing scale unless standing is contraindicated in which case bed scale can be used - have patient  write weight on symptom tracker  7. For LVEF less than or equal to 40%, ACE-I, ARNI or ARB prescribed at discharge   8. For LVEF less than or equal to 40%, an Evidence Based Beta Blocker (bisoprolol, carvedilol, toprol xl) must be prescribed at discharge  9. For LVEF less than or equal to 35% aldosterone blockade prescribed at discharge  10. The combination of hydralazine and isosorbide dinitrate is recommended to reduce morbidity and mortality for patients self-described  Americans with NYHA class III-IV HFrEF (EF 40% or less), receiving optimal therapy with ACE inhibitors and beta blockers, unless contraindicated (Class I, CHOLO: A).  11. If a HF patient is diabetic or is newly diagnosed with DM: prescribed diabetes treatment at discharge in the form of glycemic control (diet or anti-hyperglycemic medication) or f/u appointment for diabetes management scheduled at discharge.  12. If a HF patient has diabetes: prescribed lipid lowering medication at discharge  13. Documented smoking cessation advice or counseling  14. If a HF patient has a-fib: anticoagulation is prescribed upon discharge or contraindication is documented  15. Screening for and administering immunizations as long as no contraindications: Pneumonia (regardless of age) and Influenza  16. Written discharge instructions include:  ? Daily weights  ? Record weight on tracker  ? Bring tracker to appointments  ? Call MD for weight gain of 3lb /day or 5lb/week  ? HF medication teaching  ? Low sodium diet  ? Follow up appointment within seven calendar days of d/c must include: date, time and location  ? Activity  ? Worsening symptoms    What if any of the above HF measures are contraindicated?  ? Request that the discharging provider document the medication/intervention and the contraindication specifically in a progress note  ? For example: “no CHF meds due to hypotension” is not enough. It needs to say: “No ACE-I, ARNI, ARB due to hypotension”; “No  Beta Blockade due to bradycardia”…

## 2020-08-14 ENCOUNTER — PATIENT OUTREACH (OUTPATIENT)
Dept: HEALTH INFORMATION MANAGEMENT | Facility: OTHER | Age: 65
End: 2020-08-14

## 2020-08-14 PROBLEM — I50.21 ACUTE SYSTOLIC CONGESTIVE HEART FAILURE (HCC): Status: ACTIVE | Noted: 2020-08-12

## 2020-08-14 LAB
ANION GAP SERPL CALC-SCNC: 15 MMOL/L (ref 7–16)
BUN SERPL-MCNC: 30 MG/DL (ref 8–22)
CALCIUM SERPL-MCNC: 9.6 MG/DL (ref 8.5–10.5)
CHLORIDE SERPL-SCNC: 95 MMOL/L (ref 96–112)
CO2 SERPL-SCNC: 26 MMOL/L (ref 20–33)
CREAT SERPL-MCNC: 0.98 MG/DL (ref 0.5–1.4)
GLUCOSE BLD-MCNC: 133 MG/DL (ref 65–99)
GLUCOSE BLD-MCNC: 204 MG/DL (ref 65–99)
GLUCOSE BLD-MCNC: 204 MG/DL (ref 65–99)
GLUCOSE BLD-MCNC: 229 MG/DL (ref 65–99)
GLUCOSE BLD-MCNC: 288 MG/DL (ref 65–99)
GLUCOSE SERPL-MCNC: 131 MG/DL (ref 65–99)
LV EJECT FRACT  99904: 25
LV EJECT FRACT MOD 2C 99903: 40.82
LV EJECT FRACT MOD 4C 99902: 32.43
LV EJECT FRACT MOD BP 99901: 38.43
MAGNESIUM SERPL-MCNC: 1.5 MG/DL (ref 1.5–2.5)
PHOSPHATE SERPL-MCNC: 5.1 MG/DL (ref 2.5–4.5)
POTASSIUM SERPL-SCNC: 3.5 MMOL/L (ref 3.6–5.5)
SODIUM SERPL-SCNC: 136 MMOL/L (ref 135–145)

## 2020-08-14 PROCEDURE — 700102 HCHG RX REV CODE 250 W/ 637 OVERRIDE(OP): Performed by: STUDENT IN AN ORGANIZED HEALTH CARE EDUCATION/TRAINING PROGRAM

## 2020-08-14 PROCEDURE — 700111 HCHG RX REV CODE 636 W/ 250 OVERRIDE (IP): Performed by: HOSPITALIST

## 2020-08-14 PROCEDURE — 99232 SBSQ HOSP IP/OBS MODERATE 35: CPT | Performed by: STUDENT IN AN ORGANIZED HEALTH CARE EDUCATION/TRAINING PROGRAM

## 2020-08-14 PROCEDURE — A9270 NON-COVERED ITEM OR SERVICE: HCPCS | Performed by: HOSPITALIST

## 2020-08-14 PROCEDURE — 82962 GLUCOSE BLOOD TEST: CPT

## 2020-08-14 PROCEDURE — A9270 NON-COVERED ITEM OR SERVICE: HCPCS | Performed by: STUDENT IN AN ORGANIZED HEALTH CARE EDUCATION/TRAINING PROGRAM

## 2020-08-14 PROCEDURE — 770020 HCHG ROOM/CARE - TELE (206)

## 2020-08-14 PROCEDURE — 83735 ASSAY OF MAGNESIUM: CPT

## 2020-08-14 PROCEDURE — 700111 HCHG RX REV CODE 636 W/ 250 OVERRIDE (IP): Performed by: STUDENT IN AN ORGANIZED HEALTH CARE EDUCATION/TRAINING PROGRAM

## 2020-08-14 PROCEDURE — 93306 TTE W/DOPPLER COMPLETE: CPT | Mod: 26 | Performed by: INTERNAL MEDICINE

## 2020-08-14 PROCEDURE — 700102 HCHG RX REV CODE 250 W/ 637 OVERRIDE(OP): Performed by: HOSPITALIST

## 2020-08-14 PROCEDURE — 36415 COLL VENOUS BLD VENIPUNCTURE: CPT

## 2020-08-14 PROCEDURE — 80048 BASIC METABOLIC PNL TOTAL CA: CPT

## 2020-08-14 PROCEDURE — 84100 ASSAY OF PHOSPHORUS: CPT

## 2020-08-14 RX ORDER — MAGNESIUM SULFATE HEPTAHYDRATE 40 MG/ML
2 INJECTION, SOLUTION INTRAVENOUS ONCE
Status: COMPLETED | OUTPATIENT
Start: 2020-08-14 | End: 2020-08-14

## 2020-08-14 RX ADMIN — INSULIN HUMAN 3 UNITS: 100 INJECTION, SOLUTION PARENTERAL at 22:09

## 2020-08-14 RX ADMIN — INSULIN HUMAN 15 UNITS: 100 INJECTION, SUSPENSION SUBCUTANEOUS at 08:55

## 2020-08-14 RX ADMIN — MAGNESIUM SULFATE 2 G: 2 INJECTION INTRAVENOUS at 09:01

## 2020-08-14 RX ADMIN — INSULIN HUMAN 5 UNITS: 100 INJECTION, SOLUTION PARENTERAL at 11:04

## 2020-08-14 RX ADMIN — FUROSEMIDE 40 MG: 10 INJECTION, SOLUTION INTRAMUSCULAR; INTRAVENOUS at 17:10

## 2020-08-14 RX ADMIN — ENOXAPARIN SODIUM 40 MG: 40 INJECTION SUBCUTANEOUS at 06:00

## 2020-08-14 RX ADMIN — INSULIN HUMAN 3 UNITS: 100 INJECTION, SOLUTION PARENTERAL at 08:55

## 2020-08-14 RX ADMIN — LOSARTAN POTASSIUM 25 MG: 25 TABLET, FILM COATED ORAL at 17:10

## 2020-08-14 RX ADMIN — FUROSEMIDE 40 MG: 10 INJECTION, SOLUTION INTRAMUSCULAR; INTRAVENOUS at 05:57

## 2020-08-14 RX ADMIN — POTASSIUM CHLORIDE 20 MEQ: 1500 TABLET, EXTENDED RELEASE ORAL at 06:08

## 2020-08-14 RX ADMIN — INSULIN HUMAN 3 UNITS: 100 INJECTION, SOLUTION PARENTERAL at 18:12

## 2020-08-14 RX ADMIN — ASPIRIN 81 MG: 81 TABLET, COATED ORAL at 06:07

## 2020-08-14 RX ADMIN — INSULIN HUMAN 15 UNITS: 100 INJECTION, SUSPENSION SUBCUTANEOUS at 18:13

## 2020-08-14 RX ADMIN — DOCUSATE SODIUM 50 MG AND SENNOSIDES 8.6 MG 2 TABLET: 8.6; 5 TABLET, FILM COATED ORAL at 06:08

## 2020-08-14 RX ADMIN — ATORVASTATIN CALCIUM 40 MG: 40 TABLET, FILM COATED ORAL at 17:10

## 2020-08-14 ASSESSMENT — ENCOUNTER SYMPTOMS
PALPITATIONS: 0
SPUTUM PRODUCTION: 0
CONSTITUTIONAL NEGATIVE: 1
PSYCHIATRIC NEGATIVE: 1
COUGH: 0
EYES NEGATIVE: 1
GASTROINTESTINAL NEGATIVE: 1
MUSCULOSKELETAL NEGATIVE: 1
SHORTNESS OF BREATH: 1
NEUROLOGICAL NEGATIVE: 1
WHEEZING: 0
PND: 0
ORTHOPNEA: 1

## 2020-08-14 ASSESSMENT — FIBROSIS 4 INDEX: FIB4 SCORE: 2.11

## 2020-08-14 NOTE — HEART FAILURE PROGRAM
"Cardiovascular Nurse Navigator () Advanced Heart Failure Program HF New Diagnosis Consult Note:     Patient who presented with SOB and LE edema. Patient has significant CV risk factors: HTN, DM (uncontrolled), and DLD. Fortunately he is a never smoker and does not drink ETOH or use recreational drugs.     He does have LE edema as well as elevated troponin T (32) and NT proBNP 1214 upon admission. No ACS dx at this time.    Echocardiogram revealed an EF of 25%. It also shows moderate LVH, akinesis of mid to distal inferior wall. Known MVR which is funcitoning normally with appropriate transvalvular gradient.    ECG shows SR with a QRSD of 106 ms.     Patient told attending hospitalist that he had a history of \"valvular heart surgery about 4 years ago at [facility]\" but that he's never been on a diuretic outpatient.    I anticipate a cardiology consult. Per facesheet, patient has a local address and does not need to be considered for hydralazine dinitrate. He carries [employer] and Medicare insurances.     · HFrEF (25%)  · NYHA: III  · Stage: not yet addressed  · Etiology: risk factors as above, likely ischemic w/u on this admission  · Diuresis: still on IV furosemide 40 bid  · PHQ-2 score: not addressed    HF + DM specific measures:    • Patient is prescribed diabetes treatment at discharge in the form of glycemic control (diet or anti-hyperglycemic medication) or a f/u appointment for diabetes management is scheduled at discharge - needs to appear on AVS: currently on insulin  • Prescribed lipid lowering medication at discharge: atorva 40      [facility] Plan Notes: none    Therapy Notes: none    GD Secondary Prevention Interventions:    ·   · Influenza vaccine: n/a    Daily Weights: ordered    I's and O's: ordered    HFrEF Specific GDMT: none of the below are currently prescribed, this will need to be addressed prior to discharge except for hydralazine dinitrate.  · NOAM - I  · Evidence based BB (bisoprolol, carvedilol, or " "Toprol XL)  · Aldosterone receptor antagonist   · Consider for hydralazine dinitrate?: n/a    HFrEF Specific Device Therapy Screening Tool not indicated for new diagnosis    Source: [employer]- SCA Prevention Program Screening Tool  2013 ACC/ AHA Heart Failure Guidelines  Rev date: 12/2014    Advanced Care Planning: no AD on file. Full code status at the time of this note's filing.    The ACC recommends engaging palliative care as part of optimization of HF treatment to solicit goals of care and focus on quality of life throughout the clinical course of HF.    Once all diagnostics are in, please consider an order for palliative to discuss Advanced Care Planning.      Speaking with patients frankly about their end-of-life wishes is one of the most important things a palliative care team can do. This is especially important in the context of heart failure, since it’s such an unpredictable disease.    Follow up appointment:   • If discharged from acute care to home (exception hospice discharge), pt must have an appointment scheduled within 7 days of discharge (Cardiology, PCP, or DC Clinic).    • If discharged to Transitional Care Facility (LTAC, SNF, IRH), appointment should be made about a month out for after TCF.     Bedside Nursing Education:  Please provide HF booklet and repeated, ongoing education while administering medications, weighing patient, discussing management of symptoms, diet and need to follow up and act on changes. Please target education to the precipitant of the exacerbation.    Bedside Nursing Discharge:  When completing the after visit summary (discharge instructions) please select \"Cardiac Diagnosis, and Heart Failure\" in the special instructions section to populate the heart failure specific discharge instructions.     Referrals/Orders Placed:    Hospital Schedulers for HF f/u?  yes  Social Work   yes  Registered Dietician  yes  [facility] CP Program for patients with Medicaid, [employer], or " [employer] Plus coverage?  yes  Outpatient Care Coordination for patients with Medicaid?  no    Many thanks, Bridgett, Cardiovascular Nurse Navigator, RN, CHFN [phone], & [employer] M-F (excluding holidays).

## 2020-08-14 NOTE — PROGRESS NOTES
Diabetes education: Met with pt this afternoon. Please see consult note.  Pt states he has all his insulin and supplies at home.  Plan: CDE called diet office to schedule HS snack ( needed as he is on 70/30 or NPH insulin). Pt may benefit in having am dose of 70/30 closer to breakfast depending on when breakfast is delivered. 70/30 needs to be BID meals. Pt would also benefit in having a new Hg A1c ordered as well. CDE to continue to follow blood sugars. All education and discussion given in Frisian.

## 2020-08-14 NOTE — PROGRESS NOTES
Hospital Medicine Daily Progress Note    Date of Service  8/14/2020    Chief Complaint  65 y.o. male admitted 8/12/2020 with 2 weeks history of exertional dyspnea and leg swelling.    Hospital Course    Danish speaking 65 y.o. male with uncontrolled diabetes, HTN, dyslipidemia who presented 8/12/2020 with increasing SOB with exertion over the past two weeks.  He has no fever, no chest pain.  He has had increase leg edema as well.  He states he cannot lay flat on his back w/o increase difficulty in breathing.     He has an elevated troponin and BNP. On exam has leg edema.      Interval Problem Update   services were used in the patient's primary language of English.     Name or Number:  Mode of interpretation: iPad    No acute events overnight.  TTE: Echocardiogram revealed an EF of 25%. It also shows moderate LVH, akinesis of mid to distal inferior wall. Known MVR which is funcitoning normally with appropriate transvalvular gradient   Cardiology has been consulted.    Consultants/Specialty  None    Code Status  Full Code    Disposition  Home    Review of Systems  Review of Systems   Constitutional: Negative.    HENT: Negative.    Eyes: Negative.    Respiratory: Positive for shortness of breath. Negative for cough, sputum production and wheezing.    Cardiovascular: Positive for orthopnea. Negative for chest pain, palpitations, leg swelling and PND.   Gastrointestinal: Negative.    Genitourinary: Negative.    Musculoskeletal: Negative.    Skin: Negative.    Neurological: Negative.    Endo/Heme/Allergies: Negative.    Psychiatric/Behavioral: Negative.         Physical Exam  Temp:  [35.8 °C (96.4 °F)-37 °C (98.6 °F)] 35.9 °C (96.7 °F)  Pulse:  [66-85] 75  Resp:  [18] 18  BP: (127-145)/(77-92) 127/77  SpO2:  [92 %-98 %] 94 %    Physical Exam  Constitutional:       Appearance: Normal appearance.   HENT:      Head: Normocephalic and atraumatic.   Eyes:      Conjunctiva/sclera: Conjunctivae normal.       Pupils: Pupils are equal, round, and reactive to light.   Neck:      Musculoskeletal: Normal range of motion and neck supple.   Cardiovascular:      Rate and Rhythm: Normal rate.      Pulses: Normal pulses.      Heart sounds: Murmur present.   Pulmonary:      Effort: Pulmonary effort is normal. No respiratory distress.      Breath sounds: Rales present. No wheezing.   Abdominal:      General: Bowel sounds are normal.      Palpations: Abdomen is soft.   Musculoskeletal: Normal range of motion.   Skin:     General: Skin is warm.      Comments: Chest-midline scars from prior cardiac surgery.   Neurological:      General: No focal deficit present.      Mental Status: He is alert and oriented to person, place, and time.   Psychiatric:         Mood and Affect: Mood normal.         Behavior: Behavior normal.         Fluids    Intake/Output Summary (Last 24 hours) at 8/14/2020 1241  Last data filed at 8/14/2020 0905  Gross per 24 hour   Intake 480 ml   Output 1250 ml   Net -770 ml       Laboratory  Recent Labs     08/12/20  1335 08/13/20  0335   WBC 8.2 9.1   RBC 4.67* 4.96   HEMOGLOBIN 14.0 14.7   HEMATOCRIT 40.8* 43.3   MCV 87.4 87.3   MCH 30.0 29.6   MCHC 34.3 33.9   RDW 43.1 42.1   PLATELETCT 192 190   MPV 11.0 10.6     Recent Labs     08/12/20  1335 08/13/20  0338 08/14/20  0432   SODIUM 140 137 136   POTASSIUM 4.2 3.4* 3.5*   CHLORIDE 104 97 95*   CO2 23 26 26   GLUCOSE 138* 177* 131*   BUN 19 20 30*   CREATININE 0.90 0.74 0.98   CALCIUM 9.3 9.7 9.6                   Imaging  EC-ECHOCARDIOGRAM COMPLETE W/ CONT   Final Result      DX-CHEST-PORTABLE (1 VIEW)   Final Result      1.  There are ill-defined bilateral interstitial opacities with a trace of left pleural fluid. Findings are most consistent with edema. Pneumonitis is considered possible but less likely.           Assessment/Plan  Essential hypertension  Assessment & Plan  Monitor vitals  Continue with active treatment with losartan 25 mg with  parameters.  Diuresis with Lasix        Acute systolic congestive heart failure (HCC)  Assessment & Plan  Increasing shortness of breath with exertion.  Elevated BNP and leg edema concerning for heart failure.  Patient has a history of valvular heart surgery about 4 years ago with [facility]. Has never been on diuretic outpatient.  TTE:   Echocardiogram revealed an EF of 25%. It also shows moderate LVH, akinesis of mid to distal inferior wall. Known MVR which is funcitoning normally with appropriate transvalvular gradient  Cardiology has been consulted.  Diuresis with IV Lasix 40 milligrams IV twice daily.  Monitor electrolytes.  Monitor kidney function.  Strict I's and O's /daily weights.  Continue losartan, Hold off on beta-blocker until acute component of CHF is improved.  May need to get records from [facility].  Wean oxygen as tolerated.    Elevated troponin  Assessment & Plan  No concern for ACS.  Monitor on telemetry  Aspirin and statin check fasting lipids      Elevated liver enzymes  Assessment & Plan  Question of secondary to hepatic congestion  elevated BNP and checking echocardiogram  Diuresis  Monitor CMP  Obese with fatty liver being possible component       Type 2 diabetes mellitus, with long-term current use of insulin ([facility])  Assessment & Plan  Grossly uncontrolled diabetic with a A1c of 17.2 in the past 3 months  Monitor Accu-Cheks continue with his outpatient Humulin.  Diabetic diet  He needs ongoing diabetic education            VTE prophylaxis: Lovenox

## 2020-08-14 NOTE — PROGRESS NOTES
Report received from day shift RN, assumed patient care. Patient is A&O x 4, on 2.5 L NC O2. Tele box on and in place. Patient denies any pain at this time. Patient updated on plan of care and verbalizes no questions. Patient has call light within reach, fall precautions in place. Patient educated to call for assistance as needed. Will continue to monitor.

## 2020-08-14 NOTE — CONSULTS
Diabetes education: Met with pt this afternoon. Pt is known from previous visit. Pt has a hx of diabetes on insulin (70/30) and Metformin. Pt here for CHF. Blood sugar on admit was 138. Pt has an Hg a1c from 5/7/20 ( before he started insulin) and would benefit from having a new A1c ordered.   Pt is currently on 70/30 15 units BID ( 06 and 6 pm) and regular insulin sliding scale coverage ac and hs. Blood sugars are 275 (5 units) and 254 ( 5 units). Pt did not get any 70/30 insulin pm 8/12 or am 8/13.   Pt states he has all his insulin and supplies at home.  Plan: DOROTHYE called diet office to schedule HS snack ( needed as he is on 70/30 or NPH insulin). Pt may benefit in having am dose of 70/30 closer to breakfast depending on when breakfast is delivered. 70/30 needs to be BID meals. Pt would also benefit in having a new Hg A1c ordered as well. CDE to continue to follow blood sugars. All education and discussion given in Swazi.      Previous visit :    Jessica Howell R.N.   Diabetes Health Educator      Consults   Signed   Date of Service:  5/7/2020  9:23 PM                     Diabetes education: Met with pt this afternoon before discharge. Pt has a hx of diabetes who had been on insulin about one year ago, was changed to oral agents and ran out of medication for two months because of insurance issues. Pt is now covered by [facility] and is covered to attend their wellness clinic [facility] ( [phone]). It was the clinic at the [place], the pt attended and was sent to [facility]. Pt has an appointment with them next week. [facility] called and spoke with both MA and Nurse. He can get all his oral agents, and testing supplies through them without cost. They do not have insulin or syringes.  Originally pt was listed without insurance and would need assistance for medications. He then had [facility] listed and will  his  70/30 insulin and syringes at [facility]. [facility] has spoken and tiger text resident with  this information.  Briefly reviewed diabetes, hyper and hypoglycemia, Metformin, insulin ( 70/30), how to draw up and give ( he has given previously) and handouts given. He has done finger sticks before and will get supplies at the [facility] but needed to be discharged in time to get there  Before 7 pm.   All education and handouts given in Haitian.

## 2020-08-14 NOTE — CARE PLAN
Problem: Communication  Goal: The ability to communicate needs accurately and effectively will improve  Outcome: PROGRESSING AS EXPECTED   Note: Patient educated to call for assistance with needs. Patient has call light within reach, fall precautions in place. Will continue to monitor.     Problem: Knowledge Deficit  Goal: Knowledge of disease process/condition, treatment plan, diagnostic tests, and medications will improve  Outcome: PROGRESSING AS EXPECTED   Note: Patient updated on plan of care via  and vocalized understanding. Will continue to update.

## 2020-08-14 NOTE — PROGRESS NOTES
Monitor Summary   SR 72-78  First Degree Heart Block   (O) PAC  (R) PVC   Couplet  Bigem   .24/.08/.48

## 2020-08-15 LAB
ANION GAP SERPL CALC-SCNC: 13 MMOL/L (ref 7–16)
BUN SERPL-MCNC: 41 MG/DL (ref 8–22)
CALCIUM SERPL-MCNC: 9.8 MG/DL (ref 8.5–10.5)
CHLORIDE SERPL-SCNC: 92 MMOL/L (ref 96–112)
CO2 SERPL-SCNC: 29 MMOL/L (ref 20–33)
CREAT SERPL-MCNC: 1.22 MG/DL (ref 0.5–1.4)
EST. AVERAGE GLUCOSE BLD GHB EST-MCNC: 243 MG/DL
GLUCOSE BLD-MCNC: 135 MG/DL (ref 65–99)
GLUCOSE BLD-MCNC: 155 MG/DL (ref 65–99)
GLUCOSE BLD-MCNC: 156 MG/DL (ref 65–99)
GLUCOSE BLD-MCNC: 156 MG/DL (ref 65–99)
GLUCOSE BLD-MCNC: 189 MG/DL (ref 65–99)
GLUCOSE SERPL-MCNC: 113 MG/DL (ref 65–99)
HBA1C MFR BLD: 10.1 % (ref 0–5.6)
MAGNESIUM SERPL-MCNC: 1.9 MG/DL (ref 1.5–2.5)
PHOSPHATE SERPL-MCNC: 5.7 MG/DL (ref 2.5–4.5)
POTASSIUM SERPL-SCNC: 3.6 MMOL/L (ref 3.6–5.5)
SODIUM SERPL-SCNC: 134 MMOL/L (ref 135–145)

## 2020-08-15 PROCEDURE — 80048 BASIC METABOLIC PNL TOTAL CA: CPT

## 2020-08-15 PROCEDURE — 700102 HCHG RX REV CODE 250 W/ 637 OVERRIDE(OP): Performed by: HOSPITALIST

## 2020-08-15 PROCEDURE — 700102 HCHG RX REV CODE 250 W/ 637 OVERRIDE(OP): Performed by: STUDENT IN AN ORGANIZED HEALTH CARE EDUCATION/TRAINING PROGRAM

## 2020-08-15 PROCEDURE — 83036 HEMOGLOBIN GLYCOSYLATED A1C: CPT

## 2020-08-15 PROCEDURE — A9270 NON-COVERED ITEM OR SERVICE: HCPCS | Performed by: STUDENT IN AN ORGANIZED HEALTH CARE EDUCATION/TRAINING PROGRAM

## 2020-08-15 PROCEDURE — 700102 HCHG RX REV CODE 250 W/ 637 OVERRIDE(OP): Performed by: INTERNAL MEDICINE

## 2020-08-15 PROCEDURE — 84100 ASSAY OF PHOSPHORUS: CPT

## 2020-08-15 PROCEDURE — 700111 HCHG RX REV CODE 636 W/ 250 OVERRIDE (IP): Performed by: HOSPITALIST

## 2020-08-15 PROCEDURE — 82962 GLUCOSE BLOOD TEST: CPT | Mod: 91

## 2020-08-15 PROCEDURE — A9270 NON-COVERED ITEM OR SERVICE: HCPCS | Performed by: HOSPITALIST

## 2020-08-15 PROCEDURE — 99232 SBSQ HOSP IP/OBS MODERATE 35: CPT | Performed by: STUDENT IN AN ORGANIZED HEALTH CARE EDUCATION/TRAINING PROGRAM

## 2020-08-15 PROCEDURE — A9270 NON-COVERED ITEM OR SERVICE: HCPCS | Performed by: INTERNAL MEDICINE

## 2020-08-15 PROCEDURE — 83735 ASSAY OF MAGNESIUM: CPT

## 2020-08-15 PROCEDURE — 770020 HCHG ROOM/CARE - TELE (206)

## 2020-08-15 PROCEDURE — 99255 IP/OBS CONSLTJ NEW/EST HI 80: CPT | Performed by: INTERNAL MEDICINE

## 2020-08-15 PROCEDURE — 36415 COLL VENOUS BLD VENIPUNCTURE: CPT

## 2020-08-15 RX ORDER — FUROSEMIDE 40 MG/1
40 TABLET ORAL
Status: DISCONTINUED | OUTPATIENT
Start: 2020-08-16 | End: 2020-08-17

## 2020-08-15 RX ADMIN — INSULIN HUMAN 2 UNITS: 100 INJECTION, SOLUTION PARENTERAL at 08:50

## 2020-08-15 RX ADMIN — POTASSIUM CHLORIDE 20 MEQ: 1500 TABLET, EXTENDED RELEASE ORAL at 06:06

## 2020-08-15 RX ADMIN — DOCUSATE SODIUM 50 MG AND SENNOSIDES 8.6 MG 2 TABLET: 8.6; 5 TABLET, FILM COATED ORAL at 06:05

## 2020-08-15 RX ADMIN — ENOXAPARIN SODIUM 40 MG: 40 INJECTION SUBCUTANEOUS at 06:06

## 2020-08-15 RX ADMIN — FUROSEMIDE 40 MG: 10 INJECTION, SOLUTION INTRAMUSCULAR; INTRAVENOUS at 06:07

## 2020-08-15 RX ADMIN — INSULIN HUMAN 2 UNITS: 100 INJECTION, SOLUTION PARENTERAL at 22:27

## 2020-08-15 RX ADMIN — INSULIN HUMAN 2 UNITS: 100 INJECTION, SOLUTION PARENTERAL at 18:15

## 2020-08-15 RX ADMIN — LOSARTAN POTASSIUM 25 MG: 25 TABLET, FILM COATED ORAL at 18:14

## 2020-08-15 RX ADMIN — INSULIN HUMAN 15 UNITS: 100 INJECTION, SUSPENSION SUBCUTANEOUS at 18:15

## 2020-08-15 RX ADMIN — INSULIN HUMAN 15 UNITS: 100 INJECTION, SUSPENSION SUBCUTANEOUS at 08:50

## 2020-08-15 RX ADMIN — METOPROLOL TARTRATE 25 MG: 25 TABLET, FILM COATED ORAL at 18:14

## 2020-08-15 RX ADMIN — INSULIN HUMAN 2 UNITS: 100 INJECTION, SOLUTION PARENTERAL at 13:06

## 2020-08-15 RX ADMIN — ATORVASTATIN CALCIUM 40 MG: 40 TABLET, FILM COATED ORAL at 18:14

## 2020-08-15 RX ADMIN — ASPIRIN 81 MG: 81 TABLET, COATED ORAL at 06:06

## 2020-08-15 ASSESSMENT — ENCOUNTER SYMPTOMS
EYES NEGATIVE: 1
NEUROLOGICAL NEGATIVE: 1
LOSS OF CONSCIOUSNESS: 0
CONSTITUTIONAL NEGATIVE: 1
ORTHOPNEA: 1
PND: 1
WHEEZING: 0
MUSCULOSKELETAL NEGATIVE: 1
SPUTUM PRODUCTION: 0
PSYCHIATRIC NEGATIVE: 1
SHORTNESS OF BREATH: 1
DEPRESSION: 0
FALLS: 0
GASTROINTESTINAL NEGATIVE: 1
PALPITATIONS: 0
COUGH: 0
PND: 0
ABDOMINAL PAIN: 0
DIZZINESS: 0

## 2020-08-15 ASSESSMENT — FIBROSIS 4 INDEX: FIB4 SCORE: 2.11

## 2020-08-15 NOTE — CONSULTS
Cardiology Initial Consultation    Date of Service  8/15/2020    Referring Physician  Pranay Interiano D.O.    Reason for Consultation  Cardiomyopathy    History of Presenting Illness  Chino Deluca is a 65 y.o. male who presented 8/12/2020 with symptoms of fluid overload.  Patient reports being in his usual good health about 2 weeks prior to presentation when he started having dyspnea on exertion along with orthopnea and PND.  He gained about 5 to 6 pounds in that duration.  He also reports having lower extremity edema.  He denies any associated chest discomfort or palpitations.  He denies any prior history of similar symptoms.  Since being admitted, his symptoms have significantly improved.    Patient is status post mitral valve replacement at [facility], 40 years ago      Review of Systems  Review of Systems   Constitutional: Negative for malaise/fatigue.   Respiratory: Positive for shortness of breath.    Cardiovascular: Positive for orthopnea, leg swelling and PND. Negative for chest pain and palpitations.   Gastrointestinal: Negative for abdominal pain.   Musculoskeletal: Negative for falls.   Neurological: Negative for dizziness and loss of consciousness.   Psychiatric/Behavioral: Negative for depression.   All other systems reviewed and are negative.      Past Medical History   has a past medical history of Cataract, Diabetes (HCC), Full dentures, Glaucoma, Heart valve disease, High cholesterol, Hypertension, Pneumonia, and Snoring.    Surgical History   has a past surgical history that includes other; other; other cardiac surgery; and vitrectomy posterior (Left, 6/9/2020).    Family History  No cardiac history in the family.    Social History   reports that he has never smoked. He has never used smokeless tobacco. He reports that he does not drink alcohol or use drugs.    Home Medications   Prior to Admission Medications   Prescriptions Last Dose Informant Patient Reported? Taking?    atorvastatin (LIPITOR) 40 MG Tab 8/11/2020 at PM Friend No No   Sig: Take 1 Tab by mouth every evening.   insulin 70/30 (HUMULIN/NOVOLIN) (70-30) 100 UNIT/ML Suspension 8/11/2020 at PM Friend No No   Sig: Inject 15 Units as instructed 2 Times a Day.   losartan (COZAAR) 25 MG Tab 8/11/2020 at PM Friend Yes No   Sig: Take 25 mg by mouth every evening.   metFORMIN (GLUCOPHAGE) 500 MG Tab 8/11/2020 at PM Friend Yes No   Sig: Take 500 mg by mouth 2 times a day.      Facility-Administered Medications: None       Inpatient Medications  • aspirin EC  81 mg DAILY   • atorvastatin  40 mg Q EVENING   • insulin 70/30  15 Units BID   • losartan  25 mg Q EVENING   • senna-docusate  2 Tab BID    And   • polyethylene glycol/lytes  1 Packet QDAY PRN    And   • magnesium hydroxide  30 mL QDAY PRN    And   • bisacodyl  10 mg QDAY PRN   • enoxaparin  40 mg DAILY   • acetaminophen  650 mg Q6HRS PRN   • enalaprilat  1.25 mg Q6HRS PRN   • ondansetron  4 mg Q4HRS PRN   • ondansetron  4 mg Q4HRS PRN   • insulin regular  2-9 Units 4X/DAY ACHS    And   • glucose  16 g Q15 MIN PRN    And   • dextrose 50%  50 mL Q15 MIN PRN   • potassium chloride SA  20 mEq DAILY   • furosemide  40 mg BID DIURETIC       Allergies  No Known Allergies    Vital signs in last 24 hours  Temp:  [35.9 °C (96.7 °F)-36.9 °C (98.4 °F)] 36.2 °C (97.2 °F)  Pulse:  [74-82] 82  Resp:  [18] 18  BP: (119-135)/(65-85) 130/85  SpO2:  [94 %-97 %] 95 %    Physical Exam  Physical Exam   Constitutional: He is oriented to person, place, and time and well-developed, well-nourished, and in no distress. No distress.   HENT:   Head: Normocephalic and atraumatic.   Eyes: Conjunctivae are normal. No scleral icterus.   Neck: Normal range of motion. Neck supple. No JVD present.   Cardiovascular: Normal rate, regular rhythm and intact distal pulses. Exam reveals no gallop and no friction rub.   No murmur heard.  Pulmonary/Chest: Effort normal and breath sounds normal. No respiratory  distress. He has no wheezes. He has no rales. He exhibits no tenderness.   Abdominal: Soft. Bowel sounds are normal. He exhibits no distension. There is no abdominal tenderness.   Musculoskeletal: Normal range of motion.         General: No edema.   Neurological: He is alert and oriented to person, place, and time.   Skin: Skin is warm and dry. No rash noted. He is not diaphoretic.   Psychiatric: Mood, affect and judgment normal.   Nursing note and vitals reviewed.      Lab Review  Recent Labs     08/12/20  1335 08/13/20  0335   WBC 8.2 9.1   RBC 4.67* 4.96   HEMOGLOBIN 14.0 14.7   HEMATOCRIT 40.8* 43.3   PLATELETCT 192 190   MCV 87.4 87.3   MPV 11.0 10.6     Recent Labs     08/13/20  0338 08/14/20  0432 08/15/20  0355   SODIUM 137 136 134*   POTASSIUM 3.4* 3.5* 3.6   CHLORIDE 97 95* 92*   CO2 26 26 29   GLUCOSE 177* 131* 113*   BUN 20 30* 41*   CREATININE 0.74 0.98 1.22      Lab Results   Component Value Date/Time    TROPONINI <0.01 01/16/2016 09:46 PM    TROPONINT 36 (H) 08/13/2020 12:29 PM       Lab Results   Component Value Date/Time    ASTSGOT 44 08/12/2020 01:35 PM    ALTSGPT 51 (H) 08/12/2020 01:35 PM     Lab Results   Component Value Date/Time    CHOLSTRLTOT 183 05/07/2020 03:09 AM    LDL see below 05/07/2020 03:09 AM    HDL 31 (A) 05/07/2020 03:09 AM    TRIGLYCERIDE 587 (H) 05/07/2020 03:09 AM         Labs reviewed as noted above.  Normal hemoglobin and creatinine.    Cardiac Imaging and Procedures Review  EKG performed 8/12/2020 11:30 AM was personally reviewed and per my interpretation shows sinus rhythm in the 80s.  First-degree AV block.  Left anterior fascicular block, poor R wave progression.  T wave inversions in the anterolateral leads.    Echocardiogram performed 8/13/2020 was personally reviewed and per my interpretation shows severely reduced LV systolic function with an ejection fraction of about 25 to 30%.  Status post mitral valve replacement, mean gradient 4 mmHg at 75 bpm.  Trace mitral  regurgitation.      Assessment/Plan    Cardiomyopathy:  Elevated troponin:  Status post mitral valve replacement:    Patient appears close to euvolemic.  He will need an ischemic work-up for his new onset cardiomyopathy.  He will be kept n.p.o. after midnight for possible procedure tomorrow depending on Cath Lab availability.  No history of drug or alcohol use.  Normal TSH.  His bioprosthetic mitral valve appears to be functioning normally.  His elevated troponin is likely secondary to demand ischemia in the setting of fluid overload.    Transition to oral Lasix starting tomorrow.  Start metoprolol 25 mg twice daily.  Continue losartan at current dose.  Continue aspirin and statin.      Thank you for allowing me to participate in the care of this patient. Please do not hesitate to contact me with any questions.    Barbara Raymundo MD, [facility]  Cardiologist  [facility]

## 2020-08-15 NOTE — CARE PLAN
Problem: Communication  Goal: The ability to communicate needs accurately and effectively will improve  Outcome: PROGRESSING AS EXPECTED   Note: Patient educated to call for assistance with needs. Patient verbalized understanding. Call light within reach. Fall precautions in place. Will continue to monitor.     Problem: Knowledge Deficit  Goal: Knowledge of disease process/condition, treatment plan, diagnostic tests, and medications will improve  Outcome: PROGRESSING AS EXPECTED   Note: Patient updated on plan of care and verbalized understanding. Patient has questions answered at this time. Will continue to update.

## 2020-08-15 NOTE — PROGRESS NOTES
Hospital Medicine Daily Progress Note    Date of Service  8/15/2020    Chief Complaint  65 y.o. male admitted 8/12/2020 with 2 weeks history of exertional dyspnea and leg swelling.    Hospital Course    Chinese speaking 65 y.o. male with uncontrolled diabetes, HTN, dyslipidemia who presented 8/12/2020 with increasing SOB with exertion over the past two weeks.  He has no fever, no chest pain.  He has had increase leg edema as well.  He states he cannot lay flat on his back w/o increase difficulty in breathing.     He has an elevated troponin and BNP. On exam has leg edema.      Interval Problem Update   services were used in the patient's primary language of English.     Name or Number:  Mode of interpretation: iPad    No acute events overnight.  Awaiting cardiology consultation. Continue patient on IV diuresis with Lasix.    Consultants/Specialty  Cardiology    Code Status  Full Code    Disposition  Home    Review of Systems  Review of Systems   Constitutional: Negative.    HENT: Negative.    Eyes: Negative.    Respiratory: Positive for shortness of breath. Negative for cough, sputum production and wheezing.    Cardiovascular: Positive for orthopnea. Negative for chest pain, palpitations, leg swelling and PND.   Gastrointestinal: Negative.    Genitourinary: Negative.    Musculoskeletal: Negative.    Skin: Negative.    Neurological: Negative.    Endo/Heme/Allergies: Negative.    Psychiatric/Behavioral: Negative.         Physical Exam  Temp:  [35.9 °C (96.7 °F)-36.9 °C (98.4 °F)] 36.2 °C (97.2 °F)  Pulse:  [74-82] 82  Resp:  [18] 18  BP: (119-135)/(65-85) 130/85  SpO2:  [94 %-97 %] 95 %    Physical Exam  Constitutional:       Appearance: Normal appearance.   HENT:      Head: Normocephalic and atraumatic.   Eyes:      Conjunctiva/sclera: Conjunctivae normal.      Pupils: Pupils are equal, round, and reactive to light.   Neck:      Musculoskeletal: Normal range of motion and neck supple.    Cardiovascular:      Rate and Rhythm: Normal rate.      Pulses: Normal pulses.      Heart sounds: Murmur present.   Pulmonary:      Effort: Pulmonary effort is normal. No respiratory distress.      Breath sounds: Rales present. No wheezing.   Abdominal:      General: Bowel sounds are normal.      Palpations: Abdomen is soft.   Musculoskeletal: Normal range of motion.   Skin:     General: Skin is warm.      Comments: Chest-midline scars from prior cardiac surgery.   Neurological:      General: No focal deficit present.      Mental Status: He is alert and oriented to person, place, and time.   Psychiatric:         Mood and Affect: Mood normal.         Behavior: Behavior normal.         Fluids    Intake/Output Summary (Last 24 hours) at 8/15/2020 0958  Last data filed at 8/15/2020 0344  Gross per 24 hour   Intake 240 ml   Output 1400 ml   Net -1160 ml       Laboratory  Recent Labs     08/12/20  1335 08/13/20  0335   WBC 8.2 9.1   RBC 4.67* 4.96   HEMOGLOBIN 14.0 14.7   HEMATOCRIT 40.8* 43.3   MCV 87.4 87.3   MCH 30.0 29.6   MCHC 34.3 33.9   RDW 43.1 42.1   PLATELETCT 192 190   MPV 11.0 10.6     Recent Labs     08/13/20  0338 08/14/20  0432 08/15/20  0355   SODIUM 137 136 134*   POTASSIUM 3.4* 3.5* 3.6   CHLORIDE 97 95* 92*   CO2 26 26 29   GLUCOSE 177* 131* 113*   BUN 20 30* 41*   CREATININE 0.74 0.98 1.22   CALCIUM 9.7 9.6 9.8                   Imaging  EC-ECHOCARDIOGRAM COMPLETE W/ CONT   Final Result      DX-CHEST-PORTABLE (1 VIEW)   Final Result      1.  There are ill-defined bilateral interstitial opacities with a trace of left pleural fluid. Findings are most consistent with edema. Pneumonitis is considered possible but less likely.           Assessment/Plan  Essential hypertension  Assessment & Plan  Monitor vitals  Continue with active treatment with losartan 25 mg with parameters.  Diuresis with Lasix        Acute systolic congestive heart failure (HCC)  Assessment & Plan  Increasing shortness of breath with  exertion.  Elevated BNP and leg edema concerning for heart failure.  Patient has a history of valvular heart surgery about 4 years ago with [facility]. Has never been on diuretic outpatient.  TTE:   Echocardiogram revealed an EF of 25%. It also shows moderate LVH, akinesis of mid to distal inferior wall. Known MVR which is funcitoning normally with appropriate transvalvular gradient  Cardiology has been consulted.  Diuresis with IV Lasix 40 milligrams IV twice daily.  Monitor electrolytes.  Monitor kidney function.  Strict I's and O's /daily weights.  Continue losartan, Hold off on beta-blocker until acute component of CHF is improved.  May need to get records from [facility].  Wean oxygen as tolerated.    Elevated troponin  Assessment & Plan  No concern for ACS.  Monitor on telemetry  Aspirin and statin    Elevated liver enzymes  Assessment & Plan  Question of secondary to hepatic congestion  elevated BNP and checking echocardiogram  Diuresis  Monitor CMP  Obese with fatty liver being possible component       Type 2 diabetes mellitus, with long-term current use of insulin (HCC)  Assessment & Plan  Grossly uncontrolled diabetic with a A1c of 17.2 in the past 3 months  Monitor Accu-Cheks continue with his outpatient Humulin.  Diabetic diet  He needs ongoing diabetic education            VTE prophylaxis: Lovenox

## 2020-08-15 NOTE — PROGRESS NOTES
Report received from day shift RN, assumed patient care. Patient is A&O x 4, on 2 L O2 NC. Tele box on and in place. Patient denies any pain at this time. Patient updated on plan of care and verbalizes no questions. Patient has call light within reach, fall precautions in place. Patient educated to call for assistance as needed. Will continue to monitor.

## 2020-08-16 ENCOUNTER — APPOINTMENT (OUTPATIENT)
Dept: CARDIOLOGY | Facility: MEDICAL CENTER | Age: 65
DRG: 247 | End: 2020-08-16
Attending: NURSE PRACTITIONER
Payer: COMMERCIAL

## 2020-08-16 LAB
ANION GAP SERPL CALC-SCNC: 14 MMOL/L (ref 7–16)
BUN SERPL-MCNC: 51 MG/DL (ref 8–22)
CALCIUM SERPL-MCNC: 9.3 MG/DL (ref 8.5–10.5)
CHLORIDE SERPL-SCNC: 91 MMOL/L (ref 96–112)
CO2 SERPL-SCNC: 25 MMOL/L (ref 20–33)
CREAT SERPL-MCNC: 1.24 MG/DL (ref 0.5–1.4)
GLUCOSE BLD-MCNC: 123 MG/DL (ref 65–99)
GLUCOSE BLD-MCNC: 201 MG/DL (ref 65–99)
GLUCOSE BLD-MCNC: 223 MG/DL (ref 65–99)
GLUCOSE BLD-MCNC: 97 MG/DL (ref 65–99)
GLUCOSE SERPL-MCNC: 90 MG/DL (ref 65–99)
MAGNESIUM SERPL-MCNC: 1.9 MG/DL (ref 1.5–2.5)
PHOSPHATE SERPL-MCNC: 5.2 MG/DL (ref 2.5–4.5)
POTASSIUM SERPL-SCNC: 3.5 MMOL/L (ref 3.6–5.5)
SODIUM SERPL-SCNC: 130 MMOL/L (ref 135–145)

## 2020-08-16 PROCEDURE — A9270 NON-COVERED ITEM OR SERVICE: HCPCS | Performed by: INTERNAL MEDICINE

## 2020-08-16 PROCEDURE — 80048 BASIC METABOLIC PNL TOTAL CA: CPT

## 2020-08-16 PROCEDURE — 99153 MOD SED SAME PHYS/QHP EA: CPT

## 2020-08-16 PROCEDURE — 700102 HCHG RX REV CODE 250 W/ 637 OVERRIDE(OP): Performed by: INTERNAL MEDICINE

## 2020-08-16 PROCEDURE — 700102 HCHG RX REV CODE 250 W/ 637 OVERRIDE(OP): Performed by: STUDENT IN AN ORGANIZED HEALTH CARE EDUCATION/TRAINING PROGRAM

## 2020-08-16 PROCEDURE — 700102 HCHG RX REV CODE 250 W/ 637 OVERRIDE(OP): Performed by: HOSPITALIST

## 2020-08-16 PROCEDURE — 4A023N7 MEASUREMENT OF CARDIAC SAMPLING AND PRESSURE, LEFT HEART, PERCUTANEOUS APPROACH: ICD-10-PCS | Performed by: INTERNAL MEDICINE

## 2020-08-16 PROCEDURE — 94760 N-INVAS EAR/PLS OXIMETRY 1: CPT

## 2020-08-16 PROCEDURE — 99232 SBSQ HOSP IP/OBS MODERATE 35: CPT | Performed by: STUDENT IN AN ORGANIZED HEALTH CARE EDUCATION/TRAINING PROGRAM

## 2020-08-16 PROCEDURE — 99255 IP/OBS CONSLTJ NEW/EST HI 80: CPT | Performed by: THORACIC SURGERY (CARDIOTHORACIC VASCULAR SURGERY)

## 2020-08-16 PROCEDURE — B2151ZZ FLUOROSCOPY OF LEFT HEART USING LOW OSMOLAR CONTRAST: ICD-10-PCS | Performed by: INTERNAL MEDICINE

## 2020-08-16 PROCEDURE — B2111ZZ FLUOROSCOPY OF MULTIPLE CORONARY ARTERIES USING LOW OSMOLAR CONTRAST: ICD-10-PCS | Performed by: INTERNAL MEDICINE

## 2020-08-16 PROCEDURE — 83735 ASSAY OF MAGNESIUM: CPT

## 2020-08-16 PROCEDURE — 700111 HCHG RX REV CODE 636 W/ 250 OVERRIDE (IP)

## 2020-08-16 PROCEDURE — 700101 HCHG RX REV CODE 250

## 2020-08-16 PROCEDURE — 99152 MOD SED SAME PHYS/QHP 5/>YRS: CPT | Performed by: INTERNAL MEDICINE

## 2020-08-16 PROCEDURE — 700117 HCHG RX CONTRAST REV CODE 255: Performed by: INTERNAL MEDICINE

## 2020-08-16 PROCEDURE — 770020 HCHG ROOM/CARE - TELE (206)

## 2020-08-16 PROCEDURE — 84100 ASSAY OF PHOSPHORUS: CPT

## 2020-08-16 PROCEDURE — 82962 GLUCOSE BLOOD TEST: CPT | Mod: 91

## 2020-08-16 PROCEDURE — 700111 HCHG RX REV CODE 636 W/ 250 OVERRIDE (IP): Performed by: HOSPITALIST

## 2020-08-16 PROCEDURE — A9270 NON-COVERED ITEM OR SERVICE: HCPCS | Performed by: HOSPITALIST

## 2020-08-16 PROCEDURE — A9270 NON-COVERED ITEM OR SERVICE: HCPCS | Performed by: STUDENT IN AN ORGANIZED HEALTH CARE EDUCATION/TRAINING PROGRAM

## 2020-08-16 PROCEDURE — 93458 L HRT ARTERY/VENTRICLE ANGIO: CPT | Mod: 26 | Performed by: INTERNAL MEDICINE

## 2020-08-16 PROCEDURE — 36415 COLL VENOUS BLD VENIPUNCTURE: CPT

## 2020-08-16 RX ORDER — MIDAZOLAM HYDROCHLORIDE 1 MG/ML
INJECTION INTRAMUSCULAR; INTRAVENOUS
Status: COMPLETED
Start: 2020-08-16 | End: 2020-08-16

## 2020-08-16 RX ORDER — HEPARIN SODIUM 200 [USP'U]/100ML
INJECTION, SOLUTION INTRAVENOUS
Status: COMPLETED
Start: 2020-08-16 | End: 2020-08-16

## 2020-08-16 RX ORDER — HEPARIN SODIUM,PORCINE 1000/ML
VIAL (ML) INJECTION
Status: COMPLETED
Start: 2020-08-16 | End: 2020-08-16

## 2020-08-16 RX ORDER — VERAPAMIL HYDROCHLORIDE 2.5 MG/ML
INJECTION, SOLUTION INTRAVENOUS
Status: COMPLETED
Start: 2020-08-16 | End: 2020-08-16

## 2020-08-16 RX ORDER — LIDOCAINE HYDROCHLORIDE 20 MG/ML
INJECTION, SOLUTION INFILTRATION; PERINEURAL
Status: COMPLETED
Start: 2020-08-16 | End: 2020-08-16

## 2020-08-16 RX ADMIN — METOPROLOL TARTRATE 25 MG: 25 TABLET, FILM COATED ORAL at 05:45

## 2020-08-16 RX ADMIN — INSULIN HUMAN 15 UNITS: 100 INJECTION, SUSPENSION SUBCUTANEOUS at 17:12

## 2020-08-16 RX ADMIN — POTASSIUM CHLORIDE 20 MEQ: 1500 TABLET, EXTENDED RELEASE ORAL at 05:45

## 2020-08-16 RX ADMIN — HEPARIN SODIUM 2000 UNITS: 200 INJECTION, SOLUTION INTRAVENOUS at 12:13

## 2020-08-16 RX ADMIN — INSULIN HUMAN 3 UNITS: 100 INJECTION, SOLUTION PARENTERAL at 17:12

## 2020-08-16 RX ADMIN — ASPIRIN 81 MG: 81 TABLET, COATED ORAL at 05:45

## 2020-08-16 RX ADMIN — LIDOCAINE HYDROCHLORIDE: 20 INJECTION, SOLUTION INFILTRATION; PERINEURAL at 12:17

## 2020-08-16 RX ADMIN — FENTANYL CITRATE 50 MCG: 50 INJECTION INTRAMUSCULAR; INTRAVENOUS at 12:44

## 2020-08-16 RX ADMIN — NITROGLYCERIN 10 ML: 20 INJECTION INTRAVENOUS at 12:19

## 2020-08-16 RX ADMIN — INSULIN HUMAN 3 UNITS: 100 INJECTION, SOLUTION PARENTERAL at 21:46

## 2020-08-16 RX ADMIN — MIDAZOLAM HYDROCHLORIDE 1 MG: 1 INJECTION, SOLUTION INTRAMUSCULAR; INTRAVENOUS at 12:44

## 2020-08-16 RX ADMIN — DOCUSATE SODIUM 50 MG AND SENNOSIDES 8.6 MG 2 TABLET: 8.6; 5 TABLET, FILM COATED ORAL at 05:40

## 2020-08-16 RX ADMIN — ENOXAPARIN SODIUM 40 MG: 40 INJECTION SUBCUTANEOUS at 05:45

## 2020-08-16 RX ADMIN — FUROSEMIDE 40 MG: 40 TABLET ORAL at 08:09

## 2020-08-16 RX ADMIN — IOHEXOL 95 ML: 350 INJECTION, SOLUTION INTRAVENOUS at 12:44

## 2020-08-16 RX ADMIN — VERAPAMIL HYDROCHLORIDE 2.5 MG: 2.5 INJECTION, SOLUTION INTRAVENOUS at 12:17

## 2020-08-16 RX ADMIN — DOCUSATE SODIUM 50 MG AND SENNOSIDES 8.6 MG 2 TABLET: 8.6; 5 TABLET, FILM COATED ORAL at 17:11

## 2020-08-16 RX ADMIN — LOSARTAN POTASSIUM 25 MG: 25 TABLET, FILM COATED ORAL at 17:11

## 2020-08-16 RX ADMIN — HEPARIN SODIUM: 1000 INJECTION, SOLUTION INTRAVENOUS; SUBCUTANEOUS at 12:17

## 2020-08-16 RX ADMIN — ATORVASTATIN CALCIUM 40 MG: 40 TABLET, FILM COATED ORAL at 17:11

## 2020-08-16 RX ADMIN — METOPROLOL TARTRATE 25 MG: 25 TABLET, FILM COATED ORAL at 17:11

## 2020-08-16 ASSESSMENT — ENCOUNTER SYMPTOMS
GASTROINTESTINAL NEGATIVE: 1
MUSCULOSKELETAL NEGATIVE: 1
PHOTOPHOBIA: 0
HEMOPTYSIS: 0
ABDOMINAL PAIN: 0
VOMITING: 0
POLYDIPSIA: 0
NEUROLOGICAL NEGATIVE: 1
HEADACHES: 0
PND: 0
EYE PAIN: 0
NAUSEA: 0
MEMORY LOSS: 0
FEVER: 0
CHILLS: 0
DOUBLE VISION: 0
ORTHOPNEA: 0
FOCAL WEAKNESS: 0
WHEEZING: 0
HALLUCINATIONS: 0
PSYCHIATRIC NEGATIVE: 1
SEIZURES: 0
ORTHOPNEA: 1
EYES NEGATIVE: 1
BLOOD IN STOOL: 0
PALPITATIONS: 0
DIZZINESS: 0
COUGH: 0
TREMORS: 0
BRUISES/BLEEDS EASILY: 0
CONSTITUTIONAL NEGATIVE: 1
SHORTNESS OF BREATH: 1
SPEECH CHANGE: 0
WEIGHT LOSS: 0
SPUTUM PRODUCTION: 0

## 2020-08-16 ASSESSMENT — FIBROSIS 4 INDEX: FIB4 SCORE: 2.11

## 2020-08-16 NOTE — PROGRESS NOTES
Patient arrived back from cath lab. Right wrist band in place, no bleeding noted at this time. Vital signs stable, afebrile, placed back on tele monitor. Patient denies any pain or other issues at this time. Will continue to monitor for this shift.

## 2020-08-16 NOTE — PROGRESS NOTES
Hospital Medicine Daily Progress Note    Date of Service  8/16/2020    Chief Complaint  65 y.o. male admitted 8/12/2020 with 2 weeks history of exertional dyspnea and leg swelling.    Hospital Course    Romansh speaking 65 y.o. male with uncontrolled diabetes, HTN, dyslipidemia who presented 8/12/2020 with increasing SOB with exertion over the past two weeks.  He has no fever, no chest pain.  He has had increase leg edema as well.  He states he cannot lay flat on his back w/o increase difficulty in breathing.     He has an elevated troponin and BNP. On exam has leg edema.      Interval Problem Update   services were used in the patient's primary language of English.     Name or Number:  Mode of interpretation: iPad    No acute events overnight.  Seen by cardiology yesterday.  Now on p.o. Lasix, beta-blocker and ARB.  Plan for left heart cath today.    Consultants/Specialty  Cardiology    Code Status  Full Code    Disposition  Home    Review of Systems  Review of Systems   Constitutional: Negative.    HENT: Negative.    Eyes: Negative.    Respiratory: Positive for shortness of breath. Negative for cough, sputum production and wheezing.    Cardiovascular: Negative for chest pain, palpitations, orthopnea, leg swelling and PND.   Gastrointestinal: Negative.    Genitourinary: Negative.    Musculoskeletal: Negative.    Skin: Negative.    Neurological: Negative.    Endo/Heme/Allergies: Negative.    Psychiatric/Behavioral: Negative.         Physical Exam  Temp:  [36 °C (96.8 °F)-36.4 °C (97.5 °F)] 36.1 °C (97 °F)  Pulse:  [66-79] 70  Resp:  [16-18] 16  BP: (103-133)/(57-92) 105/57  SpO2:  [92 %-97 %] 94 %    Physical Exam  Constitutional:       Appearance: Normal appearance.   HENT:      Head: Normocephalic and atraumatic.   Eyes:      Conjunctiva/sclera: Conjunctivae normal.      Pupils: Pupils are equal, round, and reactive to light.   Neck:      Musculoskeletal: Normal range of motion and neck  supple.   Cardiovascular:      Rate and Rhythm: Normal rate.      Pulses: Normal pulses.      Heart sounds: Murmur present.   Pulmonary:      Effort: Pulmonary effort is normal. No respiratory distress.      Breath sounds: No wheezing or rales.   Abdominal:      General: Bowel sounds are normal.      Palpations: Abdomen is soft.   Musculoskeletal: Normal range of motion.   Skin:     General: Skin is warm.      Comments: Chest-midline scars from prior cardiac surgery.   Neurological:      General: No focal deficit present.      Mental Status: He is alert and oriented to person, place, and time.   Psychiatric:         Mood and Affect: Mood normal.         Behavior: Behavior normal.         Fluids    Intake/Output Summary (Last 24 hours) at 8/16/2020 1359  Last data filed at 8/16/2020 0811  Gross per 24 hour   Intake 380 ml   Output 1050 ml   Net -670 ml       Laboratory      Recent Labs     08/14/20  0432 08/15/20  0355 08/16/20  0446   SODIUM 136 134* 130*   POTASSIUM 3.5* 3.6 3.5*   CHLORIDE 95* 92* 91*   CO2 26 29 25   GLUCOSE 131* 113* 90   BUN 30* 41* 51*   CREATININE 0.98 1.22 1.24   CALCIUM 9.6 9.8 9.3                   Imaging  EC-ECHOCARDIOGRAM COMPLETE W/ CONT   Final Result      DX-CHEST-PORTABLE (1 VIEW)   Final Result      1.  There are ill-defined bilateral interstitial opacities with a trace of left pleural fluid. Findings are most consistent with edema. Pneumonitis is considered possible but less likely.      CL-LEFT HEART CATHETERIZATION WITH POSSIBLE INTERVENTION    (Results Pending)        Assessment/Plan  Essential hypertension  Assessment & Plan  Monitor vitals  Continue with active treatment with losartan 25 mg with parameters.  Diuresis with Lasix        Acute systolic congestive heart failure (HCC)  Assessment & Plan  Increasing shortness of breath with exertion.  Elevated BNP and leg edema concerning for heart failure.  Patient has a history of valvular heart surgery about 4 years ago with  [facility]. Has never been on diuretic outpatient.  TTE:   Echocardiogram revealed an EF of 25%. It also shows moderate LVH, akinesis of mid to distal inferior wall. Known MVR which is funcitoning normally with appropriate transvalvular gradient  Cardiology following  Plan for [facility] today  PO Lasix 40 MG daily  BB  Monitor electrolytes.  Monitor kidney function.  Strict I's and O's /daily weights.  Wean oxygen as tolerated.    Elevated troponin  Assessment & Plan  No concern for ACS.  Monitor on telemetry  Aspirin and statin    Elevated liver enzymes  Assessment & Plan  Question of secondary to hepatic congestion  elevated BNP and checking echocardiogram  Diuresis  Monitor CMP  Obese with fatty liver being possible component       Type 2 diabetes mellitus, with long-term current use of insulin (HCC)  Assessment & Plan  Grossly uncontrolled diabetic with a A1c of 17.2 in the past 3 months  Monitor Accu-Cheks continue with his outpatient Humulin.  Diabetic diet  He needs ongoing diabetic education            VTE prophylaxis: Lovenox

## 2020-08-16 NOTE — CARE PLAN
Problem: Pain Management  Goal: Pain level will decrease to patient's comfort goal  Intervention: Follow pain managment plan developed in collaboration with patient and Interdisciplinary Team  Note: Pain assessed q2 hours

## 2020-08-16 NOTE — PROGRESS NOTES
Assumed care of patient. Received report from night RN. Patient awake and sitting up in chair. On 1.5L O2, tolerating well. A/O x4. NPO since midnight for possible cath today. No other concerns expressed at this time.

## 2020-08-16 NOTE — RESPIRATORY CARE
Oxygen Rounds      Patient found on    O2 L/m:  ____1_____    Oxygen device:  ____nc____   Spo2: _____96____%      Respiratory device skin site inspection completed.

## 2020-08-16 NOTE — PROCEDURES
DATE OF SERVICE:  08/16/2020    REFERRING PHYSICIAN:  Barbara Raymundo MD    PROCEDURES:  1.  Left heart catheterization.  2.  Coronary angiography.  3.  Left ventriculogram.  4.  Monitoring of conscious sedation.    PREPROCEDURE DIAGNOSES:  Acute systolic congestive heart failure, dilated   cardiomyopathy with ejection fraction of 25%.    POSTPROCEDURE DIAGNOSES:  1.  Two-vessel coronary artery disease, chronic total occlusion of the mid   left anterior descending artery, high-grade mid posterior descending artery   and ostial posterolateral stenosis.  2.  Reduced left ventricular systolic function with ejection fraction of 20%.  3.  Elevated left ventricular end-diastolic pressure.    INDICATION:  The patient is a 65-year-old male with past medical history   significant for mitral valve replacement.  He was admitted for congestive   heart failure and echocardiogram showed ejection fraction of 25%.  He was   scheduled for cardiac catheterization.    PROCEDURE:  After informed consent was signed by the patient, patient was   brought to the cardiac catheterization laboratory.  He was prepped and draped   in the usual sterile manner.  The right wrist area was anesthetized with 2%   Xylocaine.  A 6-Prydeinig sheath was inserted into the right radial artery using   the modified Seldinger technique.  Intra-arterial verapamil and nitroglycerin   were given.  IV heparin was given.  A 4-Prydeinig pigtail catheter was positioned   into the left ventricle.  Left ventriculography was performed.  This catheter   was exchanged for a TIG catheter, which was positioned into the right   coronary artery.  Coronary angiography was performed.  This was exchanged for   an AL2 catheter, which was positioned into the left main coronary artery.    Coronary angiography was performed.  The patient tolerated the procedure well.    At the end of procedure, all catheters and sheaths were removed.  Hemoband   was placed on the right wrist.  He was  transferred back to telemetry in stable   condition.    HEMODYNAMIC DATA:  Hemodynamic data shows aortic pressures of 120/80 mmHg  with mean of 100 mmHg and /0 with LVEDP of 20 mmHg.    AORTIC VALVE:  There was no significant gradient noted.    LEFT VENTRICULOGRAM:  A 10 mL of contrast was delivered for 2 seconds.    Ejection fraction was estimated to be 20%.  There was global hypokinesis   noted.    ANGIOGRAM:  1.  Left main coronary artery:  Left main coronary artery is a moderate   length, large-caliber vessel, free of disease.  2.  Left anterior descending artery:  Left anterior descending artery is   occluded at the midportion.  There are no collateral filling of the left   anterior descending.  3.  Left circumflex artery:  Left circumflex artery is a nondominant,   moderate-caliber vessel with small obtuse marginal branches.  4.  Right coronary artery:  Right coronary artery is a dominant, large-caliber   vessel with proximal luminal irregularities of 20%.  Distally, there is a   long, moderate-caliber posterior descending artery with mid concentric 99%   stenosis.  There is a posterolateral branch, moderate-caliber with proximal   concentric 90% stenosis.    IMPRESSION:  1.  Two-vessel coronary artery disease, chronic total occlusion of the mid   left anterior descending artery, high-grade mid posterior descending artery   and ostial posterolateral stenosis.  2.  Reduced left ventricular systolic function with ejection fraction of 20%.  3.  Elevated left ventricular end-diastolic pressure.    RECOMMENDATIONS:  Recommend CT surgery consultation.    SEDATION TIME: The patient's sedation was managed by myself with continuous   face to face time with the patient for 15 minutes from 12:00 to 12:15.         ____________________________________     MD CALIN TALAVERA / CRISTELA    DD:  08/16/2020 12:51:04  DT:  08/16/2020 13:42:14    D#:  [id number]  Job#:  [id number]

## 2020-08-16 NOTE — CONSULTS
REFERRING PHYSICIAN: Mika Vaca MD.     CONSULTING PHYSICIAN: Christopher Dorantes DO    CHIEF COMPLAINT: Dyspnea on exertion, orthopnea, LE edema.    HISTORY OF PRESENT ILLNESS: The patient is a 65 y.o. male with history significant for HTN, hyperlipidemia and insulin dependent diabetes.  He is s/p mitral valve replacement at [facility] in 2016.  He presented to [facility] 8/12/2020 with dyspnea on exertion, orthopnea and worsening LE edema.  He states she symptoms started approximately 2 weeks prior to his admission.  Denied associated chest pain, fever, chills, cough, dizziness, syncope or near syncope.  No history of drug or alcohol abuse.  Echocardiogram showed normal function of his bioprosthetic mitral valve and a new onset cardiomyopathy, EF 20%.  Troponins slightly elevated.  His symptoms have improved with diuretics.  Due to his new onset cardiomyopathy, he had cardiac catheterization today which revealed multivessel CAD and I was asked to consult.  Currently, the patient is resting in his chair and free of chest pain.  He has no other current complaints    PAST MEDICAL HISTORY:   Past Medical History:   Diagnosis Date   • Cataract     OU-had surgery   • Diabetes (HCC)     Takes Insulin and oral medication   • Full dentures    • Glaucoma     OU Had Surgery   • Heart valve disease     Pts. friend states, Heart Valve Replacement At [facility] 2016    • High cholesterol    • Hypertension    • Pneumonia     H/O 2016   • Snoring        PAST SURGICAL HISTORY:   Past Surgical History:   Procedure Laterality Date   • VITRECTOMY POSTERIOR Left 6/9/2020    Procedure: VITRECTOMY, POSTERIOR PORTION- MAMBRANE PEEL POSS LASER GAS OR OIL;  Surgeon: Deonte Miguel M.D.;  Location: SURGERY SAME DAY [facility];  Service: Ophthalmology   • OTHER      cataract bilateral   • OTHER      Surgery For Glaucoma OU    • OTHER CARDIAC SURGERY      Per pts. friend Heart valve replacement 2016 [facility]       FAMILY HISTORY:   History  reviewed. No pertinent family history.     SOCIAL HISTORY:   Social History     Socioeconomic History   • Marital status:      Spouse name: Not on file   • Number of children: Not on file   • Years of education: Not on file   • Highest education level: Not on file   Occupational History   • Not on file   Social Needs   • Financial resource strain: Not on file   • Food insecurity     Worry: Not on file     Inability: Not on file   • Transportation needs     Medical: Not on file     Non-medical: Not on file   Tobacco Use   • Smoking status: Never Smoker   • Smokeless tobacco: Never Used   Substance and Sexual Activity   • Alcohol use: No   • Drug use: No   • Sexual activity: Yes     Partners: Female   Lifestyle   • Physical activity     Days per week: Not on file     Minutes per session: Not on file   • Stress: Not on file   Relationships   • Social connections     Talks on phone: Not on file     Gets together: Not on file     Attends Confucianism service: Not on file     Active member of club or organization: Not on file     Attends meetings of clubs or organizations: Not on file     Relationship status: Not on file   • Intimate partner violence     Fear of current or ex partner: Not on file     Emotionally abused: Not on file     Physically abused: Not on file     Forced sexual activity: Not on file   Other Topics Concern   • Not on file   Social History Narrative   • Not on file       ALLERGIES:   No Known Allergies     CURRENT MEDICATIONS:     Current Facility-Administered Medications:   •  furosemide (LASIX) tablet 40 mg, 40 mg, Oral, Q DAY, Barbara Raymundo M.D., 40 mg at 08/16/20 0809  •  metoprolol (LOPRESSOR) tablet 25 mg, 25 mg, Oral, TWICE DAILY, Barbara Raymundo M.D., 25 mg at 08/16/20 0545  •  aspirin EC (ECOTRIN) tablet 81 mg, 81 mg, Oral, DAILY, Pranay Interiano D.OOrquidea, 81 mg at 08/16/20 0545  •  atorvastatin (LIPITOR) tablet 40 mg, 40 mg, Oral, Q EVENING, Gerald Greco D.OOrquidea, 40 mg at 08/15/20 1814  •   insulin 70/30 (Humulin 70/30,NovoLIN 70/30) injection, 15 Units, Subcutaneous, BID, DOMINGO NgO., Stopped at 08/16/20 0600  •  losartan (COZAAR) tablet 25 mg, 25 mg, Oral, Q EVENING, Barbara Raymundo M.D., 25 mg at 08/15/20 1814  •  senna-docusate (PERICOLACE or SENOKOT S) 8.6-50 MG per tablet 2 Tab, 2 Tab, Oral, BID, 2 Tab at 08/16/20 0540 **AND** polyethylene glycol/lytes (MIRALAX) PACKET 1 Packet, 1 Packet, Oral, QDAY PRN **AND** magnesium hydroxide (MILK OF MAGNESIA) suspension 30 mL, 30 mL, Oral, QDAY PRN **AND** bisacodyl (DULCOLAX) suppository 10 mg, 10 mg, Rectal, QDAY PRN, KEV Ng.O.  •  enoxaparin (LOVENOX) inj 40 mg, 40 mg, Subcutaneous, DAILY, KEV Ng.O., 40 mg at 08/16/20 0545  •  acetaminophen (TYLENOL) tablet 650 mg, 650 mg, Oral, Q6HRS PRN, KEV Ng.O.  •  enalaprilat (VASOTEC) injection 1.25 mg, 1.25 mg, Intravenous, Q6HRS PRN, KEV Ng.O.  •  ondansetron (ZOFRAN) syringe/vial injection 4 mg, 4 mg, Intravenous, Q4HRS PRN, KEV Ng.O.  •  ondansetron (ZOFRAN ODT) dispertab 4 mg, 4 mg, Oral, Q4HRS PRN, KEV Ng.O.  •  insulin regular (HumuLIN R,NovoLIN R) injection, 2-9 Units, Subcutaneous, 4X/DAY ACHS, Stopped at 08/16/20 0700 **AND** POC Blood Glucose, , , Q AC AND BEDTIME(S) **AND** NOTIFY MD and PharmD, , , Once **AND** glucose 4 g chewable tablet 16 g, 16 g, Oral, Q15 MIN PRN **AND** dextrose 50% (D50W) injection 50 mL, 50 mL, Intravenous, Q15 MIN PRN, Gerald Greco D.O.  •  potassium chloride SA (Kdur) tablet 20 mEq, 20 mEq, Oral, DAILY, Gerald Greco D.O., 20 mEq at 08/16/20 0545     LABS REVIEWED:  Lab Results   Component Value Date/Time    SODIUM 130 (L) 08/16/2020 04:46 AM    POTASSIUM 3.5 (L) 08/16/2020 04:46 AM    CHLORIDE 91 (L) 08/16/2020 04:46 AM    CO2 25 08/16/2020 04:46 AM    GLUCOSE 90 08/16/2020 04:46 AM    BUN 51 (H) 08/16/2020 04:46 AM    CREATININE 1.24 08/16/2020 04:46 AM      No results found for: PROTHROMBTM, INR    Lab Results   Component Value Date/Time    WBC 9.1 08/13/2020 03:35 AM    RBC 4.96 08/13/2020 03:35 AM    HEMOGLOBIN 14.7 08/13/2020 03:35 AM    HEMATOCRIT 43.3 08/13/2020 03:35 AM    MCV 87.3 08/13/2020 03:35 AM    MCH 29.6 08/13/2020 03:35 AM    MCHC 33.9 08/13/2020 03:35 AM    RDW 42.1 08/13/2020 03:35 AM    PLATELETCT 190 08/13/2020 03:35 AM    MPV 10.6 08/13/2020 03:35 AM    NEUTSPOLYS 57.90 08/13/2020 03:35 AM    LYMPHOCYTES 22.80 08/13/2020 03:35 AM    MONOCYTES 12.40 08/13/2020 03:35 AM    EOSINOPHILS 6.20 08/13/2020 03:35 AM    BASOPHILS 0.50 08/13/2020 03:35 AM        IMAGING REVIEWED AND INTERPRETED:    ECHOCARDIOGRAM: 8/13/2020 [facility] [place]   Contrast was used to enhance visualization of the endocardial border.  Moderate concentric left ventricular hypertrophy.  Left ventricular ejection fraction is visually estimated to be 25%.  Akinesis of the mid to distal inferior wall, distal anterior wall , and   mid to distal septum.  [place] is mildly dyskinetic without thrombus.  A reliable estimation of diastolic function cannot be made due to   mitral valve disease.  Known mitral valve replacement which is functioning normally with   appropriate transvalvular gradient.  Mean transvalvular gradient is 4  mmHg at a heart rate of 83 BPM.  Mild aortic insufficiency.  Aortic sclerosis without stenosis.  Unable to estimate pulmonary artery pressure due to an inadequate   tricuspid regurgitant jet.  A reliable estimation of diastolic function cannot be made due to   mitral valve disease.  Normal pericardium without effusion.    ANGIOGRAM: 8/16/2020  1.  Two-vessel coronary artery disease, chronic total occlusion of the mid   left anterior descending artery, high-grade mid posterior descending artery   stenosis.  2.  Reduced left ventricular systolic function with ejection fraction of 20%.  3.  Elevated left ventricular end-diastolic pressure.    REVIEW OF SYSTEMS:   Review of Systems   Constitutional: Negative for chills,  "fever and weight loss.   HENT: Negative for ear pain, nosebleeds and tinnitus.    Eyes: Negative for double vision, photophobia and pain.   Respiratory: Positive for shortness of breath. Negative for cough and hemoptysis.    Cardiovascular: Positive for orthopnea and leg swelling. Negative for chest pain, palpitations and PND.   Gastrointestinal: Negative for abdominal pain, blood in stool, nausea and vomiting.   Genitourinary: Negative for frequency, hematuria and urgency.   Musculoskeletal: Positive for joint pain.   Skin: Negative for rash.   Neurological: Negative for dizziness, tremors, speech change, focal weakness, seizures and headaches.   Endo/Heme/Allergies: Negative for polydipsia. Does not bruise/bleed easily.   Psychiatric/Behavioral: Negative for hallucinations and memory loss.       PHYSICAL EXAMINATION:   Physical Exam   Constitutional: He is oriented to person, place, and time and well-developed, well-nourished, and in no distress. No distress.   HENT:   Head: Normocephalic and atraumatic.   Eyes: Pupils are equal, round, and reactive to light.   Neck: Neck supple. No JVD present.   Cardiovascular: Normal rate, regular rhythm, normal heart sounds and intact distal pulses. Exam reveals no gallop and no friction rub.   No murmur heard.  Pulmonary/Chest: Effort normal. No respiratory distress. He has no wheezes. He has no rales.   Abdominal: Soft. He exhibits no distension. There is no abdominal tenderness. There is no rebound.   Musculoskeletal:         General: Edema present. No tenderness or deformity.   Neurological: He is alert and oriented to person, place, and time. Gait normal. GCS score is 15.   Skin: Skin is warm and dry. No rash noted. No erythema.   Psychiatric: Memory and affect normal.   Nursing note and vitals reviewed.    CONSTITUTIONAL:  /70   Pulse 68   Temp 36.3 °C (97.3 °F) (Temporal)   Resp 16   Ht 1.676 m (5' 6\")   Wt 87.2 kg (192 lb 3.9 oz)   SpO2 96%   "       IMPRESSION:  Acute systolic congestive heart failure, multivessel coronary artery disease, previous sternotomy with mitral valve replacement, type 2 diabetes on insulin, hypertension, obesity      PLAN:  I recommend the patient be evaluated for high risk PCI to the right.  Unfortunately, he does not appear to have a target at the LAD.  The benefits of reoperative surgery and bypass are much lower than the risk without revascularization of the LAD territory.  I believe the patient to be a nonoperative candidate currently.  Due to the complexity of his anatomy and reoperative status, I also discussed this with my partners, who also agree.  The procedure, its risks, benefits, potential complications and alternative treatments were discussed with the patient in detail including the risks should he decide not to undergo my recommended treatment. All of his questions were answered to his satisfaction.    Findings and recommendations have been discussed with the patient’s cardiologist, Dr. Vaca.  Thank you for this challenging consultation and participation in the patient’s care.  I will keep you apprised of all future developments.          Sincerely,       Christopher Dorantes DO.      I,  Christopher Dorantes DO performed a substantial portion of the EM visit face-to-face with the same patient on the same date of service with the APRN, Virgen Kirkland PA-C. I was personally involved in reviewing and interpreting the films and conducted elements of the history and physical exam. I performed all of the medical decision making for the patient.

## 2020-08-17 ENCOUNTER — PATIENT OUTREACH (OUTPATIENT)
Dept: HEALTH INFORMATION MANAGEMENT | Facility: OTHER | Age: 65
End: 2020-08-17

## 2020-08-17 PROBLEM — I25.10 CORONARY ARTERY DISEASE INVOLVING NATIVE CORONARY ARTERY OF NATIVE HEART WITHOUT ANGINA PECTORIS: Status: ACTIVE | Noted: 2020-08-17

## 2020-08-17 LAB
ANION GAP SERPL CALC-SCNC: 15 MMOL/L (ref 7–16)
BUN SERPL-MCNC: 58 MG/DL (ref 8–22)
CALCIUM SERPL-MCNC: 9.1 MG/DL (ref 8.5–10.5)
CHLORIDE SERPL-SCNC: 93 MMOL/L (ref 96–112)
CO2 SERPL-SCNC: 25 MMOL/L (ref 20–33)
CREAT SERPL-MCNC: 1.22 MG/DL (ref 0.5–1.4)
GLUCOSE BLD-MCNC: 119 MG/DL (ref 65–99)
GLUCOSE BLD-MCNC: 278 MG/DL (ref 65–99)
GLUCOSE BLD-MCNC: 289 MG/DL (ref 65–99)
GLUCOSE BLD-MCNC: 343 MG/DL (ref 65–99)
GLUCOSE SERPL-MCNC: 116 MG/DL (ref 65–99)
MAGNESIUM SERPL-MCNC: 2.2 MG/DL (ref 1.5–2.5)
POTASSIUM SERPL-SCNC: 4 MMOL/L (ref 3.6–5.5)
SODIUM SERPL-SCNC: 133 MMOL/L (ref 135–145)

## 2020-08-17 PROCEDURE — 700102 HCHG RX REV CODE 250 W/ 637 OVERRIDE(OP): Performed by: INTERNAL MEDICINE

## 2020-08-17 PROCEDURE — A9270 NON-COVERED ITEM OR SERVICE: HCPCS | Performed by: INTERNAL MEDICINE

## 2020-08-17 PROCEDURE — 770020 HCHG ROOM/CARE - TELE (206)

## 2020-08-17 PROCEDURE — A9270 NON-COVERED ITEM OR SERVICE: HCPCS | Performed by: HOSPITALIST

## 2020-08-17 PROCEDURE — A9270 NON-COVERED ITEM OR SERVICE: HCPCS | Performed by: PHYSICIAN ASSISTANT

## 2020-08-17 PROCEDURE — 700102 HCHG RX REV CODE 250 W/ 637 OVERRIDE(OP): Performed by: HOSPITALIST

## 2020-08-17 PROCEDURE — 80048 BASIC METABOLIC PNL TOTAL CA: CPT

## 2020-08-17 PROCEDURE — 99233 SBSQ HOSP IP/OBS HIGH 50: CPT | Performed by: INTERNAL MEDICINE

## 2020-08-17 PROCEDURE — 99232 SBSQ HOSP IP/OBS MODERATE 35: CPT | Performed by: STUDENT IN AN ORGANIZED HEALTH CARE EDUCATION/TRAINING PROGRAM

## 2020-08-17 PROCEDURE — A9270 NON-COVERED ITEM OR SERVICE: HCPCS | Performed by: STUDENT IN AN ORGANIZED HEALTH CARE EDUCATION/TRAINING PROGRAM

## 2020-08-17 PROCEDURE — 700102 HCHG RX REV CODE 250 W/ 637 OVERRIDE(OP): Performed by: PHYSICIAN ASSISTANT

## 2020-08-17 PROCEDURE — 83735 ASSAY OF MAGNESIUM: CPT

## 2020-08-17 PROCEDURE — 94760 N-INVAS EAR/PLS OXIMETRY 1: CPT

## 2020-08-17 PROCEDURE — 82962 GLUCOSE BLOOD TEST: CPT

## 2020-08-17 PROCEDURE — 700102 HCHG RX REV CODE 250 W/ 637 OVERRIDE(OP): Performed by: STUDENT IN AN ORGANIZED HEALTH CARE EDUCATION/TRAINING PROGRAM

## 2020-08-17 PROCEDURE — 36415 COLL VENOUS BLD VENIPUNCTURE: CPT

## 2020-08-17 RX ORDER — METOPROLOL SUCCINATE 50 MG/1
50 TABLET, EXTENDED RELEASE ORAL EVERY EVENING
Status: DISCONTINUED | OUTPATIENT
Start: 2020-08-17 | End: 2020-08-19

## 2020-08-17 RX ADMIN — POTASSIUM CHLORIDE 20 MEQ: 1500 TABLET, EXTENDED RELEASE ORAL at 06:16

## 2020-08-17 RX ADMIN — INSULIN HUMAN 5 UNITS: 100 INJECTION, SOLUTION PARENTERAL at 17:23

## 2020-08-17 RX ADMIN — INSULIN HUMAN 15 UNITS: 100 INJECTION, SUSPENSION SUBCUTANEOUS at 08:00

## 2020-08-17 RX ADMIN — ASPIRIN 81 MG: 81 TABLET, COATED ORAL at 06:16

## 2020-08-17 RX ADMIN — INSULIN HUMAN 15 UNITS: 100 INJECTION, SUSPENSION SUBCUTANEOUS at 17:24

## 2020-08-17 RX ADMIN — FUROSEMIDE 40 MG: 40 TABLET ORAL at 09:25

## 2020-08-17 RX ADMIN — LOSARTAN POTASSIUM 25 MG: 25 TABLET, FILM COATED ORAL at 17:28

## 2020-08-17 RX ADMIN — ATORVASTATIN CALCIUM 40 MG: 40 TABLET, FILM COATED ORAL at 17:28

## 2020-08-17 RX ADMIN — DOCUSATE SODIUM 50 MG AND SENNOSIDES 8.6 MG 2 TABLET: 8.6; 5 TABLET, FILM COATED ORAL at 17:28

## 2020-08-17 RX ADMIN — INSULIN HUMAN 6 UNITS: 100 INJECTION, SOLUTION PARENTERAL at 11:46

## 2020-08-17 RX ADMIN — INSULIN HUMAN 5 UNITS: 100 INJECTION, SOLUTION PARENTERAL at 20:05

## 2020-08-17 RX ADMIN — METOPROLOL SUCCINATE 50 MG: 50 TABLET, EXTENDED RELEASE ORAL at 17:28

## 2020-08-17 RX ADMIN — DOCUSATE SODIUM 50 MG AND SENNOSIDES 8.6 MG 2 TABLET: 8.6; 5 TABLET, FILM COATED ORAL at 06:16

## 2020-08-17 RX ADMIN — METOPROLOL TARTRATE 25 MG: 25 TABLET, FILM COATED ORAL at 06:16

## 2020-08-17 ASSESSMENT — ENCOUNTER SYMPTOMS
GASTROINTESTINAL NEGATIVE: 1
UNEXPECTED WEIGHT CHANGE: 1
MYALGIAS: 0
PALPITATIONS: 0
ABDOMINAL DISTENTION: 0
MUSCULOSKELETAL NEGATIVE: 1
EYES NEGATIVE: 1
PSYCHIATRIC NEGATIVE: 1
CONSTITUTIONAL NEGATIVE: 1
SPUTUM PRODUCTION: 0
ORTHOPNEA: 0
NEUROLOGICAL NEGATIVE: 1
PND: 0
COUGH: 0
SHORTNESS OF BREATH: 0
LIGHT-HEADEDNESS: 0
WHEEZING: 0
DIZZINESS: 0

## 2020-08-17 NOTE — ASSESSMENT & PLAN NOTE
[facility] - s/p stenting of posterior descending artery with 2.25 x 24 mm Synergy drug eluting stent with no significant residual stenosis and EL-3 flow, s/p balloon angioplasty and stenting of mid to distal left anterior descending artery with 2 x 30 mm Cain drug eluting stent with no significant restenosis in the stented segment but residual diffuse disease in the distal left anterior descending artery with EL-3 flow. 8/19/2020  [facility] - Two-vessel coronary artery disease, chronic total occlusion of the mid left anterior descending artery, high-grade mid posterior descending artery and ostial posterolateral stenosis. 8/16/2020  ASA, Brilinta, Lipitor, Toprol

## 2020-08-17 NOTE — PROGRESS NOTES
Cardiology brief note    Patient referred for coronary angiography today that showed two-vessel coronary artery disease.  Evaluation for CABG recommended.    CT surgery consultation requested.    Barbara Raymundo MD, [facility]  Cardiologist  [facility]

## 2020-08-17 NOTE — PROGRESS NOTES
Cardiology Follow Up Progress Note    Date of Service  8/17/2020    Attending Physician  Pranay Interiano D.O.    Chief Complaint   Volume overload    HPI  Chino Deluca is a 65 y.o. male admitted 8/12/2020 with volume overload (CRAIN, orthopnea, PND, weight gain). Found to have a severely reduced, EF 25%. Work up included a coronary angiogram which showed  of mid LAD and high grade stenosis of posterior descending artery and posterolateral branch of LCx.     Other history includes S/P Mitral valve replacement 2016, HTN, HLD, IDDM.     Interim Events  The iPAD  was used for this visit.    Sitting up in chair this morning. Denies any cardiac complaints, his breathing is much improved, denies any orthopnea, PND, leg swelling. Denies any chest pressure/angina both in the hospital or prior to hospitalized.     Monitor Summary: SR 67-77     Review of Systems  Review of Systems   Constitutional: Positive for unexpected weight change (weight gain prior to admission).   Respiratory: Negative for cough and shortness of breath.    Cardiovascular: Negative for chest pain, palpitations and leg swelling.   Gastrointestinal: Negative for abdominal distention.   Genitourinary: Negative for difficulty urinating.   Musculoskeletal: Negative for myalgias.   Neurological: Negative for dizziness and light-headedness.       Vital signs in last 24 hours  Temp:  [35.9 °C (96.7 °F)-36.4 °C (97.5 °F)] 36.1 °C (97 °F)  Pulse:  [61-80] 76  Resp:  [14-18] 18  BP: (100-149)/(57-92) 123/76  SpO2:  [92 %-99 %] 93 %    Physical Exam  Physical Exam  Vitals signs reviewed.   Constitutional:       Appearance: He is not ill-appearing.   HENT:      Head: Normocephalic and atraumatic.   Neck:      Comments: No JVD at 90deg  Cardiovascular:      Rate and Rhythm: Normal rate and regular rhythm.      Heart sounds: No murmur.   Pulmonary:      Breath sounds: No wheezing or rales.   Abdominal:      General: Abdomen is flat.       Palpations: Abdomen is soft.   Musculoskeletal:      Right lower leg: No edema.      Left lower leg: No edema.   Skin:     General: Skin is warm and dry.      Comments: Hair loss on lower extremities, chronic hyperpigmentation skin changes, signs of atrophy on lower legs   Neurological:      Mental Status: He is alert and oriented to person, place, and time.   Psychiatric:         Judgment: Judgment normal.         Lab Review  Lab Results   Component Value Date/Time    WBC 9.1 08/13/2020 03:35 AM    RBC 4.96 08/13/2020 03:35 AM    HEMOGLOBIN 14.7 08/13/2020 03:35 AM    HEMATOCRIT 43.3 08/13/2020 03:35 AM    MCV 87.3 08/13/2020 03:35 AM    MCH 29.6 08/13/2020 03:35 AM    MCHC 33.9 08/13/2020 03:35 AM    MPV 10.6 08/13/2020 03:35 AM      Lab Results   Component Value Date/Time    SODIUM 133 (L) 08/17/2020 04:55 AM    POTASSIUM 4.0 08/17/2020 04:55 AM    CHLORIDE 93 (L) 08/17/2020 04:55 AM    CO2 25 08/17/2020 04:55 AM    GLUCOSE 116 (H) 08/17/2020 04:55 AM    BUN 58 (H) 08/17/2020 04:55 AM    CREATININE 1.22 08/17/2020 04:55 AM      Lab Results   Component Value Date/Time    ASTSGOT 44 08/12/2020 01:35 PM    ALTSGPT 51 (H) 08/12/2020 01:35 PM     Lab Results   Component Value Date/Time    CHOLSTRLTOT 183 05/07/2020 03:09 AM    LDL see below 05/07/2020 03:09 AM    HDL 31 (A) 05/07/2020 03:09 AM    TRIGLYCERIDE 587 (H) 05/07/2020 03:09 AM    TROPONINT 36 (H) 08/13/2020 12:29 PM       No results for input(s): NTPROBNP in the last 72 hours.    Cardiac Imaging and Procedures Review    Echocardiogram:  8/13/20  CONCLUSIONS  No prior study is available for comparison.   Contrast was used to enhance visualization of the endocardial border.  Moderate concentric left ventricular hypertrophy.  Left ventricular ejection fraction is visually estimated to be 25%.  Akinesis of the mid to distal inferior wall, distal anterior wall , and   mid to distal septum.  [place] is mildly dyskinetic without thrombus.  A reliable estimation of  diastolic function cannot be made due to   mitral valve disease.  Known mitral valve replacement which is functioning normally with   appropriate transvalvular gradient.  Mean transvalvular gradient is 4  mmHg at a heart rate of 83 BPM.  Mild aortic insufficiency.  Aortic sclerosis without stenosis.  Unable to estimate pulmonary artery pressure due to an inadequate   tricuspid regurgitant jet.  A reliable estimation of diastolic function cannot be made due to   mitral valve disease.  Normal pericardium without effusion.  Results via [place] text to ordering physician at 08:42 hrs    Cardiac Catheterization:  8/16/20  ANGIOGRAM:  1.  Left main coronary artery:  Left main coronary artery is a moderate   length, large-caliber vessel, free of disease.  2.  Left anterior descending artery:  Left anterior descending artery is   occluded at the midportion.  There are no collateral filling of the left   anterior descending.  3.  Left circumflex artery:  Left circumflex artery is a nondominant,   moderate-caliber vessel with small obtuse marginal branches.  4.  Right coronary artery:  Right coronary artery is a dominant, large-caliber   vessel with proximal luminal irregularities of 20%.  Distally, there is a   long, moderate-caliber posterior descending artery with mid concentric 99% stenosis.  There is a posterolateral branch, moderate-caliber with proximal concentric 90% stenosis.     IMPRESSION:  1.  Two-vessel coronary artery disease, chronic total occlusion of the mid left anterior descending artery, high-grade mid posterior descending artery and ostial posterolateral stenosis.  2.  Reduced left ventricular systolic function with ejection fraction of 20%.  3.  Elevated left ventricular end-diastolic pressure.     RECOMMENDATIONS:  Recommend CT surgery consultation.      Assessment/Plan  Coronary Artery Disease  - aspirin 81mg, hold P2Y12 until CTS has seen patient  - lipitor 40mg  - consult CTS    HFrEF, new diagnosis,  Stage C NYHA class II (Class III on admission)  - NTproBNP on admit 1214  - lasix 40mg, potassium 20meq diuresed for 6 days, no CHF symptoms reported this morning, now showing signs of mild HALEY, will hold diuretics tomorrow  - losartan 25mg   - change to Metop SR 50mg  - Will add [place] to regimen this admission  - suspect ischemic cardiomyopathy given his wall motion abnormalities on echo. After revascularization will treat with GDMT for 3months and re-eval EF for ICD placement at that time.     Mitral Valve Replacement  - Functioning well, mean transvalvular gradient is 4mmHg at a heart rate of 83 BPM.  - cont aspirin 81mg    IDDM, uncontrolled  - A1C 10, down from 17, continue with strict glucose control, would from addition on SGLT2 inhibitor    Await CTS consultation, hold diuretics, may start [place]  Staffed with Dr. Chairez

## 2020-08-17 NOTE — PROGRESS NOTES
[facility] Medicine Daily Progress Note    Date of Service  8/17/2020    Chief Complaint  65 y.o. male admitted 8/12/2020 with 2 weeks history of exertional dyspnea and leg swelling.    Hospital Course    Maltese speaking 65 y.o. male with uncontrolled diabetes, HTN, dyslipidemia who presented 8/12/2020 with increasing SOB with exertion over the past two weeks.  He has no fever, no chest pain.  He has had increase leg edema as well.  He states he cannot lay flat on his back w/o increase difficulty in breathing.     He has an elevated troponin and BNP. On exam has leg edema.      Interval Problem Update   services were used in the patient's primary language of English.     Name or Number:  Mode of interpretation: iPad    No acute events overnight.   LHC showed two-vessel disease. CTS has been consulted for CABG evaluation.  Patient very emotional this morning when told about possibility of open heart surgery, but understands the severity of his heart disease.     Consultants/Specialty  Cardiology    Code Status  Full Code    Disposition  Home    Review of Systems  Review of Systems   Constitutional: Negative.    HENT: Negative.    Eyes: Negative.    Respiratory: Negative for cough, sputum production, shortness of breath and wheezing.    Cardiovascular: Negative for chest pain, palpitations, orthopnea, leg swelling and PND.   Gastrointestinal: Negative.    Genitourinary: Negative.    Musculoskeletal: Negative.    Skin: Negative.    Neurological: Negative.    Endo/Heme/Allergies: Negative.    Psychiatric/Behavioral: Negative.         Physical Exam  Temp:  [35.9 °C (96.7 °F)-36.4 °C (97.5 °F)] 36.1 °C (97 °F)  Pulse:  [61-80] 76  Resp:  [14-18] 18  BP: (100-149)/(57-92) 123/76  SpO2:  [92 %-99 %] 93 %    Physical Exam  Constitutional:       Appearance: Normal appearance.   HENT:      Head: Normocephalic and atraumatic.   Eyes:      Conjunctiva/sclera: Conjunctivae normal.      Pupils: Pupils are  equal, round, and reactive to light.   Neck:      Musculoskeletal: Normal range of motion and neck supple.   Cardiovascular:      Rate and Rhythm: Normal rate.      Pulses: Normal pulses.      Heart sounds: Murmur present.   Pulmonary:      Effort: Pulmonary effort is normal. No respiratory distress.      Breath sounds: No wheezing or rales.   Abdominal:      General: Bowel sounds are normal.      Palpations: Abdomen is soft.   Musculoskeletal: Normal range of motion.   Skin:     General: Skin is warm.      Comments: Chest-midline scars from prior cardiac surgery.   Neurological:      General: No focal deficit present.      Mental Status: He is alert and oriented to person, place, and time.   Psychiatric:         Mood and Affect: Mood normal.         Behavior: Behavior normal.         Fluids    Intake/Output Summary (Last 24 hours) at 8/17/2020 1152  Last data filed at 8/17/2020 0329  Gross per 24 hour   Intake 200 ml   Output 700 ml   Net -500 ml       Laboratory      Recent Labs     08/15/20  0355 08/16/20  0446 08/17/20  0455   SODIUM 134* 130* 133*   POTASSIUM 3.6 3.5* 4.0   CHLORIDE 92* 91* 93*   CO2 29 25 25   GLUCOSE 113* 90 116*   BUN 41* 51* 58*   CREATININE 1.22 1.24 1.22   CALCIUM 9.8 9.3 9.1                   Imaging  EC-ECHOCARDIOGRAM COMPLETE W/ CONT   Final Result      DX-CHEST-PORTABLE (1 VIEW)   Final Result      1.  There are ill-defined bilateral interstitial opacities with a trace of left pleural fluid. Findings are most consistent with edema. Pneumonitis is considered possible but less likely.      CL-LEFT HEART CATHETERIZATION WITH POSSIBLE INTERVENTION    (Results Pending)        Assessment/Plan  Essential hypertension  Assessment & Plan  Monitor vitals  losartan and Toprol  Po Lasix        Coronary artery disease involving native coronary artery of native heart without angina pectoris  Assessment & Plan  S/p LHC:   1.  Two-vessel coronary artery disease, chronic total occlusion of the mid    left anterior descending artery, high-grade mid posterior descending artery   and ostial posterolateral stenosis.  2.  Reduced left ventricular systolic function with ejection fraction of 20%.  3.  Elevated left ventricular end-diastolic pressure.       CTS consulted or CABG  On ASA/Statins.    Acute systolic congestive heart failure (HCC)  Assessment & Plan  Increasing shortness of breath with exertion.  Elevated BNP and leg edema concerning for heart failure.  Patient has a history of valvular heart surgery about 4 years ago with [facility]. Has never been on diuretic outpatient.  TTE:   Echocardiogram revealed an EF of 25%. It also shows moderate LVH, akinesis of mid to distal inferior wall. Known MVR which is funcitoning normally with appropriate transvalvular gradient  Cardiology following  S/p LHC-two-vessel disease.  CTS consulted for CABG.  On GDMT.   Monitor electrolytes.  Monitor kidney function.  Strict I's and O's /daily weights.  Wean oxygen as tolerated.    Elevated troponin  Assessment & Plan  No concern for ACS.  Monitor on telemetry  Aspirin and statin    Elevated liver enzymes  Assessment & Plan  Question of secondary to hepatic congestion  elevated BNP and checking echocardiogram  Diuresis  Monitor CMP  Obese with fatty liver being possible component       Type 2 diabetes mellitus, with long-term current use of insulin ([facility])  Assessment & Plan  Grossly uncontrolled diabetic with a A1c of 17.2 in the past 3 months  Monitor Accu-Cheks continue with his outpatient Humulin.  Diabetic diet  He needs ongoing diabetic education            VTE prophylaxis: Lovenox

## 2020-08-17 NOTE — RESPIRATORY CARE
Oxygen Rounds      Patient found on    O2 L/m:  ___2______    Oxygen device:  __nc______   Spo2: _____93____%      Respiratory device skin site inspection completed.

## 2020-08-17 NOTE — PROGRESS NOTES
Morning report received at bedside. Care assumed. Pt alert and awake sitting up in bed. No complaints of pain or discomfort. AOx4 and on 2L. Tele monitor on. Bed in lowest position and locked. Call light and all belongings within reach. No further needs at this time.

## 2020-08-17 NOTE — PROGRESS NOTES
Received report and assumed care of patient. Patient is alert and oriented x4 Monegas[place] speaking. Patient is in no signs of distress denies pain at this time. Patient has numbness to right radial site up to park of thumb. Patient was updated on the plan of care for the day. Call light within reach, bed in low position, 2 side rails up. Educated on fall risk, verbalizes understanding. Will continue to monitor.

## 2020-08-18 PROBLEM — Z95.2 S/P MVR (MITRAL VALVE REPLACEMENT): Status: ACTIVE | Noted: 2020-08-18

## 2020-08-18 PROBLEM — I42.9 CARDIOMYOPATHY (HCC): Status: ACTIVE | Noted: 2020-08-18

## 2020-08-18 PROBLEM — N17.9 AKI (ACUTE KIDNEY INJURY) (HCC): Status: ACTIVE | Noted: 2020-08-18

## 2020-08-18 PROBLEM — E87.6 HYPOKALEMIA: Status: ACTIVE | Noted: 2020-08-18

## 2020-08-18 PROBLEM — E83.42 HYPOMAGNESEMIA: Status: ACTIVE | Noted: 2020-08-18

## 2020-08-18 PROBLEM — E87.1 HYPONATREMIA: Status: ACTIVE | Noted: 2020-08-18

## 2020-08-18 LAB
ANION GAP SERPL CALC-SCNC: 13 MMOL/L (ref 7–16)
BUN SERPL-MCNC: 56 MG/DL (ref 8–22)
CALCIUM SERPL-MCNC: 9.7 MG/DL (ref 8.5–10.5)
CHLORIDE SERPL-SCNC: 97 MMOL/L (ref 96–112)
CO2 SERPL-SCNC: 27 MMOL/L (ref 20–33)
CREAT SERPL-MCNC: 1.23 MG/DL (ref 0.5–1.4)
GLUCOSE BLD-MCNC: 161 MG/DL (ref 65–99)
GLUCOSE BLD-MCNC: 199 MG/DL (ref 65–99)
GLUCOSE BLD-MCNC: 275 MG/DL (ref 65–99)
GLUCOSE BLD-MCNC: 89 MG/DL (ref 65–99)
GLUCOSE SERPL-MCNC: 78 MG/DL (ref 65–99)
POTASSIUM SERPL-SCNC: 4 MMOL/L (ref 3.6–5.5)
SODIUM SERPL-SCNC: 137 MMOL/L (ref 135–145)
TEST NAME 95000: ABNORMAL

## 2020-08-18 PROCEDURE — 82962 GLUCOSE BLOOD TEST: CPT

## 2020-08-18 PROCEDURE — 99232 SBSQ HOSP IP/OBS MODERATE 35: CPT | Performed by: FAMILY MEDICINE

## 2020-08-18 PROCEDURE — 770020 HCHG ROOM/CARE - TELE (206)

## 2020-08-18 PROCEDURE — 80048 BASIC METABOLIC PNL TOTAL CA: CPT

## 2020-08-18 PROCEDURE — A9270 NON-COVERED ITEM OR SERVICE: HCPCS | Performed by: HOSPITALIST

## 2020-08-18 PROCEDURE — 700102 HCHG RX REV CODE 250 W/ 637 OVERRIDE(OP): Performed by: HOSPITALIST

## 2020-08-18 PROCEDURE — 99233 SBSQ HOSP IP/OBS HIGH 50: CPT | Performed by: INTERNAL MEDICINE

## 2020-08-18 PROCEDURE — 700102 HCHG RX REV CODE 250 W/ 637 OVERRIDE(OP): Performed by: STUDENT IN AN ORGANIZED HEALTH CARE EDUCATION/TRAINING PROGRAM

## 2020-08-18 PROCEDURE — A9270 NON-COVERED ITEM OR SERVICE: HCPCS | Performed by: INTERNAL MEDICINE

## 2020-08-18 PROCEDURE — 700102 HCHG RX REV CODE 250 W/ 637 OVERRIDE(OP): Performed by: INTERNAL MEDICINE

## 2020-08-18 PROCEDURE — A9270 NON-COVERED ITEM OR SERVICE: HCPCS | Performed by: STUDENT IN AN ORGANIZED HEALTH CARE EDUCATION/TRAINING PROGRAM

## 2020-08-18 PROCEDURE — 700102 HCHG RX REV CODE 250 W/ 637 OVERRIDE(OP): Performed by: PHYSICIAN ASSISTANT

## 2020-08-18 PROCEDURE — 36415 COLL VENOUS BLD VENIPUNCTURE: CPT

## 2020-08-18 PROCEDURE — A9270 NON-COVERED ITEM OR SERVICE: HCPCS | Performed by: PHYSICIAN ASSISTANT

## 2020-08-18 RX ORDER — ATORVASTATIN CALCIUM 80 MG/1
80 TABLET, FILM COATED ORAL EVERY EVENING
Status: DISCONTINUED | OUTPATIENT
Start: 2020-08-18 | End: 2020-08-20 | Stop reason: HOSPADM

## 2020-08-18 RX ORDER — SODIUM CHLORIDE 9 MG/ML
INJECTION, SOLUTION INTRAVENOUS ONCE
Status: DISCONTINUED | OUTPATIENT
Start: 2020-08-18 | End: 2020-08-18

## 2020-08-18 RX ORDER — SODIUM CHLORIDE 9 MG/ML
INJECTION, SOLUTION INTRAVENOUS ONCE
Status: DISCONTINUED | OUTPATIENT
Start: 2020-08-19 | End: 2020-08-18

## 2020-08-18 RX ORDER — SODIUM CHLORIDE 9 MG/ML
INJECTION, SOLUTION INTRAVENOUS ONCE
Status: COMPLETED | OUTPATIENT
Start: 2020-08-19 | End: 2020-08-19

## 2020-08-18 RX ADMIN — DOCUSATE SODIUM 50 MG AND SENNOSIDES 8.6 MG 2 TABLET: 8.6; 5 TABLET, FILM COATED ORAL at 17:20

## 2020-08-18 RX ADMIN — LOSARTAN POTASSIUM 25 MG: 25 TABLET, FILM COATED ORAL at 17:20

## 2020-08-18 RX ADMIN — INSULIN HUMAN 2 UNITS: 100 INJECTION, SOLUTION PARENTERAL at 12:07

## 2020-08-18 RX ADMIN — METOPROLOL SUCCINATE 50 MG: 50 TABLET, EXTENDED RELEASE ORAL at 17:20

## 2020-08-18 RX ADMIN — INSULIN HUMAN 5 UNITS: 100 INJECTION, SOLUTION PARENTERAL at 20:07

## 2020-08-18 RX ADMIN — INSULIN HUMAN 2 UNITS: 100 INJECTION, SOLUTION PARENTERAL at 17:18

## 2020-08-18 RX ADMIN — ASPIRIN 81 MG: 81 TABLET, COATED ORAL at 04:59

## 2020-08-18 RX ADMIN — ATORVASTATIN CALCIUM 80 MG: 80 TABLET, FILM COATED ORAL at 17:20

## 2020-08-18 RX ADMIN — DOCUSATE SODIUM 50 MG AND SENNOSIDES 8.6 MG 2 TABLET: 8.6; 5 TABLET, FILM COATED ORAL at 04:59

## 2020-08-18 RX ADMIN — INSULIN HUMAN 15 UNITS: 100 INJECTION, SUSPENSION SUBCUTANEOUS at 08:05

## 2020-08-18 RX ADMIN — INSULIN HUMAN 15 UNITS: 100 INJECTION, SUSPENSION SUBCUTANEOUS at 17:18

## 2020-08-18 ASSESSMENT — ENCOUNTER SYMPTOMS
LIGHT-HEADEDNESS: 0
WHEEZING: 0
SPEECH CHANGE: 0
FOCAL WEAKNESS: 0
SHORTNESS OF BREATH: 0
COUGH: 0
SORE THROAT: 0
FEVER: 0
NERVOUS/ANXIOUS: 0
NERVOUS/ANXIOUS: 1
CHEST TIGHTNESS: 0
BACK PAIN: 0
NECK PAIN: 0
BLURRED VISION: 0
HEADACHES: 0
CHILLS: 0
PALPITATIONS: 0
FLANK PAIN: 0
ABDOMINAL DISTENTION: 0
ABDOMINAL PAIN: 0
VOMITING: 0
DIZZINESS: 0
NAUSEA: 0
DIARRHEA: 0
MYALGIAS: 0
HEARTBURN: 0
DIAPHORESIS: 0
SENSORY CHANGE: 0
WEAKNESS: 0

## 2020-08-18 ASSESSMENT — FIBROSIS 4 INDEX
FIB4 SCORE: 2.11
FIB4 SCORE: 2.11

## 2020-08-18 NOTE — CARE PLAN
Problem: Communication  Goal: The ability to communicate needs accurately and effectively will improve  Intervention: Educate patient and significant other/support system about the plan of care, procedures, treatments, medications and allow for questions  Flowsheets (Taken 8/17/2020 2121)  Pt & Family Have Been Educated on Methods Available to Report Concerns Related to Care, Treatment, Services, and Patient Safety Issues: Yes     Problem: Fluid Volume:  Goal: Will maintain balanced intake and output  Intervention: Monitor, educate, and encourage compliance with therapeutic intake of liquids  Note: Monitoring I&Os

## 2020-08-18 NOTE — PROGRESS NOTES
Cardiology Follow Up Progress Note    Date of Service  8/18/2020    Attending Physician  Pranay Interiano D.O.    Chief Complaint   Volume overload    HPI  Chino Deluca is a 65 y.o. male admitted 8/12/2020 with volume overload (CRAIN, orthopnea, PND, weight gain). Found to have a severely reduced, EF 25%. Work up included a coronary angiogram which showed  of mid LAD and high grade stenosis of posterior descending artery and posterolateral branch of LCx.     Other history includes S/P Mitral valve replacement 2016, HTN, HLD, IDDM.     Interim Events  The iPAD  was used for this visit.  He is a little anxious about this plan this morning. Understands that surgery is not the recommended treatment but wants to make sure the PCI will adequate.   He denies any cardiac complaints, his breathing is much improved, denies any orthopnea, PND, leg swelling. Denies any chest pressure/angina both in the hospital or prior to hospitalized. Denies claudication symptoms in his legs.     Says his blood sugars are 170-180 at home.     Overnight he was sinus rhythm with 2nd degree type 2 beat.     Review of Systems    Review of Systems   Constitutional: Negative for chills and fever.   Respiratory: Negative for chest tightness and shortness of breath.    Cardiovascular: Negative for chest pain, palpitations and leg swelling.   Gastrointestinal: Negative for abdominal distention.   Musculoskeletal: Negative for gait problem.   Neurological: Negative for dizziness and light-headedness.   Psychiatric/Behavioral: The patient is nervous/anxious.        Vital signs in last 24 hours  Temp:  [35.9 °C (96.7 °F)-36.3 °C (97.4 °F)] 36.3 °C (97.3 °F)  Pulse:  [71-79] 79  Resp:  [16-18] 18  BP: (104-173)/() 104/75  SpO2:  [92 %-96 %] 96 %    Physical Exam  PE performed and unchanged from exam 8/17/20  Physical Exam  Vitals signs reviewed.   Constitutional:       Appearance: He is not ill-appearing.   HENT:       Head: Normocephalic and atraumatic.   Neck:      Comments: No JVD at 90deg  Cardiovascular:      Rate and Rhythm: Normal rate and regular rhythm.      Heart sounds: No murmur.   Pulmonary:      Breath sounds: Rales (bilateral bases) present. No wheezing.   Abdominal:      General: Abdomen is flat.      Palpations: Abdomen is soft.   Musculoskeletal:      Right lower leg: No edema.      Left lower leg: No edema.   Skin:     General: Skin is warm and dry.      Comments: Hair loss on lower extremities, chronic hyperpigmentation skin changes, signs of atrophy on lower legs   Neurological:      Mental Status: He is alert and oriented to person, place, and time.   Psychiatric:         Judgment: Judgment normal.         Lab Review  Lab Results   Component Value Date/Time    WBC 9.1 08/13/2020 03:35 AM    RBC 4.96 08/13/2020 03:35 AM    HEMOGLOBIN 14.7 08/13/2020 03:35 AM    HEMATOCRIT 43.3 08/13/2020 03:35 AM    MCV 87.3 08/13/2020 03:35 AM    MCH 29.6 08/13/2020 03:35 AM    MCHC 33.9 08/13/2020 03:35 AM    MPV 10.6 08/13/2020 03:35 AM      Lab Results   Component Value Date/Time    SODIUM 137 08/18/2020 04:10 AM    POTASSIUM 4.0 08/18/2020 04:10 AM    CHLORIDE 97 08/18/2020 04:10 AM    CO2 27 08/18/2020 04:10 AM    GLUCOSE 78 08/18/2020 04:10 AM    BUN 56 (H) 08/18/2020 04:10 AM    CREATININE 1.23 08/18/2020 04:10 AM      Lab Results   Component Value Date/Time    ASTSGOT 44 08/12/2020 01:35 PM    ALTSGPT 51 (H) 08/12/2020 01:35 PM     Lab Results   Component Value Date/Time    CHOLSTRLTOT 183 05/07/2020 03:09 AM    LDL see below 05/07/2020 03:09 AM    HDL 31 (A) 05/07/2020 03:09 AM    TRIGLYCERIDE 587 (H) 05/07/2020 03:09 AM    TROPONINT 36 (H) 08/13/2020 12:29 PM       No results for input(s): NTPROBNP in the last 72 hours.    Cardiac Imaging and Procedures Review    Echocardiogram:  8/13/20  CONCLUSIONS  No prior study is available for comparison.   Contrast was used to enhance visualization of the endocardial  border.  Moderate concentric left ventricular hypertrophy.  Left ventricular ejection fraction is visually estimated to be 25%.  Akinesis of the mid to distal inferior wall, distal anterior wall , and   mid to distal septum.  [place] is mildly dyskinetic without thrombus.  A reliable estimation of diastolic function cannot be made due to   mitral valve disease.  Known mitral valve replacement which is functioning normally with   appropriate transvalvular gradient.  Mean transvalvular gradient is 4  mmHg at a heart rate of 83 BPM.  Mild aortic insufficiency.  Aortic sclerosis without stenosis.  Unable to estimate pulmonary artery pressure due to an inadequate   tricuspid regurgitant jet.  A reliable estimation of diastolic function cannot be made due to   mitral valve disease.  Normal pericardium without effusion.  Results via [place] text to ordering physician at 08:42 hrs    Cardiac Catheterization:  8/16/20  ANGIOGRAM:  1.  Left main coronary artery:  Left main coronary artery is a moderate   length, large-caliber vessel, free of disease.  2.  Left anterior descending artery:  Left anterior descending artery is   occluded at the midportion.  There are no collateral filling of the left   anterior descending.  3.  Left circumflex artery:  Left circumflex artery is a nondominant,   moderate-caliber vessel with small obtuse marginal branches.  4.  Right coronary artery:  Right coronary artery is a dominant, large-caliber   vessel with proximal luminal irregularities of 20%.  Distally, there is a   long, moderate-caliber posterior descending artery with mid concentric 99% stenosis.  There is a posterolateral branch, moderate-caliber with proximal concentric 90% stenosis.     IMPRESSION:  1.  Two-vessel coronary artery disease, chronic total occlusion of the mid left anterior descending artery, high-grade mid posterior descending artery and ostial posterolateral stenosis.  2.  Reduced left ventricular systolic function with  ejection fraction of 20%.  3.  Elevated left ventricular end-diastolic pressure.     RECOMMENDATIONS:  Recommend CT surgery consultation.      Assessment/Plan  Coronary Artery Disease  - aspirin 81mg  - lipitor 40mg  - based on recommendations from Dr. Dorantes, plan for revascularization will be high risk PCI tomorrow, please keep NPO after midnight    HFrEF, new diagnosis, Stage C  Class III on admission, now with NYHA II symptoms  - NTproBNP on admit 1214  -  hold diuretics for today, weight today 186, admit weight 195lbs  - losartan 25mg   - Metop SR 50mg  - Will add [place] to regimen this admission, wait until after contrast load from high risk PCI  - suspect ischemic cardiomyopathy given his wall motion abnormalities on echo. After revascularization will treat with GDMT for 3months and re-eval EF for ICD placement at that time.     Mitral Valve Replacement  - Functioning well, mean transvalvular gradient is 4mmHg at a heart rate of 83 BPM.  - cont aspirin 81mg    IDDM, uncontrolled  - A1C 10, down from 17, continue with strict glucose control, would from addition on SGLT2 inhibitor  - high intensity statin    Plan for high risk PCI tomorrow, start [place] after contrast load from cath    Staffed with Dr. Chairez

## 2020-08-18 NOTE — PROGRESS NOTES
Report received. Patient oriented x4. Pain level 0 out of 10. Denies SOB. Fall risk interventions in place, bed in low position, pt upself. . Assessment completed.

## 2020-08-18 NOTE — CARE PLAN
Problem: Communication  Goal: The ability to communicate needs accurately and effectively will improve  Outcome: PROGRESSING AS EXPECTED     Problem: Fluid Volume:  Goal: Will maintain balanced intake and output  Outcome: PROGRESSING AS EXPECTED

## 2020-08-18 NOTE — PROGRESS NOTES
[facility] Medicine Daily Progress Note    Date of Service  8/18/2020    Chief Complaint  65 y.o. male admitted 8/12/2020 with Acute CHF.    Hospital Course  Admitted with acute congestive heart failure, noted to have cardiomyopathy with ejection fraction of 25%.  Left heart catheterization done which showed Two-vessel coronary artery disease, chronic total occlusion of the mid left anterior descending artery, high-grade mid posterior descending artery and ostial posterolateral stenosis.  Cardiac surgery was consulted but recommendation was PCI.     Interval Problem Update  CHF/CM - EF 25%  HALEY - crea 1.2  HTN - sbp 100-170  Diabetes - BS     Consultants/Specialty  Cardiology    Code Status  Full Code    Disposition  TBD    Review of Systems  Review of Systems   Constitutional: Negative for chills, diaphoresis, fever and malaise/fatigue.   HENT: Negative for congestion, hearing loss and sore throat.    Eyes: Negative for blurred vision.   Respiratory: Negative for cough, shortness of breath and wheezing.    Cardiovascular: Negative for chest pain, palpitations and leg swelling.   Gastrointestinal: Negative for abdominal pain, diarrhea, heartburn, nausea and vomiting.   Genitourinary: Negative for dysuria, flank pain and hematuria.   Musculoskeletal: Negative for back pain, joint pain, myalgias and neck pain.   Skin: Negative for rash.   Neurological: Negative for dizziness, sensory change, speech change, focal weakness, weakness and headaches.   Psychiatric/Behavioral: The patient is not nervous/anxious.         Physical Exam  Temp:  [35.6 °C (96 °F)-36.3 °C (97.4 °F)] 36.3 °C (97.3 °F)  Pulse:  [71-89] 88  Resp:  [16-18] 18  BP: (104-173)/() 133/80  SpO2:  [92 %-96 %] 92 %    Physical Exam  Vitals signs and nursing note reviewed.   Constitutional:       Appearance: He is obese.   HENT:      Head: Normocephalic and atraumatic.      Nose: No congestion.      Mouth/Throat:      Mouth: Mucous membranes are  moist.   Eyes:      Extraocular Movements: Extraocular movements intact.      Conjunctiva/sclera: Conjunctivae normal.      Pupils: Pupils are equal, round, and reactive to light.   Neck:      Musculoskeletal: No muscular tenderness.   Cardiovascular:      Rate and Rhythm: Normal rate and regular rhythm.   Pulmonary:      Effort: Pulmonary effort is normal.      Breath sounds: Normal breath sounds.   Abdominal:      General: Bowel sounds are normal. There is no distension.      Palpations: Abdomen is soft.      Tenderness: There is no abdominal tenderness. There is no guarding or rebound.   Musculoskeletal:      Right lower leg: Edema present.      Left lower leg: Edema present.   Lymphadenopathy:      Cervical: No cervical adenopathy.   Skin:     General: Skin is warm.   Neurological:      General: No focal deficit present.      Mental Status: He is alert and oriented to person, place, and time.      Cranial Nerves: No cranial nerve deficit.         Fluids    Intake/Output Summary (Last 24 hours) at 8/18/2020 1553  Last data filed at 8/17/2020 2100  Gross per 24 hour   Intake 358 ml   Output 300 ml   Net 58 ml       Laboratory      Recent Labs     08/16/20  0446 08/17/20  0455 08/18/20  0410   SODIUM 130* 133* 137   POTASSIUM 3.5* 4.0 4.0   CHLORIDE 91* 93* 97   CO2 25 25 27   GLUCOSE 90 116* 78   BUN 51* 58* 56*   CREATININE 1.24 1.22 1.23   CALCIUM 9.3 9.1 9.7                   Imaging  EC-ECHOCARDIOGRAM COMPLETE W/ CONT   Final Result      DX-CHEST-PORTABLE (1 VIEW)   Final Result      1.  There are ill-defined bilateral interstitial opacities with a trace of left pleural fluid. Findings are most consistent with edema. Pneumonitis is considered possible but less likely.      CL-LEFT HEART CATHETERIZATION WITH POSSIBLE INTERVENTION    (Results Pending)        Assessment/Plan  * Acute systolic congestive heart failure (HCC)- (present on admission)  Assessment & Plan  Toprol, Losartan  Holding diuretics  today    Cardiomyopathy (HCC)- (present on admission)  Assessment & Plan  Toprol, Losartan    Coronary artery disease involving native coronary artery of native heart without angina pectoris- (present on admission)  Assessment & Plan  LHC - Two-vessel coronary artery disease, chronic total occlusion of the mid left anterior descending artery, high-grade mid posterior descending artery and ostial posterolateral stenosis. 8/16/2020  Plan for high risk PCI tomorrow  ASA, Lipitor, Toprol    Hypomagnesemia- (present on admission)  Assessment & Plan  Follow level    Hypokalemia- (present on admission)  Assessment & Plan  Follow BMP    Hyponatremia- (present on admission)  Assessment & Plan  Follow BMP    HALEY (acute kidney injury) ([facility])- (present on admission)  Assessment & Plan  Follow BMP  Check PTH    Elevated troponin- (present on admission)  Assessment & Plan  Aspirin, Lipitor, Toprol    Essential hypertension- (present on admission)  Assessment & Plan  Losartan and Toprol    Type 2 diabetes mellitus with hyperglycemia, with long-term current use of insulin ([facility])- (present on admission)  Assessment & Plan  Insulin 70/30, SSI    S/P MVR (mitral valve replacement)- (present on admission)  Assessment & Plan  stable       VTE prophylaxis: Lovenox

## 2020-08-18 NOTE — PROGRESS NOTES
Monitor Summary  Sinus Rhythm with a First Degree Heart Block   1 beat of Second Degree Type II  .28/.08/.38

## 2020-08-19 ENCOUNTER — APPOINTMENT (OUTPATIENT)
Dept: CARDIOLOGY | Facility: MEDICAL CENTER | Age: 65
DRG: 247 | End: 2020-08-19
Attending: PHYSICIAN ASSISTANT
Payer: COMMERCIAL

## 2020-08-19 LAB
ANION GAP SERPL CALC-SCNC: 13 MMOL/L (ref 7–16)
APTT PPP: 30.4 SEC (ref 24.7–36)
BASOPHILS # BLD AUTO: 0.5 % (ref 0–1.8)
BASOPHILS # BLD: 0.05 K/UL (ref 0–0.12)
BUN SERPL-MCNC: 41 MG/DL (ref 8–22)
CALCIUM SERPL-MCNC: 9.4 MG/DL (ref 8.5–10.5)
CHLORIDE SERPL-SCNC: 99 MMOL/L (ref 96–112)
CO2 SERPL-SCNC: 26 MMOL/L (ref 20–33)
CREAT SERPL-MCNC: 1.1 MG/DL (ref 0.5–1.4)
EKG IMPRESSION: NORMAL
EOSINOPHIL # BLD AUTO: 0.58 K/UL (ref 0–0.51)
EOSINOPHIL NFR BLD: 5.6 % (ref 0–6.9)
ERYTHROCYTE [DISTWIDTH] IN BLOOD BY AUTOMATED COUNT: 41.7 FL (ref 35.9–50)
GLUCOSE BLD-MCNC: 129 MG/DL (ref 65–99)
GLUCOSE SERPL-MCNC: 93 MG/DL (ref 65–99)
HCT VFR BLD AUTO: 44.8 % (ref 42–52)
HGB BLD-MCNC: 15.3 G/DL (ref 14–18)
IMM GRANULOCYTES # BLD AUTO: 0.03 K/UL (ref 0–0.11)
IMM GRANULOCYTES NFR BLD AUTO: 0.3 % (ref 0–0.9)
INR PPP: 1.04 (ref 0.87–1.13)
LYMPHOCYTES # BLD AUTO: 2.32 K/UL (ref 1–4.8)
LYMPHOCYTES NFR BLD: 22.3 % (ref 22–41)
MAGNESIUM SERPL-MCNC: 2 MG/DL (ref 1.5–2.5)
MCH RBC QN AUTO: 29.4 PG (ref 27–33)
MCHC RBC AUTO-ENTMCNC: 34.2 G/DL (ref 33.7–35.3)
MCV RBC AUTO: 86 FL (ref 81.4–97.8)
MONOCYTES # BLD AUTO: 1.58 K/UL (ref 0–0.85)
MONOCYTES NFR BLD AUTO: 15.2 % (ref 0–13.4)
NEUTROPHILS # BLD AUTO: 5.86 K/UL (ref 1.82–7.42)
NEUTROPHILS NFR BLD: 56.1 % (ref 44–72)
NRBC # BLD AUTO: 0 K/UL
NRBC BLD-RTO: 0 /100 WBC
PLATELET # BLD AUTO: 224 K/UL (ref 164–446)
PMV BLD AUTO: 10.4 FL (ref 9–12.9)
POTASSIUM SERPL-SCNC: 3.8 MMOL/L (ref 3.6–5.5)
PROTHROMBIN TIME: 14 SEC (ref 12–14.6)
PTH-INTACT SERPL-MCNC: 21 PG/ML (ref 14–72)
RBC # BLD AUTO: 5.21 M/UL (ref 4.7–6.1)
SODIUM SERPL-SCNC: 138 MMOL/L (ref 135–145)
WBC # BLD AUTO: 10.4 K/UL (ref 4.8–10.8)

## 2020-08-19 PROCEDURE — 85025 COMPLETE CBC W/AUTO DIFF WBC: CPT

## 2020-08-19 PROCEDURE — 92943 PRQ TRLUML REVSC CH OCC ANT: CPT | Mod: LD | Performed by: INTERNAL MEDICINE

## 2020-08-19 PROCEDURE — A9270 NON-COVERED ITEM OR SERVICE: HCPCS | Performed by: STUDENT IN AN ORGANIZED HEALTH CARE EDUCATION/TRAINING PROGRAM

## 2020-08-19 PROCEDURE — 770020 HCHG ROOM/CARE - TELE (206)

## 2020-08-19 PROCEDURE — 83970 ASSAY OF PARATHORMONE: CPT

## 2020-08-19 PROCEDURE — 700102 HCHG RX REV CODE 250 W/ 637 OVERRIDE(OP): Performed by: HOSPITALIST

## 2020-08-19 PROCEDURE — 700102 HCHG RX REV CODE 250 W/ 637 OVERRIDE(OP): Performed by: STUDENT IN AN ORGANIZED HEALTH CARE EDUCATION/TRAINING PROGRAM

## 2020-08-19 PROCEDURE — 80048 BASIC METABOLIC PNL TOTAL CA: CPT

## 2020-08-19 PROCEDURE — A9270 NON-COVERED ITEM OR SERVICE: HCPCS | Performed by: PHYSICIAN ASSISTANT

## 2020-08-19 PROCEDURE — 027135Z DILATION OF CORONARY ARTERY, TWO ARTERIES WITH TWO DRUG-ELUTING INTRALUMINAL DEVICES, PERCUTANEOUS APPROACH: ICD-10-PCS | Performed by: INTERNAL MEDICINE

## 2020-08-19 PROCEDURE — 92928 PRQ TCAT PLMT NTRAC ST 1 LES: CPT | Mod: 59,RC | Performed by: INTERNAL MEDICINE

## 2020-08-19 PROCEDURE — 700102 HCHG RX REV CODE 250 W/ 637 OVERRIDE(OP)

## 2020-08-19 PROCEDURE — 99233 SBSQ HOSP IP/OBS HIGH 50: CPT | Performed by: INTERNAL MEDICINE

## 2020-08-19 PROCEDURE — A9270 NON-COVERED ITEM OR SERVICE: HCPCS | Performed by: HOSPITALIST

## 2020-08-19 PROCEDURE — 700101 HCHG RX REV CODE 250

## 2020-08-19 PROCEDURE — 700111 HCHG RX REV CODE 636 W/ 250 OVERRIDE (IP)

## 2020-08-19 PROCEDURE — 99232 SBSQ HOSP IP/OBS MODERATE 35: CPT | Performed by: FAMILY MEDICINE

## 2020-08-19 PROCEDURE — A9270 NON-COVERED ITEM OR SERVICE: HCPCS

## 2020-08-19 PROCEDURE — 83735 ASSAY OF MAGNESIUM: CPT

## 2020-08-19 PROCEDURE — 700105 HCHG RX REV CODE 258: Performed by: INTERNAL MEDICINE

## 2020-08-19 PROCEDURE — 82962 GLUCOSE BLOOD TEST: CPT

## 2020-08-19 PROCEDURE — C1887 CATHETER, GUIDING: HCPCS

## 2020-08-19 PROCEDURE — 85610 PROTHROMBIN TIME: CPT

## 2020-08-19 PROCEDURE — 700102 HCHG RX REV CODE 250 W/ 637 OVERRIDE(OP): Performed by: INTERNAL MEDICINE

## 2020-08-19 PROCEDURE — 700102 HCHG RX REV CODE 250 W/ 637 OVERRIDE(OP): Performed by: PHYSICIAN ASSISTANT

## 2020-08-19 PROCEDURE — 85730 THROMBOPLASTIN TIME PARTIAL: CPT

## 2020-08-19 PROCEDURE — 99152 MOD SED SAME PHYS/QHP 5/>YRS: CPT | Performed by: INTERNAL MEDICINE

## 2020-08-19 PROCEDURE — 36415 COLL VENOUS BLD VENIPUNCTURE: CPT

## 2020-08-19 PROCEDURE — A9270 NON-COVERED ITEM OR SERVICE: HCPCS | Performed by: INTERNAL MEDICINE

## 2020-08-19 PROCEDURE — 93010 ELECTROCARDIOGRAM REPORT: CPT | Mod: 59 | Performed by: INTERNAL MEDICINE

## 2020-08-19 PROCEDURE — 93005 ELECTROCARDIOGRAM TRACING: CPT | Performed by: INTERNAL MEDICINE

## 2020-08-19 PROCEDURE — 700117 HCHG RX CONTRAST REV CODE 255: Performed by: INTERNAL MEDICINE

## 2020-08-19 RX ORDER — VERAPAMIL HYDROCHLORIDE 2.5 MG/ML
INJECTION, SOLUTION INTRAVENOUS
Status: COMPLETED
Start: 2020-08-19 | End: 2020-08-19

## 2020-08-19 RX ORDER — SODIUM CHLORIDE 9 MG/ML
INJECTION, SOLUTION INTRAVENOUS CONTINUOUS
Status: DISCONTINUED | OUTPATIENT
Start: 2020-08-19 | End: 2020-08-19

## 2020-08-19 RX ORDER — HEPARIN SODIUM,PORCINE 1000/ML
VIAL (ML) INJECTION
Status: COMPLETED
Start: 2020-08-19 | End: 2020-08-19

## 2020-08-19 RX ORDER — LIDOCAINE HYDROCHLORIDE 20 MG/ML
INJECTION, SOLUTION INFILTRATION; PERINEURAL
Status: COMPLETED
Start: 2020-08-19 | End: 2020-08-19

## 2020-08-19 RX ORDER — BIVALIRUDIN 250 MG/5ML
INJECTION, POWDER, LYOPHILIZED, FOR SOLUTION INTRAVENOUS
Status: COMPLETED
Start: 2020-08-19 | End: 2020-08-19

## 2020-08-19 RX ORDER — MIDAZOLAM HYDROCHLORIDE 1 MG/ML
INJECTION INTRAMUSCULAR; INTRAVENOUS
Status: COMPLETED
Start: 2020-08-19 | End: 2020-08-19

## 2020-08-19 RX ORDER — HEPARIN SODIUM 200 [USP'U]/100ML
INJECTION, SOLUTION INTRAVENOUS
Status: COMPLETED
Start: 2020-08-19 | End: 2020-08-20

## 2020-08-19 RX ADMIN — METOPROLOL SUCCINATE 75 MG: 50 TABLET, EXTENDED RELEASE ORAL at 17:08

## 2020-08-19 RX ADMIN — ACETAMINOPHEN 650 MG: 325 TABLET, FILM COATED ORAL at 08:29

## 2020-08-19 RX ADMIN — INSULIN HUMAN 15 UNITS: 100 INJECTION, SUSPENSION SUBCUTANEOUS at 17:11

## 2020-08-19 RX ADMIN — FENTANYL CITRATE 25 MCG: 50 INJECTION INTRAMUSCULAR; INTRAVENOUS at 11:35

## 2020-08-19 RX ADMIN — TICAGRELOR 180 MG: 90 TABLET ORAL at 11:33

## 2020-08-19 RX ADMIN — LIDOCAINE HYDROCHLORIDE: 20 INJECTION, SOLUTION INFILTRATION; PERINEURAL at 10:15

## 2020-08-19 RX ADMIN — IOHEXOL 170 ML: 300 INJECTION, SOLUTION INTRAVENOUS at 10:45

## 2020-08-19 RX ADMIN — HEPARIN SODIUM: 1000 INJECTION, SOLUTION INTRAVENOUS; SUBCUTANEOUS at 10:15

## 2020-08-19 RX ADMIN — FENTANYL CITRATE: 50 INJECTION INTRAMUSCULAR; INTRAVENOUS at 10:15

## 2020-08-19 RX ADMIN — BIVALIRUDIN 250 MG: 250 INJECTION, POWDER, LYOPHILIZED, FOR SOLUTION INTRAVENOUS at 11:23

## 2020-08-19 RX ADMIN — MIDAZOLAM HYDROCHLORIDE 2 MG: 1 INJECTION, SOLUTION INTRAMUSCULAR; INTRAVENOUS at 10:44

## 2020-08-19 RX ADMIN — ATORVASTATIN CALCIUM 80 MG: 80 TABLET, FILM COATED ORAL at 17:08

## 2020-08-19 RX ADMIN — ASPIRIN 81 MG: 81 TABLET, COATED ORAL at 05:27

## 2020-08-19 RX ADMIN — DOCUSATE SODIUM 50 MG AND SENNOSIDES 8.6 MG 2 TABLET: 8.6; 5 TABLET, FILM COATED ORAL at 05:27

## 2020-08-19 RX ADMIN — BIVALIRUDIN 250 MG: 250 INJECTION, POWDER, LYOPHILIZED, FOR SOLUTION INTRAVENOUS at 10:44

## 2020-08-19 RX ADMIN — SODIUM CHLORIDE: 9 INJECTION, SOLUTION INTRAVENOUS at 12:15

## 2020-08-19 RX ADMIN — HEPARIN SODIUM 2000 UNITS: 200 INJECTION, SOLUTION INTRAVENOUS at 10:15

## 2020-08-19 RX ADMIN — LOSARTAN POTASSIUM 25 MG: 25 TABLET, FILM COATED ORAL at 17:08

## 2020-08-19 RX ADMIN — VERAPAMIL HYDROCHLORIDE 5 MG: 2.5 INJECTION, SOLUTION INTRAVENOUS at 10:15

## 2020-08-19 RX ADMIN — NITROGLYCERIN 10 ML: 20 INJECTION INTRAVENOUS at 10:15

## 2020-08-19 RX ADMIN — SODIUM CHLORIDE: 9 INJECTION, SOLUTION INTRAVENOUS at 05:31

## 2020-08-19 ASSESSMENT — ENCOUNTER SYMPTOMS
COUGH: 0
HEADACHES: 0
PALPITATIONS: 0
CHEST TIGHTNESS: 0
NAUSEA: 0
NECK PAIN: 0
BLURRED VISION: 0
SINUS PAIN: 1
DIARRHEA: 0
VOMITING: 0
FOCAL WEAKNESS: 0
WEAKNESS: 0
NERVOUS/ANXIOUS: 0
DIAPHORESIS: 0
DIZZINESS: 0
FEVER: 0
SHORTNESS OF BREATH: 0
HEARTBURN: 0
ABDOMINAL PAIN: 0
SENSORY CHANGE: 0
LIGHT-HEADEDNESS: 0
SORE THROAT: 0
SPEECH CHANGE: 0
BACK PAIN: 0
FLANK PAIN: 0
CHILLS: 0
FATIGUE: 0
WHEEZING: 0
MYALGIAS: 0
RHINORRHEA: 1

## 2020-08-19 ASSESSMENT — FIBROSIS 4 INDEX: FIB4 SCORE: 1.79

## 2020-08-19 NOTE — PROGRESS NOTES
[facility] Medicine Daily Progress Note    Date of Service  8/19/2020    Chief Complaint  65 y.o. male admitted 8/12/2020 with Acute CHF.    Hospital Course  Admitted with acute congestive heart failure, noted to have cardiomyopathy with ejection fraction of 25%.  Left heart catheterization done which showed Two-vessel coronary artery disease, chronic total occlusion of the mid to distal left anterior descending artery, high-grade mid posterior descending artery and ostial posterolateral stenosis.  Cardiac surgery was consulted but recommendation was PCI. LHC done with stents placed to posterior descending artery and mid to distal LAD.    Interval Problem Update  CHF/CM - EF 25%  HALEY - crea 1.1  HTN - sbp 110-130  Diabetes - BS     Consultants/Specialty  Cardiology    Code Status  Full Code    Disposition  TBD    Review of Systems  Review of Systems   Constitutional: Negative for chills, diaphoresis, fever and malaise/fatigue.   HENT: Negative for congestion, hearing loss and sore throat.    Eyes: Negative for blurred vision.   Respiratory: Negative for cough, shortness of breath and wheezing.    Cardiovascular: Negative for chest pain, palpitations and leg swelling.   Gastrointestinal: Negative for abdominal pain, diarrhea, heartburn, nausea and vomiting.   Genitourinary: Negative for dysuria, flank pain and hematuria.   Musculoskeletal: Negative for back pain, joint pain, myalgias and neck pain.   Skin: Negative for rash.   Neurological: Negative for dizziness, sensory change, speech change, focal weakness, weakness and headaches.   Psychiatric/Behavioral: The patient is not nervous/anxious.         Physical Exam  Temp:  [35.6 °C (96 °F)-37.1 °C (98.8 °F)] 35.6 °C (96 °F)  Pulse:  [61-80] 73  Resp:  [16-18] 18  BP: (113-135)/(62-83) 128/79  SpO2:  [90 %-94 %] 93 %    Physical Exam  Vitals signs and nursing note reviewed.   Constitutional:       Appearance: He is obese.   HENT:      Head: Normocephalic and  atraumatic.      Nose: No congestion.      Mouth/Throat:      Mouth: Mucous membranes are moist.   Eyes:      Extraocular Movements: Extraocular movements intact.      Conjunctiva/sclera: Conjunctivae normal.      Pupils: Pupils are equal, round, and reactive to light.   Neck:      Musculoskeletal: No muscular tenderness.   Cardiovascular:      Rate and Rhythm: Normal rate and regular rhythm.   Pulmonary:      Effort: Pulmonary effort is normal.      Breath sounds: Normal breath sounds.   Abdominal:      General: Bowel sounds are normal. There is no distension.      Palpations: Abdomen is soft.      Tenderness: There is no abdominal tenderness. There is no guarding or rebound.   Musculoskeletal:      Right lower leg: Edema present.      Left lower leg: Edema present.   Lymphadenopathy:      Cervical: No cervical adenopathy.   Skin:     General: Skin is warm.   Neurological:      General: No focal deficit present.      Mental Status: He is alert and oriented to person, place, and time.      Cranial Nerves: No cranial nerve deficit.         Fluids    Intake/Output Summary (Last 24 hours) at 8/19/2020 1554  Last data filed at 8/19/2020 0558  Gross per 24 hour   Intake 510 ml   Output 1700 ml   Net -1190 ml       Laboratory  Recent Labs     08/19/20  0328   WBC 10.4   RBC 5.21   HEMOGLOBIN 15.3   HEMATOCRIT 44.8   MCV 86.0   MCH 29.4   MCHC 34.2   RDW 41.7   PLATELETCT 224   MPV 10.4     Recent Labs     08/17/20  0455 08/18/20  0410 08/19/20  0328   SODIUM 133* 137 138   POTASSIUM 4.0 4.0 3.8   CHLORIDE 93* 97 99   CO2 25 27 26   GLUCOSE 116* 78 93   BUN 58* 56* 41*   CREATININE 1.22 1.23 1.10   CALCIUM 9.1 9.7 9.4     Recent Labs     08/19/20  0328   APTT 30.4   INR 1.04               Imaging  EC-ECHOCARDIOGRAM COMPLETE W/ CONT   Final Result      DX-CHEST-PORTABLE (1 VIEW)   Final Result      1.  There are ill-defined bilateral interstitial opacities with a trace of left pleural fluid. Findings are most consistent  with edema. Pneumonitis is considered possible but less likely.      CL-LEFT HEART CATHETERIZATION WITH POSSIBLE INTERVENTION    (Results Pending)   CL-LEFT HEART CATHETERIZATION WITH POSSIBLE INTERVENTION    (Results Pending)        Assessment/Plan  * Acute systolic congestive heart failure (HCC)- (present on admission)  Assessment & Plan  Toprol, Losartan  Holding diuretics     Cardiomyopathy (HCC)- (present on admission)  Assessment & Plan  Toprol, Losartan    Coronary artery disease involving native coronary artery of native heart without angina pectoris- (present on admission)  Assessment & Plan  [facility] - s/p stenting of posterior descending artery with 2.25 x 24 mm Synergy drug eluting stent with no significant residual stenosis and EL-3 flow, s/p balloon angioplasty and stenting of mid to distal left anterior descending artery with 2 x 30 mm [place] drug eluting stent with no significant restenosis in the stented segment but residual diffuse disease in the distal left anterior descending artery with EL-3 flow. 8/19/2020  [facility] - Two-vessel coronary artery disease, chronic total occlusion of the mid left anterior descending artery, high-grade mid posterior descending artery and ostial posterolateral stenosis. 8/16/2020  ASA, Brilinta, Lipitor, Toprol    Hypomagnesemia- (present on admission)  Assessment & Plan  Follow level    Hypokalemia- (present on admission)  Assessment & Plan  Follow BMP    Hyponatremia- (present on admission)  Assessment & Plan  Follow BMP    HALEY (acute kidney injury) ([facility])- (present on admission)  Assessment & Plan  Follow BMP    Elevated troponin- (present on admission)  Assessment & Plan  Aspirin, Lipitor, Toprol    Essential hypertension- (present on admission)  Assessment & Plan  Losartan and Toprol    Type 2 diabetes mellitus with hyperglycemia, with long-term current use of insulin ([facility])- (present on admission)  Assessment & Plan  Insulin 70/30, SSI    S/P MVR (mitral valve  replacement)- (present on admission)  Assessment & Plan  stable       VTE prophylaxis: Lovenox

## 2020-08-19 NOTE — OP REPORT
Cardiac catheterization report    Procedure date: 8/19/2020    Referring physician: Dr. Tamy Chairez    Pre-operative Diagnosis:  Two-vessel coronary disease (total occlusion of mid left anterior descending artery and 90% mid posterior descending artery stenosis) with new onset systolic heart failure and prior mitral valve replacement felt to be for poor surgical candidate for coronary bypass grafting    Post-operative Diagnosis:   1.  Status post stenting of posterior descending artery with 2.25 x 24 mm Synergy drug eluting stent with no significant residual stenosis and EL-3 flow  2.  Status post balloon angioplasty and stenting of mid to distal left anterior descending artery with 2 x 30 mm [place] drug eluting stent with no significant restenosis in the stented segment but residual diffuse disease in the distal left anterior descending artery with EL-3 flow    Procedure:  1.  Percutaneous intervention of the posterior descending artery of the right coronary artery  2.  Percutaneous coronary intervention of the left anterior descending artery  3.  Supervision of moderate conscious sedation    Complications: None    Description of Procedure:  After informed consent was obtained, the patient was brought to cardiac catheterization laboratory in fasting state.   Howard test was carried out on the right hand and was found to be negative.  Right wrist and right groin were then prepped and drapped in sterile fashion.  Versed and fentanyl were used for conscious sedation.  Lidocaine 2% was used to anesthetize the area.  A 6 Brazilian Terumo sheath was then placed in the right radial artery using Seldinger technique.  A 2.5 mg of verapamil, 100 microgram of nitroglycerine and 3000 units of heparin were administered into the radial sheath.  A 6 Brazilian Amplatz left 0.75 guide was then seated into the right coronary artery and guiding shots were then taken in 2 orthogonal view.  Bivalrudin was then started for  anticoagulation.  A 0.014 BMW wire was then advanced pass the stenosis.  The lesion was dilated with a 2x20 mm balloon.  A 2.25x24 mm drug eluting stent was deployed.  Subsequent angiography showed no significant residual stenosis with EL III flow.   The guide wire was subsequently removed and final angiography was performed.  It confirmed good result. The guide catheter was then removed.     A 6 Serbian EBU 3.5 guide was then seated in the left main.  Guiding shots were taken in multiple views.  BMW wire was first introduced to the occlusion but it would not cross the lesion.   A  150 was subsequently used to cross the occlusion.  After the first couple initial inflations, subsequent angiography reviewed diffuse disease in the distal left anterior descending artery (LAD) which is also rather small in caliber (<2 mm).  We therefore decided to only place a stent into the mid LAD in the vicinity of the area of occlusion.  A 2 x 30 mm [place] drug eluding stent and deployed up to 14 santiago.  Subsequent angiography showed no significant residual stenosis in the stented segment with brisk antegrade flow.  The guidewire, the guide catheter and tThe radial sheath were subsequently removed.  Hemostasis was obtained using a Terumo TR wrist band.  The patient was then given a loading dose of brillinta.  Bivalirudin was subsequently reduced to low dose infusion.  The patient tolerated procedure well and left cardiac catheterization laboratory in stable condition.    I supervised monitoring the patient under moderate conscious sedation beginning at 10:27 AM until the end of the case at 11:34 AM.      Findings:    Prior to intervention;    Left anterior descending artery was occluded in the midportion after giving rise to relatively large early takeoff first diagonal branch.    The right coronary artery had eccentric 90% stenosis in the proximal portion of a medium-sized posterior descending artery.     After intervention, there  was 0% residual stenosis in stented segments of the left anterior descending artery and the posterior descending artery artery with EL III flow.    As mentioned above, the distal left anterior descending artery has diffuse disease but appeared rather small under 2 mm in diameter which is unlikely to remain patent with intervention long-term.  Most of the anterior wall is also likely nonviable.      Plans;  Dual antiplatelet therapy for one year.   Continue low-dose bivalirudin infusion for 4 hours.   Optimize medical therapy  Aggressive risk factor modification.  Limit right wrist movement for 24 hours.        Tea Corley M.D.

## 2020-08-19 NOTE — PROGRESS NOTES
Cardiology Follow Up Progress Note    Date of Service  8/19/2020    Attending Physician  Pranay Interiano D.O.    Chief Complaint   Volume overload    HPI  Chino Deluca is a 65 y.o. male admitted 8/12/2020 with volume overload (CRAIN, orthopnea, PND, weight gain). Found to have a severely reduced, EF 25%. Work up included a coronary angiogram which showed  of mid LAD and high grade stenosis of posterior descending artery and posterolateral branch of LCx.     Other history includes S/P Mitral valve replacement 2016, HTN, HLD, IDDM.     Interim Events  The iPAD  was used for this visit.  Seen prior to cath, denies any chest pressure/pain, dyspnea, leg swelling, orthopnea. He complains of some sinus pain with rhinorrhea, possibly from the nasal cannula.      No questions regarding plan for procedure today.     Review of Systems    Review of Systems   Constitutional: Negative for chills, fatigue and fever.   HENT: Positive for rhinorrhea and sinus pain.    Respiratory: Negative for chest tightness and shortness of breath.    Cardiovascular: Negative for chest pain, palpitations and leg swelling.   Genitourinary: Negative for difficulty urinating.   Neurological: Negative for dizziness and light-headedness.       Vital signs in last 24 hours  Temp:  [35.6 °C (96 °F)-37.1 °C (98.8 °F)] 35.8 °C (96.5 °F)  Pulse:  [61-89] 78  Resp:  [18] 18  BP: (113-140)/(62-83) 114/70  SpO2:  [90 %-94 %] 90 %    Physical Exam  Physical Exam  HENT:      Head: Normocephalic and atraumatic.   Cardiovascular:      Rate and Rhythm: Normal rate and regular rhythm.      Heart sounds: No gallop.       Comments: Mitral click  Pulmonary:      Breath sounds: Rales (right base) present.   Abdominal:      Palpations: Abdomen is soft.   Musculoskeletal:      Right lower leg: No edema.      Left lower leg: No edema.   Skin:     General: Skin is warm and dry.   Psychiatric:         Judgment: Judgment normal.         Lab  Review  Lab Results   Component Value Date/Time    WBC 10.4 08/19/2020 03:28 AM    RBC 5.21 08/19/2020 03:28 AM    HEMOGLOBIN 15.3 08/19/2020 03:28 AM    HEMATOCRIT 44.8 08/19/2020 03:28 AM    MCV 86.0 08/19/2020 03:28 AM    MCH 29.4 08/19/2020 03:28 AM    MCHC 34.2 08/19/2020 03:28 AM    MPV 10.4 08/19/2020 03:28 AM      Lab Results   Component Value Date/Time    SODIUM 138 08/19/2020 03:28 AM    POTASSIUM 3.8 08/19/2020 03:28 AM    CHLORIDE 99 08/19/2020 03:28 AM    CO2 26 08/19/2020 03:28 AM    GLUCOSE 93 08/19/2020 03:28 AM    BUN 41 (H) 08/19/2020 03:28 AM    CREATININE 1.10 08/19/2020 03:28 AM      Lab Results   Component Value Date/Time    ASTSGOT 44 08/12/2020 01:35 PM    ALTSGPT 51 (H) 08/12/2020 01:35 PM     Lab Results   Component Value Date/Time    CHOLSTRLTOT 183 05/07/2020 03:09 AM    LDL see below 05/07/2020 03:09 AM    HDL 31 (A) 05/07/2020 03:09 AM    TRIGLYCERIDE 587 (H) 05/07/2020 03:09 AM    TROPONINT 36 (H) 08/13/2020 12:29 PM       No results for input(s): NTPROBNP in the last 72 hours.    Cardiac Imaging and Procedures Review    Echocardiogram:  8/13/20  CONCLUSIONS  No prior study is available for comparison.   Contrast was used to enhance visualization of the endocardial border.  Moderate concentric left ventricular hypertrophy.  Left ventricular ejection fraction is visually estimated to be 25%.  Akinesis of the mid to distal inferior wall, distal anterior wall , and   mid to distal septum.  [place] is mildly dyskinetic without thrombus.  A reliable estimation of diastolic function cannot be made due to   mitral valve disease.  Known mitral valve replacement which is functioning normally with   appropriate transvalvular gradient.  Mean transvalvular gradient is 4  mmHg at a heart rate of 83 BPM.  Mild aortic insufficiency.  Aortic sclerosis without stenosis.  Unable to estimate pulmonary artery pressure due to an inadequate   tricuspid regurgitant jet.  A reliable estimation of diastolic  function cannot be made due to   mitral valve disease.  Normal pericardium without effusion.  Results via Gill text to ordering physician at 08:42 hrs    Cardiac Catheterization:  8/16/20  ANGIOGRAM:  1.  Left main coronary artery:  Left main coronary artery is a moderate   length, large-caliber vessel, free of disease.  2.  Left anterior descending artery:  Left anterior descending artery is   occluded at the midportion.  There are no collateral filling of the left   anterior descending.  3.  Left circumflex artery:  Left circumflex artery is a nondominant,   moderate-caliber vessel with small obtuse marginal branches.  4.  Right coronary artery:  Right coronary artery is a dominant, large-caliber   vessel with proximal luminal irregularities of 20%.  Distally, there is a   long, moderate-caliber posterior descending artery with mid concentric 99% stenosis.  There is a posterolateral branch, moderate-caliber with proximal concentric 90% stenosis.     IMPRESSION:  1.  Two-vessel coronary artery disease, chronic total occlusion of the mid left anterior descending artery, high-grade mid posterior descending artery and ostial posterolateral stenosis.  2.  Reduced left ventricular systolic function with ejection fraction of 20%.  3.  Elevated left ventricular end-diastolic pressure.     RECOMMENDATIONS:  Recommend CT surgery consultation.    [facility] 8/19/20     Post-operative Diagnosis:   1.  Status post stenting of posterior descending artery with 2.25 x 24 mm Synergy drug eluting stent with no significant residual stenosis and EL-3 flow  2.  Status post balloon angioplasty and stenting of mid to distal left anterior descending artery with 2 x 30 mm [place] drug eluting stent with no significant restenosis in the stented segment but residual diffuse disease in the distal left anterior descending artery with EL-3 flow      Assessment/Plan  Coronary Artery Disease  - S/P CARLOS to PDA and CARLOS to mid-distal LAD (residual  disease in distal LAD)  - aspirin 81mg plus ticagrelor 90mg bid  - lipitor 40mg  - inc Metop SR to 75mg this evening    HFrEF, new diagnosis, Stage C  Class III on admission, now with NYHA II symptoms  - NTproBNP on admit 1214  -  hold diuretics for today, weight today 186, admit weight 195lbs  - losartan 25mg   - Metop SR 50mg  - Will add Ramy to regimen prior to discharge admission, wait until after contrast load   - suspect ischemic cardiomyopathy given his wall motion abnormalities on echo. After revascularization will treat with GDMT for 3 months and re-eval EF for ICD placement at that time.     Mitral Valve Replacement  - Functioning well, mean transvalvular gradient is 4mmHg at a heart rate of 83 BPM.  - cont aspirin 81mg    IDDM, uncontrolled  - A1C 10, down from 17, continue with strict glucose control, would from addition on SGLT2 inhibitor  - high intensity statin    PCI today, start Ramy after contrast load from cath    Staffed with Dr. Chairez

## 2020-08-19 NOTE — PROGRESS NOTES
Pt arrived to unit @1155. Pts cath site is CDI with 13mL of air in the balloon. Post operative vitals in place. Resting comfortably in bed now. Placed back on tele, monitor room notified.

## 2020-08-19 NOTE — PROGRESS NOTES
T band removed, pressure dressing in place. Site is CDI. No sign of active bleeding or hematoma formed.

## 2020-08-19 NOTE — CARE PLAN
Problem: Communication  Goal: The ability to communicate needs accurately and effectively will improve  Outcome: PROGRESSING AS EXPECTED  Intervention: Use communication aids and/or /Language Line as appropriate  Note: Used interpretation device to communicate with patient regarding patient education, consent, and needs. All questions and concerns answered at this time.     Problem: Knowledge Deficit  Goal: Knowledge of disease process/condition, treatment plan, diagnostic tests, and medications will improve  Outcome: PROGRESSING AS EXPECTED  Intervention: Explain information regarding disease process/condition, treatment plan, diagnostic tests, and medications and document in education  Note: Pt educated regarding current disease process and scheduled cath procedure. Pt aware of risks and benefits and demonstrates verbal understanding of information provided

## 2020-08-19 NOTE — PROGRESS NOTES
Pt is off the floor for cath procedure with transport, on a zol with flex nurse. Monitor room notified.

## 2020-08-19 NOTE — CARE PLAN
Problem: Communication  Goal: The ability to communicate needs accurately and effectively will improve  Intervention: Educate patient and significant other/support system about the plan of care, procedures, treatments, medications and allow for questions  Flowsheets (Taken 8/18/2020 2144)  Pt & Family Have Been Educated on Methods Available to Report Concerns Related to Care, Treatment, Services, and Patient Safety Issues: Yes  Note: Cath in AM

## 2020-08-19 NOTE — PROGRESS NOTES
Report received. Patient oriented x4. Pain level 0 out of 10. educated patient of npo status. Denies SOB. Fall risk interventions in place, bed in low position,pt upself.. Assessment completed.

## 2020-08-20 VITALS
OXYGEN SATURATION: 93 % | BODY MASS INDEX: 29.62 KG/M2 | DIASTOLIC BLOOD PRESSURE: 81 MMHG | TEMPERATURE: 96.6 F | HEART RATE: 73 BPM | HEIGHT: 66 IN | RESPIRATION RATE: 18 BRPM | WEIGHT: 184.3 LBS | SYSTOLIC BLOOD PRESSURE: 146 MMHG

## 2020-08-20 LAB
ANION GAP SERPL CALC-SCNC: 13 MMOL/L (ref 7–16)
BASOPHILS # BLD AUTO: 0.5 % (ref 0–1.8)
BASOPHILS # BLD: 0.05 K/UL (ref 0–0.12)
BUN SERPL-MCNC: 24 MG/DL (ref 8–22)
CALCIUM SERPL-MCNC: 9.3 MG/DL (ref 8.5–10.5)
CHLORIDE SERPL-SCNC: 99 MMOL/L (ref 96–112)
CO2 SERPL-SCNC: 25 MMOL/L (ref 20–33)
CREAT SERPL-MCNC: 0.95 MG/DL (ref 0.5–1.4)
EOSINOPHIL # BLD AUTO: 0.6 K/UL (ref 0–0.51)
EOSINOPHIL NFR BLD: 5.8 % (ref 0–6.9)
ERYTHROCYTE [DISTWIDTH] IN BLOOD BY AUTOMATED COUNT: 41.5 FL (ref 35.9–50)
GLUCOSE BLD-MCNC: 118 MG/DL (ref 65–99)
GLUCOSE BLD-MCNC: 119 MG/DL (ref 65–99)
GLUCOSE SERPL-MCNC: 104 MG/DL (ref 65–99)
HCT VFR BLD AUTO: 43.2 % (ref 42–52)
HGB BLD-MCNC: 14.8 G/DL (ref 14–18)
IMM GRANULOCYTES # BLD AUTO: 0.03 K/UL (ref 0–0.11)
IMM GRANULOCYTES NFR BLD AUTO: 0.3 % (ref 0–0.9)
LYMPHOCYTES # BLD AUTO: 2.12 K/UL (ref 1–4.8)
LYMPHOCYTES NFR BLD: 20.5 % (ref 22–41)
MAGNESIUM SERPL-MCNC: 1.8 MG/DL (ref 1.5–2.5)
MCH RBC QN AUTO: 29.4 PG (ref 27–33)
MCHC RBC AUTO-ENTMCNC: 34.3 G/DL (ref 33.7–35.3)
MCV RBC AUTO: 85.9 FL (ref 81.4–97.8)
MONOCYTES # BLD AUTO: 1.73 K/UL (ref 0–0.85)
MONOCYTES NFR BLD AUTO: 16.7 % (ref 0–13.4)
NEUTROPHILS # BLD AUTO: 5.82 K/UL (ref 1.82–7.42)
NEUTROPHILS NFR BLD: 56.2 % (ref 44–72)
NRBC # BLD AUTO: 0 K/UL
NRBC BLD-RTO: 0 /100 WBC
PLATELET # BLD AUTO: 232 K/UL (ref 164–446)
PMV BLD AUTO: 10.8 FL (ref 9–12.9)
POTASSIUM SERPL-SCNC: 3.7 MMOL/L (ref 3.6–5.5)
RBC # BLD AUTO: 5.03 M/UL (ref 4.7–6.1)
SODIUM SERPL-SCNC: 137 MMOL/L (ref 135–145)
WBC # BLD AUTO: 10.4 K/UL (ref 4.8–10.8)

## 2020-08-20 PROCEDURE — A9270 NON-COVERED ITEM OR SERVICE: HCPCS | Performed by: STUDENT IN AN ORGANIZED HEALTH CARE EDUCATION/TRAINING PROGRAM

## 2020-08-20 PROCEDURE — 700111 HCHG RX REV CODE 636 W/ 250 OVERRIDE (IP): Performed by: HOSPITALIST

## 2020-08-20 PROCEDURE — 99239 HOSP IP/OBS DSCHRG MGMT >30: CPT | Performed by: FAMILY MEDICINE

## 2020-08-20 PROCEDURE — A9270 NON-COVERED ITEM OR SERVICE: HCPCS | Performed by: INTERNAL MEDICINE

## 2020-08-20 PROCEDURE — 82962 GLUCOSE BLOOD TEST: CPT

## 2020-08-20 PROCEDURE — 700102 HCHG RX REV CODE 250 W/ 637 OVERRIDE(OP): Performed by: STUDENT IN AN ORGANIZED HEALTH CARE EDUCATION/TRAINING PROGRAM

## 2020-08-20 PROCEDURE — 85025 COMPLETE CBC W/AUTO DIFF WBC: CPT

## 2020-08-20 PROCEDURE — 83735 ASSAY OF MAGNESIUM: CPT

## 2020-08-20 PROCEDURE — 700102 HCHG RX REV CODE 250 W/ 637 OVERRIDE(OP): Performed by: INTERNAL MEDICINE

## 2020-08-20 PROCEDURE — 36415 COLL VENOUS BLD VENIPUNCTURE: CPT

## 2020-08-20 PROCEDURE — 80048 BASIC METABOLIC PNL TOTAL CA: CPT

## 2020-08-20 RX ORDER — METOPROLOL SUCCINATE 25 MG/1
75 TABLET, EXTENDED RELEASE ORAL EVERY EVENING
Qty: 30 TAB | Refills: 11 | Status: SHIPPED | OUTPATIENT
Start: 2020-08-20 | End: 2022-08-08

## 2020-08-20 RX ORDER — SPIRONOLACTONE 25 MG/1
12.5 TABLET ORAL
Status: DISCONTINUED | OUTPATIENT
Start: 2020-08-20 | End: 2020-08-20 | Stop reason: HOSPADM

## 2020-08-20 RX ORDER — ASPIRIN 81 MG/1
81 TABLET ORAL DAILY
Qty: 30 TAB | Refills: 11 | Status: SHIPPED | OUTPATIENT
Start: 2020-08-21

## 2020-08-20 RX ORDER — LOSARTAN POTASSIUM 50 MG/1
50 TABLET ORAL EVERY EVENING
Status: DISCONTINUED | OUTPATIENT
Start: 2020-08-20 | End: 2020-08-20 | Stop reason: HOSPADM

## 2020-08-20 RX ORDER — ATORVASTATIN CALCIUM 80 MG/1
80 TABLET, FILM COATED ORAL EVERY EVENING
Qty: 30 TAB | Refills: 11 | Status: ON HOLD | OUTPATIENT
Start: 2020-08-20 | End: 2021-05-16

## 2020-08-20 RX ORDER — LOSARTAN POTASSIUM 50 MG/1
50 TABLET ORAL EVERY EVENING
Qty: 30 TAB | Refills: 11 | Status: SHIPPED | OUTPATIENT
Start: 2020-08-20 | End: 2021-05-19 | Stop reason: SDUPTHER

## 2020-08-20 RX ORDER — SPIRONOLACTONE 25 MG/1
12.5 TABLET ORAL DAILY
Qty: 30 TAB | Refills: 11 | Status: ON HOLD | OUTPATIENT
Start: 2020-08-20 | End: 2021-05-16 | Stop reason: SDUPTHER

## 2020-08-20 RX ADMIN — INSULIN HUMAN 15 UNITS: 100 INJECTION, SUSPENSION SUBCUTANEOUS at 08:51

## 2020-08-20 RX ADMIN — TICAGRELOR 90 MG: 90 TABLET ORAL at 05:44

## 2020-08-20 RX ADMIN — ENOXAPARIN SODIUM 40 MG: 40 INJECTION SUBCUTANEOUS at 05:44

## 2020-08-20 RX ADMIN — TICAGRELOR 90 MG: 90 TABLET ORAL at 01:02

## 2020-08-20 RX ADMIN — ASPIRIN 81 MG: 81 TABLET, COATED ORAL at 05:44

## 2020-08-20 ASSESSMENT — FIBROSIS 4 INDEX: FIB4 SCORE: 1.73

## 2020-08-20 ASSESSMENT — ENCOUNTER SYMPTOMS
STRIDOR: 0
CHOKING: 0
APNEA: 0
SHORTNESS OF BREATH: 0
WHEEZING: 0
CHEST TIGHTNESS: 0
COUGH: 0

## 2020-08-20 NOTE — PROGRESS NOTES
Assumed care of patient at 0715, received bedside report from night shift RN. Bed is locked and in lowest position with call light within reach. Treaded socks in place. Patient updated on plan of care, no complaints or pain at this time. White board updated. Pt A&O4. Patient's breathing pattern is unlabored. Tele monitor in place and cardiac rhythm being monitored. Right radial cath site is CDI. All needs met at this time.

## 2020-08-20 NOTE — PROGRESS NOTES
ANO4. Patient given discharge instructions regarding follow up appointment, diet, activity, prescriptions, and when to call a provider using the iPad interpretation services. Patient demonstrated verbal understanding of information given. Tele box off, IV discharged, transported in wheel chair off of the unit with all belongings.

## 2020-08-20 NOTE — CARE PLAN
Problem: Safety  Goal: Will remain free from injury  Outcome: MET  Goal: Will remain free from falls  Outcome: MET     Problem: Communication  Goal: The ability to communicate needs accurately and effectively will improve  Outcome: MET     Problem: Pain Management  Goal: Pain level will decrease to patient's comfort goal  Outcome: MET     Problem: Infection  Goal: Will remain free from infection  Outcome: MET     Problem: Venous Thromboembolism (VTW)/Deep Vein Thrombosis (DVT) Prevention:  Goal: Patient will participate in Venous Thrombosis (VTE)/Deep Vein Thrombosis (DVT)Prevention Measures  Outcome: MET

## 2020-08-20 NOTE — DISCHARGE PLANNING
Anticipated Discharge Disposition: Home.    Action: Spoke with [facility] pharmacy on [address] They received the Brilinta Rx, it is ready for the patient with a $35.00 copay. Updated Pt.    Barriers to Discharge: None.    Plan: D/C today.

## 2020-08-20 NOTE — DISCHARGE INSTRUCTIONS
Discharge Instructions    Discharged to home by car with relative. Discharged via wheelchair, hospital escort: Yes.  Special equipment needed: Not Applicable    Be sure to schedule a follow-up appointment with your primary care doctor or any specialists as instructed.     Discharge Plan:   Diet Plan: Discussed  Activity Level: Discussed  Confirmed Symptoms Management: Discussed  Medication Reconciliation Updated: Yes    I understand that a diet low in cholesterol, fat, and sodium is recommended for good health. Unless I have been given specific instructions below for another diet, I accept this instruction as my diet prescription.   Other diet: Cardiac/Diabetic Diet    Special Instructions:   HF Patient Discharge Instructions  · Monitor your weight daily, and maintain a weight chart, to track your weight changes.   · Activity as tolerated, unless your Doctor has ordered otherwise. Other activity order: As tolerated, no weight lifting with right arm for a week.  · Follow a low fat, low cholesterol, low salt diet unless instructed otherwise by your Doctor. Read the labels on the back of food products and track your intake of fat, cholesterol and salt.   · Fluid Restriction No. If a Fluid Restriction has been ordered by your Doctor, measure fluids with a measuring cup to ensure that you are not exceeding the restriction.   · No smoking.  · Oxygen No. If your Doctor has ordered that you wear Oxygen at home, it is important to wear it as ordered.  · Did you receive an explanation from staff on the importance of taking each of your medications and why it is necessary to keep taking them unless your doctor says to stop? Yes  · Were all of your questions answered about how to manage your heart failure and what to do if you have increased signs and symptoms after you go home? Yes  · Do you feel like your heart failure care team involved you in the care treatment plan and allowed you to make decisions regarding your care  while in the hospital and addressed any discharge needs you might have? Yes    See the educational handout provided at discharge for more information on monitoring your daily weight, activity and diet. This also explains more about Heart Failure, symptoms of a flare-up and some of the tests that you have undergone.     Warning Signs of a Flare-Up include:  · Swelling in the ankles or lower legs.  · Shortness of breath, while at rest, or while doing normal activities.   · Shortness of breath at night when in bed, or coughing in bed.   · Requiring more pillows to sleep at night, or needing to sit up at night to sleep.  · Feeling weak, dizzy or fatigued.     When to call your Doctor:  · Call [facility] seven days a week from 8:00 a.m. to 8:00 p.m. for medical questions [phone].  · Call your Primary Care Physician or Cardiologist if:   1. You experience any pain radiating to your jaw or neck.  2. You have any difficulty breathing.  3. You experience weight gain of 3 lbs in a day or 5 lbs in a week.   4. You feel any palpitations or irregular heartbeats.  5. You become dizzy or lose consciousness.   If you have had an angiogram or had a pacemaker or AICD placed, and experience:  1. Bleeding, drainage or swelling at the surgical / puncture site.  2. Fever greater than 100.0 F  3. Shock from internal defibrillator.  4. Cool and / or numb extremities.      · Is patient discharged on Warfarin / Coumadin?   No     Depression / Suicide Risk    As you are discharged from this [facility], it is important to learn how to keep safe from harming yourself.    Recognize the warning signs:  · Abrupt changes in personality, positive or negative- including increase in energy   · Giving away possessions  · Change in eating patterns- significant weight changes-  positive or negative  · Change in sleeping patterns- unable to sleep or sleeping all the time   · Unwillingness or inability to  communicate  · Depression  · Unusual sadness, discouragement and loneliness  · Talk of wanting to die  · Neglect of personal appearance   · Rebelliousness- reckless behavior  · Withdrawal from people/activities they love  · Confusion- inability to concentrate     If you or a loved one observes any of these behaviors or has concerns about self-harm, here's what you can do:  · Talk about it- your feelings and reasons for harming yourself  · Remove any means that you might use to hurt yourself (examples: pills, rope, extension cords, firearm)  · Get professional help from the community (Mental Health, Substance Abuse, psychological counseling)  · Do not be alone:Call your Safe Contact- someone whom you trust who will be there for you.  · Call your local CRISIS HOTLINE 385-3414 or 912-107-7459  · Call your local Children's Mobile Crisis Response Team [place] (189) 142-8190 or www.[employer]  · Call the toll free National Suicide Prevention Hotlines   · National Suicide Prevention Lifeline 494-309-ONYR (0743)  · National [employer] Line Network 800-SUICIDE (956-2522)      Cuidado del sitio del radio  Radial Site Care    Esta hoja le proporciona información sobre cómo cuidarse después del procedimiento. El médico también podrá darle instrucciones más específicas. Comuníquese con casanova médico si tiene problemas o preguntas.  ¿Qué puedo esperar después del procedimiento?  Después del procedimiento, es común tener los siguientes síntomas:  · Moretones y dolor a la palpación en el lugar de inserción del catéter.  Siga estas indicaciones en casanova casa:  Medicamentos  · [place] los medicamentos de venta zana y los recetados solamente doris se lo haya indicado el médico.  Cuidados de la myra de la inserción  · Siga las indicaciones del médico acerca de los cuidados del lugar de la inserción. Juliane lo siguiente:  ? Lávese las casi con agua y jabón antes de cambiar las vendas (vendajes). Use desinfectante para casi si no dispone de  agua y jabón.  ? Cambie los vendajes doris se lo haya indicado el médico.  ? No retire los puntos (suturas), la goma para cerrar la piel o las tiras adhesivas. Es posible que estos cierres cutáneos deban permanecer en la piel rodrigo 2 semanas o más. Si los bordes de las tiras adhesivas empiezan a despegarse y enroscarse, puede recortar los que están sueltos. No retire las tiras adhesivas por completo a menos que el médico se lo indique.  · Controle el lugar de inserción todos los días para descartar signos de infección. Esté atento a los siguientes signos:  ? Dolor, hinchazón o enrojecimiento.  ? Líquido o ivon.  ? Pus o mal olor.  ? Calor.  · No tome will de inmersión, no nade ni use el jacuzzi hasta que el médico lo autorice.  · Puede ducharse entre 24 y 48 horas después del procedimiento o doris se lo haya indicado el médico.  ? Retire el vendaje y lave suavemente el lugar de la inserción con agua y jabón común.  ? Seque hebert el área con jason toalla limpia dando golpecitos.  ? No se frote el lugar. Al hacerlo, puede ocasionar sangrado.  · No se aplique talcos ni lociones en el lugar.  Actividad    · Rodrigo las 24 horas posteriores al procedimiento o según las indicaciones del médico:  ? No flexione ni doble el brazo afectado.  ? No empuje ni jale objetos pesados con el brazo afectado.  ? No conduzca a casanova casa desde la clínica o el hospital. Puede conducir 24 horas después del procedimiento, a menos que el médico le haya indicado lo contrario.  ? No opere maquinaria ni herramientas eléctricas.  · No levante ningún objeto que pese más de 10 libras (4,5 kg) o el límite de peso que le hayan indicado, hasta que el médico le diga que puede hacerlo.  · Pregúntele al médico cuándo puede hacer lo siguiente:  ? Regresar a la escuela o al trabajo.  ? Reanudar las actividades físicas o los deportes que practica habitualmente.  ? Reanudar la actividad sexual.  Instrucciones generales  · Si el lugar de la inserción del  catéter comienza a sangrar, levante el brazo y ejerza presión en el lugar con firmeza. Si la hemorragia no se detiene, pida ayuda de inmediato. [place] es jason emergencia médica.  · Si regresó a casanova casa el mismo día que se realizó el procedimiento, un adulto responsable debe acompañarlo rodrigo las primeras 24 horas posteriores a la llegada a casanova casa.  · Concurra a todas las visitas de control doris se lo haya indicado el médico. [place] es importante.  Comuníquese con un médico si:  · Tiene fiebre.  · Tiene enrojecimiento, hinchazón o jason secreción amarillenta alrededor del lugar de la inserción.  Solicite ayuda de inmediato si:  · Tiene un dolor que no es habitual en el sitio del radio.  · La myra de inserción del catéter se hincha muy rápido.  · La myra de inserción sangra y el sangrado no se detiene cuando ejerce presión sierra en la myra.  · El brazo o la mano se le ponen pálidos, fríos o siente hormigueo o adormecimiento.  Estos síntomas pueden representar un problema grave que constituye jason emergencia. No espere hasta que los síntomas desaparezcan. Solicite atención médica de inmediato. Comuníquese con el servicio de emergencias de casanova localidad (911 en los [place]). No conduzca por carmelita propios medios hasta el hospital.  Resumen  · Después del procedimiento, es frecuente tener moretones y sentir dolor a la palpación en el lugar de inserción del catéter.  · Siga las instrucciones del médico acerca del cuidado de la herida en el lugar de inserción radial. Observe la herida todos los días para descartar signos de infección.  · No levante ningún objeto que pese más de 10 libras (4,5 kg) o el límite de peso que le hayan indicado, hasta que el médico le diga que puede hacerlo.  Esta información no tiene doris fin reemplazar el consejo del médico. Asegúrese de hacerle al médico cualquier pregunta que tenga.  Document Released: 04/13/2012 Document Revised: 02/25/2019 Document Reviewed: 02/25/2019  Elsevier Patient  Education © 2020 [employer]vier Inc.    Diagnóstico de insuficiencia cardíaca  Heart Failure, Diagnosis    La insuficiencia cardíaca es jason afección en la que el corazón tiene dificultad para bombear la ivon porque se ha vuelto rígido y débil. [place] implica que el corazón no bombea la ivon de manera eficiente para que el organismo pueda mantenerse jono. En algunas personas con insuficiencia cardíaca, el líquido puede volver a los pulmones. También puede nando hinchazón (edema) en la parte inferior de las piernas. La insuficiencia cardíaca por lo general es jason enfermedad a aishwarya plazo (crónica). Es importante que se cuide mucho y que siga el plan de tratamiento que le indique casanova médico.  ¿Cuáles son las causas?  Esta afección puede ser causada por lo siguiente:  · Presión arterial donato (hipertensión arterial). La hipertensión hace que el músculo cardíaco trabaje más de lo normal. [place] hace que el corazón se vuelva rígido o débil.  · Arteriopatía coronaria (AC). La arteriopatía coronaria es la acumulación de colesterol y grasa (placa) en las arterias del corazón.  · Infarto de miocardio (ataque al corazón). [place] lesiona el músculo cardíaco y hace que el corazón tenga dificultad para bombear la ivon.  · Válvulas cardíacas anormales. Las válvulas no se abren y cierran correctamente, lo que obliga al corazón a bombear mucho más para mantener el flujo de ivon.  · Enfermedad del músculo cardíaco (miocardiopatía o miocarditis). Es el daño al músculo cardíaco. Puede aumentar el riesgo de insuficiencia cardíaca.  · Enfermedad pulmonar. El corazón hace más esfuerzo cuando los pulmones no están sanos.  · Ritmo cardíaco anormal. [place] puede provocar insuficiencia cardíaca.  ¿Qué incrementa el riesgo?  El riesgo de insuficiencia cardíaca aumenta con la edad. También es más probable que esta afección se manifieste en las personas que:  · Tienen sobrepeso.  · Son hombres.  · Fuman o mastican tabaco.  · Consumen alcohol o drogas  ilegales en forma excesiva.  · Felix tomado medicamentos que pueden dañar el corazón, doris los medicamentos de quimioterapia.  · Tienen diabetes.  · Tienen ritmo cardíaco anormal.  · Tienen problemas de tiroides.  · Tienen un bajo recuento de glóbulos rojos (anemia).  ¿Cuáles son los signos o los síntomas?  Los síntomas de esta afección incluyen:  · Falta de aire al realizar actividades, doris subir escaleras.  · Marta tos que no desaparece.  · Hinchazón de los pies, los tobillos, las piernas o el abdomen.  · Pérdida de peso sin motivo.  · Dificultad para respirar al estar acostado (ortopnea).  · Despertarse con la necesidad de sentarse y horacio aire.  · Latidos cardíacos rápidos.  · Cansancio (fatiga) y pérdida de la energía.  · Mareos, o sensación de desmayo o desvanecimiento.  · Pérdida del apetito.  · Náuseas.  · Levantarse con más frecuencia rodrigo la noche para ir a orinar (nicturia).  · Confusión.  ¿Cómo se diagnostica?  Esta afección se diagnostica en función de lo siguiente:  · Los antecedentes médicos, los síntomas y un examen físico.  · Las pruebas de diagnóstico pueden incluir:  ? Ecocardiograma.  ? Electrocardiograma (ECG).  ? Radiografía de tórax.  ? Análisis de ivon.  ? Ergometría.  ? Gammagrafía con radionúclido.  ? Cateterismo cardíaco y angiografía.  ¿Cómo se trata?  El tratamiento de esta afección está destinado al control de los síntomas de la insuficiencia cardíaca.  Medicamentos  El tratamiento puede incluir medicamentos para tratar lo siguiente:  · Relajar (dilatar) los vasos sanguíneos y ayudar a disminuir la presión arterial. Estos medicamentos se llaman IECA (inhibidores de la enzima convertidora de angiotensina) y BRA (bloqueadores de los receptores de angiotensina).  · Hacer que los riñones eliminen la sal y el agua de la ivon a través de la orina (diuréticos).  · Mejorar la fuerza del músculo cardíaco y evitar que el corazón swathi demasiado rápido (betabloqueantes).  · Aumentar la fuerza  de los latidos cardíacos (digoxina).  Cambios hacia un estilo de flo saludable         El tratamiento también puede implicar hacer cambios saludables en el estilo de flo, por ejemplo, los siguientes:  · Lograr un peso saludable y mantenerlo.  · Dejar de fumar o mascar tabaco.  · Consumir alimentos cardiosaludables.  · Limitar o evitar el alcohol.  · Dejar de consumir drogas ilegales.  · Estar físicamente activo.    Otros tratamientos  Otros tratamientos pueden incluir los siguientes:  · Procedimientos para abrir las arterias obstruidas o reparar las válvulas dañadas.  · Implantar un marcapasos para mejorar el funcionamiento del corazón (tratamiento de resincronización cardíaca).  · Implantar un dispositivo para tratar problemas graves de ritmos cardíacos anormales (desfibrilador cardioversor implantable o DCI).  · Colocar un dispositivo para mejorar la capacidad de bombeo del corazón (dispositivo de asistencia ventricular izquierda o MAG).  · Recibir un corazón jono de un donante (trasplante cardíaco). [place] se realiza después de que otros tratamientos no blevins sido exitosos.  Siga estas indicaciones en casanova casa:  · Mantenga otras enfermedades bajo control doris se lo haya indicado el médico. Estas pueden incluir hipertensión, diabetes, enfermedad tiroidea o ritmos cardíacos anormales.  · Reciba asesoramiento y apoyo si lo necesita. Aprenda tanto doris pueda sobre la insuficiencia cardíaca.  · Concurra a todas las visitas de seguimiento doris se lo haya indicado el médico. [place] es importante.  Resumen  · La insuficiencia cardíaca es jason afección en la que el corazón tiene dificultad para bombear la ivon porque se ha vuelto rígido y débil.  · La causa de esta afección es la presión arterial donato y otras enfermedades del corazón y los pulmones.  · Los síntomas de esta afección incluyen dificultad para respirar, cansancio (fatiga), náuseas e hinchazón de los pies, los tobillos, las piernas o el abdomen.  · El tratamiento  de esta afección puede incluir medicamentos, cambios en el estilo de flo y cirugía.  · Mantenga otras enfermedades bajo control doris se lo haya indicado el médico.  Esta información no tiene doris fin reemplazar el consejo del médico. Asegúrese de hacerle al médico cualquier pregunta que tenga.  Document Released: 12/18/2006 Document Revised: 04/09/2020 Document Reviewed: 04/09/2020  [employer] Patient Education © 2020 [employer]      Plan de alimentación para la insuficiencia cardíaca  Heart Failure Eating Plan  La insuficiencia cardíaca, también llamada insuficiencia cardíaca congestiva, ocurre cuando el corazón no bombea la ivon lo suficientemente hebert doris para satisfacer la necesidad del organismo de ivon shahida en oxígeno. La insuficiencia cardíaca es jason enfermedad de larga duración (crónica). Vivir con insuficiencia cardíaca puede ser difícil. Sin embargo, seguir las instrucciones del médico acerca de mantener un estilo de flo saludable y trabajar con un especialista en alimentación y nutrición (nutricionista) para elegir los alimentos adecuados puede mejorar los síntomas.  ¿Cuáles son algunos consejos para seguir jayde plan?  Pautas generales  · No consuma más de 2,300 mg de sal (sodio) por día. La cantidad de sodio que le recomienden puede ser más baja, según casanova afección.  · Mantenga un peso corporal saludable, según las indicaciones. Pregúntele al médico cuál es un peso saludable para usted.  ? Controle casanova peso todos los días.  ? Trabaje con el médico y el nutricionista para elaborar un plan que sea adecuado para usted con el objetivo de adelgazar o mantener casanova peso actual.  · Limite la cantidad de líquido que rafat. Pregúntele al médico o al nutricionista cuánto líquido puede beber por día.  · Limite o evite el consumo de alcohol doris se lo hayan indicado el médico o el nutricionista.  Tanvi las etiquetas de los alimentos  · Verifique la cantidad de sodio por porción en las etiquetas de los alimentos.  "Elija alimentos que contengan menos de 140 mg (miligramos) de sodio por porción.  · Atnvi las etiquetas de los alimentos para conocer la cantidad de calorías por porción. [place] es importante si debe limitar la ingesta diaria de calorías para adelgazar.  · Consulte las etiquetas de los alimentos para conocer el tamaño de las porciones. Si come más de jason porción, consumirá más sodio y calorías que lo indicado en la etiqueta.  · Busque los alimentos etiquetados doris \"sin sodio\", \"muy bajo en sodio\" o \"bajo en sodio\".  ? Los alimentos etiquetados doris \"sodio reducido\" o \"ligeramente nikolay\" pueden contener más sodio que el recomendado para usted.  Al cocinar  · Evite agregar sal cuando cocine. Consulte al médico o al nutricionista antes de usar sustitutos de la sal.  · Condimente la comida con condimentos, especias o hierbas sin sal. Tanvi la etiqueta de las mezclas de condimentos para asegurarse de que no contengan sal.  · Cocine con aceites cardiosaludables, doris torres, canola, soja o girasol.  · No fría los alimentos. A la hora de cocinarlos, utilice métodos con bajo contenido de grasa, doris hornearlos, hervirlos, grillarlos y asarlos a la [place].  · Limite las grasas poco saludables haciendo lo siguiente:  ? Quite la piel a la carne de ave, doris el stef.  ? Quite todas las grasas visibles de las andreia.  ? Retire la grasa de los guisos, las sopas y las salsas antes de servirlos.  Planificación de las comidas    · Limite casanova consumo de:  ? Alimentos procesados, enlatados o empaquetados.  ? Alimentos con alto contenido de grasas trans, doris los alimentos fritos.  ? Dulces, postres, bebidas azucaradas y otros alimentos con azúcar agregada.  ? Alimentos lácteos enteros, doris la leche entera.  · Consuma jason dieta equilibrada, que incluya lo siguiente:  ? Entre 4 y 5 porciones de fruta todos los días, y entre 4 y 5 porciones de verduras todos los roberta. Trate de que la mitad del plato de cada comida tushar frutas y " verduras.  ? Hasta 6 u 8 porciones de cereales integrales por día.  ? Hasta 2 porciones de carne magra, de ave o de pescado todos los días. Jason porción de carne equivale a 3 onzas. [place] es stormy el mismo tamaño que jason baraja de cartas.  ? Dos porciones de productos lácteos descremados por día.  ? Grasas cardiosaludables. Las grasas saludables llamadas ácidos grasos omega-3 se encuentran en alimentos doris las semillas de tay y los pescados de agua fría, doris la eitan, el salmón y la caballa.  · Trate de consumir entre 25 y 35 g (gramos) de fibra por día. Los alimentos con alto contenido de fibra incluyen manzanas, brócoli, zanahorias, frijoles, guisantes y cereales integrales.  · No agregue sal o condimentos que contengan sal (doris salsa de soja) a la comida antes de comer.  · Cuando coma en un restaurante, pida que preparen casanova comida con menos sal o, en lo posible, sin nada de sal.  · Trate de comer al menos 2 comidas vegetarianas por semana.  · Consuma más comida casera y menos de restaurante, de bufé y comida rápida.  Alimentos recomendados  Esta podría no ser jason lista completa. Hable con el nutricionista sobre las mejores opciones alimenticias para usted.  Granos  Ann con menos de 80 mg de sodio por rebanada. Pasta integral, quinua y arroz integral. Shoaib. Cebada. Mijo. Sémola y crema de saji. Cereal frío integral o de salvado.  Verduras  Todas las verduras frescas. Las verduras congeladas sin salsa o viet agregada. Verduras enlatadas con bajo contenido de sodio o sin sodio.  Frutas  Todas las frutas frescas, congeladas y enlatadas. Frutas secas, doris pasas, ciruelas pasa y arándanos.  [place]leen y otros alimentos ricos en proteínas  Camejo de carne magros. Rigoberto y pavo sin piel. Pescados con alto contenido de ácidos grasos omega-3, doris el salmón, la eitan y otros pescados de agua fría. Huevos. Frijoles secos, guisantes y edamame. Romel secos y mantequilla de nuez sin sal.  Lácteos  Leche o leche en polvo con  bajo contenido de grasa o sin grasa (descremada). Leche de arroz, leche de soja y leche de almendras. Yogur semidescremado o descremado. Cantidades pequeñas de cubos de queso con cantidad reducida de sodio. Queso cottage con bajo contenido de sodio.  Grasas y aceites  Aceite de torres, canola, soja, aty o girasol. [place].  Dulces y postres  Puré de manzana. Barras de granola. Budines y gelatina sin azúcar. Barras de fruta congeladas.  Condimentos y otros alimentos  Hierbas frescas y secas. Jugo de yao o lima. Vinagre. Kétchup con bajo contenido de sodio. Adobos, aderezos para ensaladas, salsas y condimentos sin sal.  Alimentos que deben evitarse  Esta podría no ser jason lista completa. Hable con el nutricionista sobre las mejores opciones alimenticias para usted.  Granos  Ann con no más de 80 mg de sodio por rebanada. Cereal caliente o frío con más de 140 mg de sodio por porción. Pretzels y galletas salados. Pan rallado empaquetado. Bagels, croissants y galletas.  Verduras  Verduras enlatadas. Verduras congeladas con salsa o condimentos. Verduras con crema. Jignesh fritas. Anillos de cebollas. Verduras encurtidas y chucrut.  Frutas  Frutas secas con conservantes que tienen sodio.  [place]leen y otros alimentos ricos en proteínas  Costillas y alitas de stef. [place], jamón, pepperoni, boloñesa, kong y fiambres empaquetados. Hot dogs, bratwurst y salchichas. [place] enlatadas. Cathleen y pescados ahumados. Romel secos y semillas con sal.  Lácteos  Leche entera, mezcla de leche entera y crema, y crema. Giancarlo de leche. Quesos procesados, quesos para untar y cuajadas. Queso cottage común. Queso feta. Queso rallado. Queso en hebras.  Grasas y aceites  [place], manteca de cerdo, grasa, mantequilla clarificada y grasa de tocino. Salsas en swathi y envasadas.  Condimentos y otros alimentos  Sal de cebolla y ajo, sal de patel y sal raquel. Adobos. Aderezos comunes para ensalada. Salsas, pepinillos y aceitunas. Saborizantes y  ablandadores de carne, y cubos de caldo. Rábano picante, kétchup y mostaza. Salsa [place]. Salsa teriyaki, salsa de soja (incluso la que tiene contenido reducido de sodio). Salsa picante y salsa tabasco. Salsa de carne, salsa de pescado, salsa de ostras y salsa cóctel. Aderezos para tacos. Salsa barbacoa. Salsa tártara.  Resumen  · Un plan de alimentación para la insuficiencia cardíaca incluye la implementación de cambios para limitar la ingesta de sodio y grasas poco saludables, y puede ayudarlo a adelgazar o a mantener un peso saludable. El médico también puede recomendarle que limite la cantidad de líquido que leann.  · La mayoría de las personas con insuficiencia cardíaca no deben consumir más de 2,300 mg de sal (sodio) por día. La cantidad de sodio que le recomienden puede ser más baja, según casanova afección.  · Comuníquese con el médico o el nutricionista antes de implementar cambios significativos en la alimentación.  Esta información no tiene doris fin reemplazar el consejo del médico. Asegúrese de hacerle al médico cualquier pregunta que tenga.  Document Released: 07/24/2018 Document Revised: 10/21/2019 Document Reviewed: 10/17/2019  Elsevier Patient Education © 2020 Elsevier Inc.    Diabetes mellitus y nutrición, en adultos  Diabetes Mellitus and Nutrition, Adult  Si sufre de diabetes (diabetes mellitus), es muy importante tener hábitos alimenticios saludables debido a que carmelita niveles de azúcar en la ivon (glucosa) se marion afectados en gran medida por lo que come y rafat. [place] alimentos saludables en las cantidades adecuadas, aproximadamente a la misma hora todos los días, lo ayudará a:  · Controlar la glucemia.  · Disminuir el riesgo de sufrir jason enfermedad cardíaca.  · Mejorar la presión arterial.  · Alcanzar o mantener un peso saludable.  Todas las personas que sufren de diabetes son diferentes y cada jason tiene necesidades diferentes en cuanto a un plan de alimentación. El médico puede recomendarle  que trabaje con un especialista en dietas y nutrición (nutricionista) para elaborar el mejor plan para usted. Casanova plan de alimentación puede variar según factores doris:  · Las calorías que necesita.  · Los medicamentos que leann.  · Casanova peso.  · Sonia niveles de glucemia, presión arterial y colesterol.  · Casanova nivel de actividad.  · Otras afecciones que tenga, doris enfermedades cardíacas o renales.  ¿Cómo me afectan los carbohidratos?  Los carbohidratos, o hidratos de carbono, afectan casanova nivel de glucemia más que cualquier otro tipo de alimento. La ingesta de carbohidratos naturalmente aumenta la cantidad de glucosa en la ivon. El recuento de carbohidratos es un método destinado a llevar un registro de la cantidad de carbohidratos que se consumen. El recuento de carbohidratos es importante para mantener la glucemia a un nivel saludable, especialmente si utiliza insulina o leann determinados medicamentos por vía oral para la diabetes.  Es importante conocer la cantidad de carbohidratos que se pueden ingerir en cada comida sin correr ningún riesgo. Olar es diferente en cada persona. Casanova nutricionista puede ayudarlo a calcular la cantidad de carbohidratos que debe ingerir en cada comida y en cada refrigerio.  Entre los alimentos que contienen carbohidratos, se incluyen:  · Pan, cereal, arroz, pastas y galletas.  · Jignesh y maíz.  · Guisantes, frijoles y lentejas.  · Leche y yogur.  · Frutas y jugo.  · Postres, doris pasteles, galletas, helado y caramelos.  ¿Cómo me afecta el alcohol?  El alcohol puede provocar disminuciones súbitas de la glucemia (hipoglucemia), especialmente si utiliza insulina o leann determinados medicamentos por vía oral para la diabetes. La hipoglucemia es jason afección potencialmente mortal. Los síntomas de la hipoglucemia (somnolencia, mareos y confusión) son similares a los síntomas de nando consumido demasiado alcohol.  Si el médico afirma que el alcohol es seguro para usted, siga estas pautas:  · Limite  el consumo de alcohol a no más de 1 medida por día si es kamila y no está embarazada, y a 2 medidas si es hombre. Marta medida equivale a 12 oz (355 ml) de cerveza, 5 oz (148 ml) de vino o 1½ oz (44 ml) de bebidas alcohólicas de donato graduación.  · No pennie con el estómago vacío.  · Manténgase hidratado bebiendo agua, refrescos dietéticos o té helado sin azúcar.  · Tenga en cuenta que los refrescos comunes, los jugos y otras bebida para mezclar pueden contener mucha azúcar y se deben contar doris carbohidratos.  ¿Cuáles son algunos consejos para seguir jayde plan?    Leer las etiquetas de los alimentos  · Comience por leer el tamaño de la porción en la “Información nutricional” en las etiquetas de los alimentos envasados y las bebidas. La cantidad de calorías, carbohidratos, grasas y otros nutrientes mencionados en la etiqueta se basan en marta porción del alimento. Muchos alimentos contienen más de marta porción por envase.  · Verifique la cantidad total de gramos (g) de carbohidratos totales en marta porción. Puede calcular la cantidad de porciones de carbohidratos al dividir el total de carbohidratos por 15. Por ejemplo, si un alimento tiene un total de 30 g de carbohidratos, equivale a 2 porciones de carbohidratos.  · Verifique la cantidad de gramos (g) de grasas saturadas y grasas trans en marta porción. Escoja alimentos que no contengan grasa o que tengan un bajo contenido.  · Verifique la cantidad de miligramos (mg) de sal (sodio) en marta porción. La mayoría de las personas deben limitar la ingesta de sodio total a menos de 2300 mg por día.  · Siempre consulte la información nutricional de los alimentos etiquetados doris “con bajo contenido de grasa” o “sin grasa”. Estos alimentos pueden tener un mayor contenido de azúcar agregada o carbohidratos refinados, y deben evitarse.  · Hable con casanova nutricionista para identificar carmelita objetivos diarios en cuanto a los nutrientes mencionados en la etiqueta.  Al ir de compras  · Evite  comprar alimentos procesados, enlatados o precocinados. Estos alimentos tienden a tener jason mayor cantidad de grasa, sodio y azúcar agregada.  · Compre en la myra exterior de la justin de comestibles. Esta myra incluye frutas y verduras frescas, granos a granel, andreia frescas y productos lácteos frescos.  Al cocinar  · Utilice métodos de cocción a baja temperatura, doris hornear, en lugar de métodos de cocción a donato temperatura, doris freír en abundante aceite.  · Cocine con aceites saludables, doris el aceite de torres, canola o girasol.  · Evite cocinar con manteca, crema o andreia con alto contenido de grasa.  Planificación de las comidas  · Coma las comidas y los refrigerios regularmente, preferentemente a la misma hora todos los días. Evite pasar largos períodos de tiempo sin comer.  · Consuma alimentos ricos en fibra, doris frutas frescas, verduras, frijoles y cereales integrales. Consulte a casanova nutricionista sobre cuántas porciones de carbohidratos puede consumir en cada comida.  · Consuma entre 4 y 6 onzas (oz) de proteínas magras por día, doris anderia magras, stef, pescado, huevos o tofu. Jason onza de proteína magra equivale a:  ? 1 onza de carne, stef o pescado.  ? 1 huevo.  ? ¼ taza de tofu.  · Coma algunos alimentos por día que contengan grasas saludables, doris aguacates, leoncio secos, semillas y pescado.  Estilo de flo  · Controle casanova nivel de glucemia con regularidad.  · Juliane actividad física habitualmente doris se lo haya indicado el médico. [place] puede incluir lo siguiente:  ? 150 minutos semanales de ejercicio de intensidad moderada o donato. [place] podría incluir caminatas dinámicas, ciclismo o gimnasia acuática.  ? Realizar ejercicios de elongación y de fortalecimiento, doris yoga o levantamiento de pesas, por lo menos 2 veces por semana.  · [place] los medicamentos doris se lo haya indicado el médico.  · No consuma ningún producto que contenga nicotina o tabaco, doris cigarrillos y cigarrillos electrónicos. Si  necesita ayuda para dejar de fumar, consulte al médico.  · Trabaje con un asesor o instructor en diabetes para identificar estrategias para controlar el estrés y cualquier desafío emocional y social.  Preguntas para hacerle al médico  · ¿Es necesario que consulte a un instructor en el cuidado de la diabetes?  · ¿Es necesario que me reúna con un nutricionista?  · ¿A qué número puedo llamar si tengo preguntas?  · ¿Cuáles son los mejores momentos para controlar la glucemia?  Dónde encontrar más información:  · Asociación Estadounidense de la Diabetes (American Diabetes Association): diabetes.org  · Academia de Nutrición y Dietética (Academy of Nutrition and Dietetics): www.eatright.org  · Instituto Nacional de la Diabetes y las Enfermedades Digestivas y Renales (National [place] of Diabetes and Digestive and Kidney Diseases, NIH): www.niddk.nih.gov  Resumen  · Un plan de alimentación saludable lo ayudará a controlar la glucemia y mantener un estilo de flo saludable.  · Trabajar con un especialista en dietas y nutrición (nutricionista) puede ayudarlo a elaborar el mejor plan de alimentación para usted.  · Tenga en cuenta que los carbohidratos (hidratos de carbono) y el alcohol tienen efectos inmediatos en carmelita niveles de glucemia. Es importante contar los carbohidratos que ingiere y consumir alcohol con martha.  Esta información no tiene doris fin reemplazar el consejo del médico. Asegúrese de hacerle al médico cualquier pregunta que tenga.  Document Released: 03/26/2009 Document Revised: 08/28/2018 Document Reviewed: 04/09/2018  Elsevier Patient Education © 2020 Elsevier Inc.

## 2020-08-20 NOTE — DISCHARGE SUMMARY
Discharge Summary    CHIEF COMPLAINT ON ADMISSION  Chief Complaint   Patient presents with   • Shortness of Breath     sent from [facility] , x2wks   • Leg Swelling     trace ankle swelling       Reason for Admission  SOB     Admission Date  8/12/2020    CODE STATUS  Prior    HPI & HOSPITAL COURSE  This is a 65 y.o. male here with acute congestive heart failure, work-up showed cardiomyopathy with ejection fraction of 25%. Cardiology was consulted on the case.  Left heart catheterization was done which showed Two-vessel coronary artery disease, chronic total occlusion of the mid left anterior descending artery, high-grade mid posterior descending artery and ostial posterolateral stenosis on 8/16/2020.  Cardiac surgery was then consulted on the case, recommendation was PCI.  Patient then underwent repeat left heart catheterization s/p stenting of posterior descending artery with 2.25 x 24 mm Synergy drug eluting stent with no significant residual stenosis and EL-3 flow, s/p balloon angioplasty and stenting of mid to distal left anterior descending artery with 2 x 30 mm Cain drug eluting stent with no significant restenosis in the stented segment but residual diffuse disease in the distal left anterior descending artery with EL-3 flow. 8/19/2020.  He tolerated procedure well.  Cardiology has cleared him for discharge.       Therefore, he is discharged in good and stable condition to home with close outpatient follow-up.    The patient met 2-midnight criteria for an inpatient stay at the time of discharge.    Discharge Date  8/20/2020    FOLLOW UP ITEMS POST DISCHARGE  Follow-up with cardiology    DISCHARGE DIAGNOSES  Principal Problem:    Acute systolic congestive heart failure (HCC) POA: Yes  Active Problems:    Coronary artery disease involving native coronary artery of native heart without angina pectoris POA: Yes    Cardiomyopathy (HCC) POA: Yes    Type 2 diabetes mellitus with hyperglycemia, with  long-term current use of insulin (HCC) POA: Yes    Essential hypertension POA: Yes    Elevated troponin POA: Yes    HALEY (acute kidney injury) (HCC) POA: Yes    Hyponatremia POA: Yes    Hypokalemia POA: Yes    Hypomagnesemia POA: Yes    S/P MVR (mitral valve replacement) POA: Yes  Resolved Problems:    * No resolved hospital problems. *      FOLLOW UP  Future Appointments   Date Time Provider Department Center   9/3/2020  8:15 AM Ariela Garcia P.A.-C. [facility] None     JOHNNY Gibbons  [address] [facility] [address]  [phone]    Go on 8/26/2020  Please arrive at 1:45 pm for your appointment at 2:00 pm.Thank you       MEDICATIONS ON DISCHARGE     Medication List      START taking these medications      Instructions   aspirin 81 MG EC tablet  Start taking on: August 21, 2020   Take 1 Tab by mouth every day.  Dose: 81 mg     metoprolol SR 25 MG Tb24  Commonly known as: TOPROL XL   Take 3 Tabs by mouth every evening.  Dose: 75 mg     spironolactone 25 MG Tabs  Commonly known as: ALDACTONE   Take 0.5 Tabs by mouth every day.  Dose: 12.5 mg     ticagrelor 90 MG Tabs tablet  Commonly known as: BRILINTA   Take 1 Tab by mouth 2 Times a Day.  Dose: 90 mg        CHANGE how you take these medications      Instructions   atorvastatin 80 MG tablet  What changed:   · medication strength  · how much to take  Commonly known as: LIPITOR   Take 1 Tab by mouth every evening.  Dose: 80 mg     losartan 50 MG Tabs  What changed:   · medication strength  · how much to take  Commonly known as: COZAAR   Take 1 Tab by mouth every evening.  Dose: 50 mg        CONTINUE taking these medications      Instructions   insulin 70/30 (70-30) 100 UNIT/ML Susp  Commonly known as: HumuLIN 70/30/NovoLIN 70/30   Inject 15 Units as instructed 2 Times a Day.  Dose: 15 Units     metFORMIN 500 MG Tabs  Commonly known as: GLUCOPHAGE   Take 500 mg by mouth 2 times a day.  Dose: 500 mg            Allergies  No Known Allergies    DIET  No orders  of the defined types were placed in this encounter.      ACTIVITY  Light duty.  Weight bearing as tolerated    CONSULTATIONS  Cardiology - [facility]  Cardiac surgery - [facility]    PROCEDURES  [facility]HC - s/p stenting of posterior descending artery with 2.25 x 24 mm Synergy drug eluting stent with no significant residual stenosis and EL-3 flow, s/p balloon angioplasty and stenting of mid to distal left anterior descending artery with 2 x 30 mm Cain drug eluting stent with no significant restenosis in the stented segment but residual diffuse disease in the distal left anterior descending artery with EL-3 flow. 8/19/2020  [facility] - Two-vessel coronary artery disease, chronic total occlusion of the mid left anterior descending artery, high-grade mid posterior descending artery and ostial posterolateral stenosis. 8/16/2020    LABORATORY  Lab Results   Component Value Date    SODIUM 137 08/20/2020    POTASSIUM 3.7 08/20/2020    CHLORIDE 99 08/20/2020    CO2 25 08/20/2020    GLUCOSE 104 (H) 08/20/2020    BUN 24 (H) 08/20/2020    CREATININE 0.95 08/20/2020        Lab Results   Component Value Date    WBC 10.4 08/20/2020    HEMOGLOBIN 14.8 08/20/2020    HEMATOCRIT 43.2 08/20/2020    PLATELETCT 232 08/20/2020        Total time of the discharge process exceeds 40 minutes.

## 2020-08-20 NOTE — PROGRESS NOTES
Bedside report received from day nurse. Assumed care of patient.  used, Pt. resting comfortably without any sign of distress. A&Ox4, VSS, Radial Cath site CDI, no complaints at this time. POC reviewed and white board updated, Tele box on, Call light in reach, Bed locked in lowest position.

## 2020-08-28 ENCOUNTER — TELEPHONE (OUTPATIENT)
Dept: CARDIOLOGY | Facility: MEDICAL CENTER | Age: 65
End: 2020-08-28

## 2020-08-28 NOTE — TELEPHONE ENCOUNTER
Spoke with needing to confirmed if patient has seen any prior cardio per patient he has not seen any prior cardio or testing his Son did confirmed this with me.

## 2020-09-03 ASSESSMENT — ENCOUNTER SYMPTOMS
SPUTUM PRODUCTION: 0
COUGH: 0
NAUSEA: 0
CHILLS: 0
DIZZINESS: 0
FEVER: 0
HEADACHES: 0
HEMOPTYSIS: 0
VOMITING: 0
WHEEZING: 0
PALPITATIONS: 0
SHORTNESS OF BREATH: 0
CLAUDICATION: 0
ORTHOPNEA: 0
PND: 0

## 2020-09-03 NOTE — PROGRESS NOTES
Chief Complaint   Patient presents with   • Coronary Artery Disease       Subjective:   Chino Deluca is a 65 y.o. male who presents today after being hospitalized August 12-20, 2020 for newly diagnosed cardiomyopathy.  Patient underwent cardiac catheterization 8/16/2020 with  of mid LAD, high-grade mid posterior lateral descending artery and ostial posterolateral stenosis with EF 20% and recommendations for CABG. Patient was felt not to be surgical candidate thus repeat cardiac catheterization 8/19/2020 with a 2.25 x 24 mm Synergy CARLOS placed to the PDA with EL-3 flow and balloon angioplasty and stenting of the mid-distal LAD with a 2 x 30 mm Cain CARLOS with no significant restenosis in the stented segment but residual diffuse disease in the distal LAD but with EL-3 flow.    9/3/2020: Patient comes in doing well.  He has all his medications with him.  I have stressed the importance of taking Brilinta.  He says he can afford it because he has a coupon.  We have addressed this on 3 occasions throughout the visit.  He has all his other medications with him and they are in the appropriate dosages.  He denies chest pain, palpitations, orthopnea, lower extremity edema, and PND.      Past Medical History:   Diagnosis Date   • Cataract     OU-had surgery   • Diabetes (HCC)     Takes Insulin and oral medication   • Full dentures    • Glaucoma     OU Had Surgery   • Heart valve disease     Pts. friend states, Heart Valve Replacement At [facility] 2016    • High cholesterol    • Hypertension    • Pneumonia     H/O 2016   • Snoring      Past Surgical History:   Procedure Laterality Date   • VITRECTOMY POSTERIOR Left 6/9/2020    Procedure: VITRECTOMY, POSTERIOR PORTION- MAMBRANE PEEL POSS LASER GAS OR OIL;  Surgeon: Deonte Miguel M.D.;  Location: SURGERY SAME DAY [facility];  Service: Ophthalmology   • OTHER      cataract bilateral   • OTHER      Surgery For Glaucoma OU    • OTHER CARDIAC SURGERY      Per pts.  friend Heart valve replacement 2016 [facility]     No family history on file.  Social History     Socioeconomic History   • Marital status:      Spouse name: Not on file   • Number of children: Not on file   • Years of education: Not on file   • Highest education level: Not on file   Occupational History   • Not on file   Social Needs   • Financial resource strain: Not on file   • Food insecurity     Worry: Not on file     Inability: Not on file   • Transportation needs     Medical: Not on file     Non-medical: Not on file   Tobacco Use   • Smoking status: Never Smoker   • Smokeless tobacco: Never Used   Substance and Sexual Activity   • Alcohol use: No   • Drug use: No   • Sexual activity: Yes     Partners: Female   Lifestyle   • Physical activity     Days per week: Not on file     Minutes per session: Not on file   • Stress: Not on file   Relationships   • Social connections     Talks on phone: Not on file     Gets together: Not on file     Attends Taoism service: Not on file     Active member of club or organization: Not on file     Attends meetings of clubs or organizations: Not on file     Relationship status: Not on file   • Intimate partner violence     Fear of current or ex partner: Not on file     Emotionally abused: Not on file     Physically abused: Not on file     Forced sexual activity: Not on file   Other Topics Concern   • Not on file   Social History Narrative   • Not on file     No Known Allergies  Outpatient Encounter Medications as of 9/4/2020   Medication Sig Dispense Refill   • FARXIGA 5 MG Tab TAKE 1 TABLET BY MOUTH ONCE DAILY IN THE MORNING FOR DIABETES     • ticagrelor (BRILINTA) 90 MG Tab tablet Take 1 Tab by mouth 2 Times a Day. 60 Tab 11   • atorvastatin (LIPITOR) 80 MG tablet Take 1 Tab by mouth every evening. 30 Tab 11   • losartan (COZAAR) 50 MG Tab Take 1 Tab by mouth every evening. 30 Tab 11   • aspirin EC 81 MG EC tablet Take 1 Tab by mouth every day. 30 Tab 11   •  "spironolactone (ALDACTONE) 25 MG Tab Take 0.5 Tabs by mouth every day. 30 Tab 11   • metoprolol SR (TOPROL XL) 25 MG TABLET SR 24 HR Take 3 Tabs by mouth every evening. 30 Tab 11   • metFORMIN (GLUCOPHAGE) 500 MG Tab Take 500 mg by mouth 2 times a day.     • insulin 70/30 (HUMULIN/NOVOLIN) (70-30) 100 UNIT/ML Suspension Inject 15 Units as instructed 2 Times a Day. 10 mL 0     No facility-administered encounter medications on file as of 9/4/2020.      Review of Systems   Constitutional: Negative for chills and fever.   Respiratory: Negative for cough, hemoptysis, sputum production, shortness of breath and wheezing.    Cardiovascular: Negative for chest pain, palpitations, orthopnea, claudication, leg swelling and PND.   Gastrointestinal: Negative for nausea and vomiting.   Neurological: Negative for dizziness and headaches.   All other systems reviewed and are negative.       Objective:   /78 (BP Location: Left arm, Patient Position: Sitting, BP Cuff Size: Adult)   Pulse 70   Resp 16   Ht 1.676 m (5' 6\")   Wt 87.1 kg (192 lb)   SpO2 94%   BMI 30.99 kg/m²     Physical Exam   Constitutional: He appears well-developed and well-nourished.   Eyes: EOM are normal.   Neck: Neck supple. No JVD present.   Cardiovascular: Normal rate, regular rhythm and normal heart sounds.   No murmur heard.  Pulmonary/Chest: Effort normal and breath sounds normal.   Abdominal: Soft.   Neurological:   CN II-XII grossly intact   Skin: Skin is warm and dry.   Psychiatric: He has a normal mood and affect. His behavior is normal. Judgment and thought content normal.   Nursing note and vitals reviewed.    Cardiac Imaging and Procedures Review  EKG: On admission sinus rhythm, first-degree AV block, left anterior fascicular block, T wave inversion in anterior leads.     Echocardiogram: 8/13/2020  LVEF 25%, akinesis of the mid to distal inferior wall, distal anterior wall, mid to distal septum, known mitral valve replacement is " functioning normally with appropriate transvalvular gradient.     Cardiac Catheterization: 8/16/2020   of mid LAD, high-grade mid posterior descending artery and ostial posterolateral stenosis, EF 20%, recommendation for CABG.     Patient underwent repeat coronary angiography 8/19/2020 secondary to poor surgical candidate for CABG:  Successful PCI/CARLOS to posterior descending artery, status post successful PTCA/CARLOS/PCI to mid to distal LAD.         Assessment:     1. Coronary artery disease involving native coronary artery of native heart without angina pectoris     2. Hyperlipidemia, unspecified hyperlipidemia type     3. Essential hypertension     4. S/P MVR (mitral valve replacement)     5. Ischemic cardiomyopathy         Medical Decision Making:  Today's Assessment / Status / Plan:          services were used in the patient's primary language of Frisian.     Name or Number: [id number]  Mode of interpretation: iPad    Content of Interpretation:  POC, cardiac catheterization, medications    Collaborating MD is Dr. Varner.  Follow-up visit in 3 months with AA.    Please note that this dictation was created using voice recognition software.  I have made every reasonable attempt to correct obvious errors, but it is possible there are errors of grammar or possibly content that I did not discover before finalizing the note.

## 2020-09-04 ENCOUNTER — OFFICE VISIT (OUTPATIENT)
Dept: CARDIOLOGY | Facility: MEDICAL CENTER | Age: 65
End: 2020-09-04
Payer: COMMERCIAL

## 2020-09-04 VITALS
HEIGHT: 66 IN | RESPIRATION RATE: 16 BRPM | WEIGHT: 192 LBS | DIASTOLIC BLOOD PRESSURE: 78 MMHG | SYSTOLIC BLOOD PRESSURE: 138 MMHG | OXYGEN SATURATION: 94 % | BODY MASS INDEX: 30.86 KG/M2 | HEART RATE: 70 BPM

## 2020-09-04 DIAGNOSIS — I25.10 CORONARY ARTERY DISEASE INVOLVING NATIVE CORONARY ARTERY OF NATIVE HEART WITHOUT ANGINA PECTORIS: ICD-10-CM

## 2020-09-04 DIAGNOSIS — E78.5 HYPERLIPIDEMIA, UNSPECIFIED HYPERLIPIDEMIA TYPE: ICD-10-CM

## 2020-09-04 DIAGNOSIS — I25.5 ISCHEMIC CARDIOMYOPATHY: ICD-10-CM

## 2020-09-04 DIAGNOSIS — Z95.2 S/P MVR (MITRAL VALVE REPLACEMENT): ICD-10-CM

## 2020-09-04 DIAGNOSIS — I10 ESSENTIAL HYPERTENSION: ICD-10-CM

## 2020-09-04 PROCEDURE — 99214 OFFICE O/P EST MOD 30 MIN: CPT | Performed by: NURSE PRACTITIONER

## 2020-09-04 RX ORDER — DAPAGLIFLOZIN 5 MG/1
TABLET, FILM COATED ORAL
COMMUNITY
Start: 2020-08-26 | End: 2021-05-19

## 2020-09-04 ASSESSMENT — FIBROSIS 4 INDEX: FIB4 SCORE: 1.73

## 2021-03-03 DIAGNOSIS — Z23 NEED FOR VACCINATION: ICD-10-CM

## 2021-05-13 ENCOUNTER — APPOINTMENT (OUTPATIENT)
Dept: RADIOLOGY | Facility: MEDICAL CENTER | Age: 66
DRG: 291 | End: 2021-05-13
Attending: EMERGENCY MEDICINE
Payer: MEDICARE

## 2021-05-13 ENCOUNTER — HOSPITAL ENCOUNTER (INPATIENT)
Facility: MEDICAL CENTER | Age: 66
LOS: 3 days | DRG: 291 | End: 2021-05-16
Attending: EMERGENCY MEDICINE | Admitting: STUDENT IN AN ORGANIZED HEALTH CARE EDUCATION/TRAINING PROGRAM
Payer: MEDICARE

## 2021-05-13 ENCOUNTER — APPOINTMENT (OUTPATIENT)
Dept: RADIOLOGY | Facility: MEDICAL CENTER | Age: 66
DRG: 291 | End: 2021-05-13
Payer: MEDICARE

## 2021-05-13 DIAGNOSIS — I25.10 CORONARY ARTERY DISEASE INVOLVING NATIVE CORONARY ARTERY OF NATIVE HEART WITHOUT ANGINA PECTORIS: ICD-10-CM

## 2021-05-13 DIAGNOSIS — I50.21 ACUTE SYSTOLIC CONGESTIVE HEART FAILURE (HCC): Primary | ICD-10-CM

## 2021-05-13 DIAGNOSIS — I25.5 ISCHEMIC CARDIOMYOPATHY: ICD-10-CM

## 2021-05-13 PROBLEM — I50.23 ACUTE ON CHRONIC HFREF (HEART FAILURE WITH REDUCED EJECTION FRACTION) (HCC): Status: ACTIVE | Noted: 2020-08-12

## 2021-05-13 PROBLEM — Z95.5 STATUS POST INSERTION OF DRUG ELUTING CORONARY ARTERY STENT: Chronic | Status: ACTIVE | Noted: 2021-05-13

## 2021-05-13 PROBLEM — I16.0 HYPERTENSIVE URGENCY: Status: ACTIVE | Noted: 2021-05-13

## 2021-05-13 LAB
ALBUMIN SERPL BCP-MCNC: 4.2 G/DL (ref 3.2–4.9)
ALBUMIN/GLOB SERPL: 1.5 G/DL
ALP SERPL-CCNC: 154 U/L (ref 30–99)
ALT SERPL-CCNC: 61 U/L (ref 2–50)
ANION GAP SERPL CALC-SCNC: 12 MMOL/L (ref 7–16)
AST SERPL-CCNC: 43 U/L (ref 12–45)
BASOPHILS # BLD AUTO: 0.7 % (ref 0–1.8)
BASOPHILS # BLD: 0.07 K/UL (ref 0–0.12)
BILIRUB SERPL-MCNC: 0.8 MG/DL (ref 0.1–1.5)
BUN SERPL-MCNC: 17 MG/DL (ref 8–22)
CALCIUM SERPL-MCNC: 8.8 MG/DL (ref 8.5–10.5)
CHLORIDE SERPL-SCNC: 104 MMOL/L (ref 96–112)
CO2 SERPL-SCNC: 26 MMOL/L (ref 20–33)
CREAT SERPL-MCNC: 0.83 MG/DL (ref 0.5–1.4)
EKG IMPRESSION: NORMAL
EKG IMPRESSION: NORMAL
EOSINOPHIL # BLD AUTO: 0.71 K/UL (ref 0–0.51)
EOSINOPHIL NFR BLD: 7.1 % (ref 0–6.9)
ERYTHROCYTE [DISTWIDTH] IN BLOOD BY AUTOMATED COUNT: 45.8 FL (ref 35.9–50)
EST. AVERAGE GLUCOSE BLD GHB EST-MCNC: 240 MG/DL
FLUAV RNA SPEC QL NAA+PROBE: NEGATIVE
FLUBV RNA SPEC QL NAA+PROBE: NEGATIVE
GLOBULIN SER CALC-MCNC: 2.8 G/DL (ref 1.9–3.5)
GLUCOSE BLD-MCNC: 108 MG/DL (ref 65–99)
GLUCOSE SERPL-MCNC: 168 MG/DL (ref 65–99)
HBA1C MFR BLD: 10 % (ref 4–5.6)
HCT VFR BLD AUTO: 41.1 % (ref 42–52)
HGB BLD-MCNC: 13.7 G/DL (ref 14–18)
IMM GRANULOCYTES # BLD AUTO: 0.02 K/UL (ref 0–0.11)
IMM GRANULOCYTES NFR BLD AUTO: 0.2 % (ref 0–0.9)
LACTATE BLD-SCNC: 1.7 MMOL/L (ref 0.5–2)
LYMPHOCYTES # BLD AUTO: 2.08 K/UL (ref 1–4.8)
LYMPHOCYTES NFR BLD: 20.8 % (ref 22–41)
MCH RBC QN AUTO: 29.5 PG (ref 27–33)
MCHC RBC AUTO-ENTMCNC: 33.3 G/DL (ref 33.7–35.3)
MCV RBC AUTO: 88.6 FL (ref 81.4–97.8)
MONOCYTES # BLD AUTO: 1.12 K/UL (ref 0–0.85)
MONOCYTES NFR BLD AUTO: 11.2 % (ref 0–13.4)
NEUTROPHILS # BLD AUTO: 6.02 K/UL (ref 1.82–7.42)
NEUTROPHILS NFR BLD: 60 % (ref 44–72)
NRBC # BLD AUTO: 0 K/UL
NRBC BLD-RTO: 0 /100 WBC
NT-PROBNP SERPL IA-MCNC: 1836 PG/ML (ref 0–125)
PLATELET # BLD AUTO: 227 K/UL (ref 164–446)
PMV BLD AUTO: 10.6 FL (ref 9–12.9)
POTASSIUM SERPL-SCNC: 3.9 MMOL/L (ref 3.6–5.5)
PROT SERPL-MCNC: 7 G/DL (ref 6–8.2)
RBC # BLD AUTO: 4.64 M/UL (ref 4.7–6.1)
RSV RNA SPEC QL NAA+PROBE: NEGATIVE
SARS-COV-2 RNA RESP QL NAA+PROBE: NOTDETECTED
SODIUM SERPL-SCNC: 142 MMOL/L (ref 135–145)
SPECIMEN SOURCE: NORMAL
TROPONIN T SERPL-MCNC: 27 NG/L (ref 6–19)
WBC # BLD AUTO: 10 K/UL (ref 4.8–10.8)

## 2021-05-13 PROCEDURE — 84484 ASSAY OF TROPONIN QUANT: CPT

## 2021-05-13 PROCEDURE — 80053 COMPREHEN METABOLIC PANEL: CPT

## 2021-05-13 PROCEDURE — 93005 ELECTROCARDIOGRAM TRACING: CPT

## 2021-05-13 PROCEDURE — 82962 GLUCOSE BLOOD TEST: CPT

## 2021-05-13 PROCEDURE — A9270 NON-COVERED ITEM OR SERVICE: HCPCS | Performed by: STUDENT IN AN ORGANIZED HEALTH CARE EDUCATION/TRAINING PROGRAM

## 2021-05-13 PROCEDURE — 0241U HCHG SARS-COV-2 COVID-19 NFCT DS RESP RNA 4 TRGT MIC: CPT

## 2021-05-13 PROCEDURE — 83605 ASSAY OF LACTIC ACID: CPT

## 2021-05-13 PROCEDURE — C9803 HOPD COVID-19 SPEC COLLECT: HCPCS | Performed by: EMERGENCY MEDICINE

## 2021-05-13 PROCEDURE — 83880 ASSAY OF NATRIURETIC PEPTIDE: CPT

## 2021-05-13 PROCEDURE — 700102 HCHG RX REV CODE 250 W/ 637 OVERRIDE(OP): Performed by: STUDENT IN AN ORGANIZED HEALTH CARE EDUCATION/TRAINING PROGRAM

## 2021-05-13 PROCEDURE — 93005 ELECTROCARDIOGRAM TRACING: CPT | Performed by: EMERGENCY MEDICINE

## 2021-05-13 PROCEDURE — 36415 COLL VENOUS BLD VENIPUNCTURE: CPT

## 2021-05-13 PROCEDURE — 770020 HCHG ROOM/CARE - TELE (206)

## 2021-05-13 PROCEDURE — 700111 HCHG RX REV CODE 636 W/ 250 OVERRIDE (IP): Performed by: EMERGENCY MEDICINE

## 2021-05-13 PROCEDURE — 87040 BLOOD CULTURE FOR BACTERIA: CPT

## 2021-05-13 PROCEDURE — 99291 CRITICAL CARE FIRST HOUR: CPT | Performed by: STUDENT IN AN ORGANIZED HEALTH CARE EDUCATION/TRAINING PROGRAM

## 2021-05-13 PROCEDURE — 85025 COMPLETE CBC W/AUTO DIFF WBC: CPT

## 2021-05-13 PROCEDURE — 83036 HEMOGLOBIN GLYCOSYLATED A1C: CPT

## 2021-05-13 PROCEDURE — 96365 THER/PROPH/DIAG IV INF INIT: CPT

## 2021-05-13 PROCEDURE — 700111 HCHG RX REV CODE 636 W/ 250 OVERRIDE (IP): Performed by: STUDENT IN AN ORGANIZED HEALTH CARE EDUCATION/TRAINING PROGRAM

## 2021-05-13 PROCEDURE — 71045 X-RAY EXAM CHEST 1 VIEW: CPT

## 2021-05-13 PROCEDURE — 99285 EMERGENCY DEPT VISIT HI MDM: CPT

## 2021-05-13 PROCEDURE — 96375 TX/PRO/DX INJ NEW DRUG ADDON: CPT

## 2021-05-13 RX ORDER — LOSARTAN POTASSIUM 50 MG/1
50 TABLET ORAL DAILY
Status: DISCONTINUED | OUTPATIENT
Start: 2021-05-14 | End: 2021-05-14

## 2021-05-13 RX ORDER — AMOXICILLIN 250 MG
2 CAPSULE ORAL 2 TIMES DAILY
Status: DISCONTINUED | OUTPATIENT
Start: 2021-05-14 | End: 2021-05-16 | Stop reason: HOSPADM

## 2021-05-13 RX ORDER — ACETAMINOPHEN 325 MG/1
650 TABLET ORAL EVERY 6 HOURS PRN
Status: DISCONTINUED | OUTPATIENT
Start: 2021-05-13 | End: 2021-05-16 | Stop reason: HOSPADM

## 2021-05-13 RX ORDER — FUROSEMIDE 10 MG/ML
60 INJECTION INTRAMUSCULAR; INTRAVENOUS
Status: DISCONTINUED | OUTPATIENT
Start: 2021-05-14 | End: 2021-05-14

## 2021-05-13 RX ORDER — INSULIN LISPRO 100 [IU]/ML
1-6 INJECTION, SOLUTION INTRAVENOUS; SUBCUTANEOUS
Status: DISCONTINUED | OUTPATIENT
Start: 2021-05-13 | End: 2021-05-16 | Stop reason: HOSPADM

## 2021-05-13 RX ORDER — VITAMIN B COMPLEX
1000 TABLET ORAL EVERY MORNING
COMMUNITY

## 2021-05-13 RX ORDER — VITAMIN B COMPLEX
1000 TABLET ORAL DAILY
Status: DISCONTINUED | OUTPATIENT
Start: 2021-05-14 | End: 2021-05-16 | Stop reason: HOSPADM

## 2021-05-13 RX ORDER — DEXTROSE MONOHYDRATE 25 G/50ML
50 INJECTION, SOLUTION INTRAVENOUS
Status: DISCONTINUED | OUTPATIENT
Start: 2021-05-13 | End: 2021-05-16 | Stop reason: HOSPADM

## 2021-05-13 RX ORDER — SPIRONOLACTONE 25 MG/1
12.5 TABLET ORAL DAILY
Status: DISCONTINUED | OUTPATIENT
Start: 2021-05-14 | End: 2021-05-14

## 2021-05-13 RX ORDER — POLYETHYLENE GLYCOL 3350 17 G/17G
1 POWDER, FOR SOLUTION ORAL
Status: DISCONTINUED | OUTPATIENT
Start: 2021-05-13 | End: 2021-05-16 | Stop reason: HOSPADM

## 2021-05-13 RX ORDER — FUROSEMIDE 10 MG/ML
60 INJECTION INTRAMUSCULAR; INTRAVENOUS ONCE
Status: COMPLETED | OUTPATIENT
Start: 2021-05-13 | End: 2021-05-13

## 2021-05-13 RX ORDER — ATORVASTATIN CALCIUM 40 MG/1
40 TABLET, FILM COATED ORAL 2 TIMES DAILY
Status: DISCONTINUED | OUTPATIENT
Start: 2021-05-13 | End: 2021-05-16 | Stop reason: HOSPADM

## 2021-05-13 RX ORDER — ATORVASTATIN CALCIUM 40 MG/1
40 TABLET, FILM COATED ORAL NIGHTLY
COMMUNITY
End: 2023-02-22

## 2021-05-13 RX ORDER — IBUPROFEN 200 MG
400 TABLET ORAL 2 TIMES DAILY PRN
Status: ON HOLD | COMMUNITY
End: 2021-05-16

## 2021-05-13 RX ORDER — NITROGLYCERIN 0.4 MG/1
0.4 TABLET SUBLINGUAL
Status: DISPENSED | OUTPATIENT
Start: 2021-05-13 | End: 2021-05-13

## 2021-05-13 RX ORDER — NITROGLYCERIN 20 MG/100ML
10 INJECTION INTRAVENOUS CONTINUOUS
Status: DISCONTINUED | OUTPATIENT
Start: 2021-05-13 | End: 2021-05-14

## 2021-05-13 RX ORDER — POTASSIUM CHLORIDE 20 MEQ/1
40 TABLET, EXTENDED RELEASE ORAL DAILY
Status: DISCONTINUED | OUTPATIENT
Start: 2021-05-14 | End: 2021-05-14

## 2021-05-13 RX ORDER — CARVEDILOL 3.12 MG/1
3.12 TABLET ORAL 2 TIMES DAILY WITH MEALS
Status: DISCONTINUED | OUTPATIENT
Start: 2021-05-13 | End: 2021-05-14

## 2021-05-13 RX ORDER — BISACODYL 10 MG
10 SUPPOSITORY, RECTAL RECTAL
Status: DISCONTINUED | OUTPATIENT
Start: 2021-05-13 | End: 2021-05-16 | Stop reason: HOSPADM

## 2021-05-13 RX ADMIN — NITROGLYCERIN 10 MCG/MIN: 20 INJECTION INTRAVENOUS at 20:52

## 2021-05-13 RX ADMIN — FUROSEMIDE 60 MG: 10 INJECTION, SOLUTION INTRAMUSCULAR; INTRAVENOUS at 19:39

## 2021-05-13 RX ADMIN — CARVEDILOL 3.12 MG: 3.12 TABLET, FILM COATED ORAL at 22:57

## 2021-05-13 RX ADMIN — NITROGLYCERIN 0.4 MG: 0.4 TABLET, ORALLY DISINTEGRATING SUBLINGUAL at 20:51

## 2021-05-13 RX ADMIN — ATORVASTATIN CALCIUM 40 MG: 40 TABLET, FILM COATED ORAL at 22:57

## 2021-05-13 ASSESSMENT — PATIENT HEALTH QUESTIONNAIRE - PHQ9
SUM OF ALL RESPONSES TO PHQ9 QUESTIONS 1 AND 2: 1
7. TROUBLE CONCENTRATING ON THINGS, SUCH AS READING THE NEWSPAPER OR WATCHING TELEVISION: NOT AT ALL
6. FEELING BAD ABOUT YOURSELF - OR THAT YOU ARE A FAILURE OR HAVE LET YOURSELF OR YOUR FAMILY DOWN: NOT AL ALL
1. LITTLE INTEREST OR PLEASURE IN DOING THINGS: SEVERAL DAYS
SUM OF ALL RESPONSES TO PHQ QUESTIONS 1-9: 3
4. FEELING TIRED OR HAVING LITTLE ENERGY: SEVERAL DAYS
5. POOR APPETITE OR OVEREATING: NOT AT ALL
8. MOVING OR SPEAKING SO SLOWLY THAT OTHER PEOPLE COULD HAVE NOTICED. OR THE OPPOSITE, BEING SO FIGETY OR RESTLESS THAT YOU HAVE BEEN MOVING AROUND A LOT MORE THAN USUAL: NOT AT ALL
9. THOUGHTS THAT YOU WOULD BE BETTER OFF DEAD, OR OF HURTING YOURSELF: NOT AT ALL
2. FEELING DOWN, DEPRESSED, IRRITABLE, OR HOPELESS: NOT AT ALL
3. TROUBLE FALLING OR STAYING ASLEEP OR SLEEPING TOO MUCH: SEVERAL DAYS

## 2021-05-13 ASSESSMENT — ENCOUNTER SYMPTOMS
WEAKNESS: 0
ORTHOPNEA: 1
DOUBLE VISION: 0
SPUTUM PRODUCTION: 0
MEMORY LOSS: 0
CONSTIPATION: 0
HEADACHES: 0
NAUSEA: 0
SHORTNESS OF BREATH: 1
CHILLS: 0
VOMITING: 0
DIZZINESS: 1
COUGH: 0
PALPITATIONS: 0
DEPRESSION: 1
ABDOMINAL PAIN: 0
SORE THROAT: 0
MYALGIAS: 0
BLURRED VISION: 0
INSOMNIA: 1
DIARRHEA: 0
FEVER: 0
NERVOUS/ANXIOUS: 0

## 2021-05-13 ASSESSMENT — LIFESTYLE VARIABLES
AVERAGE NUMBER OF DAYS PER WEEK YOU HAVE A DRINK CONTAINING ALCOHOL: 0
ON A TYPICAL DAY WHEN YOU DRINK ALCOHOL HOW MANY DRINKS DO YOU HAVE: 0
HOW MANY TIMES IN THE PAST YEAR HAVE YOU HAD 5 OR MORE DRINKS IN A DAY: 0
ALCOHOL_USE: NO
DOES PATIENT WANT TO STOP DRINKING: NO
TOTAL SCORE: 0
EVER FELT BAD OR GUILTY ABOUT YOUR DRINKING: NO
EVER HAD A DRINK FIRST THING IN THE MORNING TO STEADY YOUR NERVES TO GET RID OF A HANGOVER: NO
HAVE PEOPLE ANNOYED YOU BY CRITICIZING YOUR DRINKING: NO
HAVE YOU EVER FELT YOU SHOULD CUT DOWN ON YOUR DRINKING: NO
SUBSTANCE_ABUSE: 0
CONSUMPTION TOTAL: NEGATIVE
TOTAL SCORE: 0
TOTAL SCORE: 0

## 2021-05-13 ASSESSMENT — FIBROSIS 4 INDEX
FIB4 SCORE: 1.75
FIB4 SCORE: 1.6

## 2021-05-13 NOTE — ED TRIAGE NOTES
"Chief Complaint   Patient presents with   • Shortness of Breath     x a few days. denies cough. denies pain.     Faroese speaking only, ipad  used.   BP noted.  Pt to have second EKG then go to room.  Protocol ordered.    BP (!) 194/100   Pulse 81   Temp 36.7 °C (98.1 °F) (Temporal)   Resp 16   Ht 1.702 m (5' 7\")   Wt 90.2 kg (198 lb 13.7 oz)   SpO2 92%   BMI 31.15 kg/m²     "

## 2021-05-14 ENCOUNTER — APPOINTMENT (OUTPATIENT)
Dept: CARDIOLOGY | Facility: MEDICAL CENTER | Age: 66
DRG: 291 | End: 2021-05-14
Attending: STUDENT IN AN ORGANIZED HEALTH CARE EDUCATION/TRAINING PROGRAM
Payer: MEDICARE

## 2021-05-14 PROBLEM — J96.01 ACUTE HYPOXEMIC RESPIRATORY FAILURE (HCC): Status: ACTIVE | Noted: 2021-05-14

## 2021-05-14 LAB
ALBUMIN SERPL BCP-MCNC: 3.9 G/DL (ref 3.2–4.9)
ALBUMIN/GLOB SERPL: 1.5 G/DL
ALP SERPL-CCNC: 132 U/L (ref 30–99)
ALT SERPL-CCNC: 50 U/L (ref 2–50)
ANION GAP SERPL CALC-SCNC: 16 MMOL/L (ref 7–16)
AST SERPL-CCNC: 36 U/L (ref 12–45)
BASOPHILS # BLD AUTO: 0.5 % (ref 0–1.8)
BASOPHILS # BLD: 0.05 K/UL (ref 0–0.12)
BILIRUB SERPL-MCNC: 1.2 MG/DL (ref 0.1–1.5)
BUN SERPL-MCNC: 16 MG/DL (ref 8–22)
CALCIUM SERPL-MCNC: 8.8 MG/DL (ref 8.5–10.5)
CHLORIDE SERPL-SCNC: 98 MMOL/L (ref 96–112)
CO2 SERPL-SCNC: 25 MMOL/L (ref 20–33)
CREAT SERPL-MCNC: 0.65 MG/DL (ref 0.5–1.4)
EOSINOPHIL # BLD AUTO: 0.79 K/UL (ref 0–0.51)
EOSINOPHIL NFR BLD: 8.3 % (ref 0–6.9)
ERYTHROCYTE [DISTWIDTH] IN BLOOD BY AUTOMATED COUNT: 44.5 FL (ref 35.9–50)
GLOBULIN SER CALC-MCNC: 2.6 G/DL (ref 1.9–3.5)
GLUCOSE BLD-MCNC: 259 MG/DL (ref 65–99)
GLUCOSE BLD-MCNC: 266 MG/DL (ref 65–99)
GLUCOSE BLD-MCNC: 288 MG/DL (ref 65–99)
GLUCOSE BLD-MCNC: 311 MG/DL (ref 65–99)
GLUCOSE SERPL-MCNC: 202 MG/DL (ref 65–99)
HCT VFR BLD AUTO: 42 % (ref 42–52)
HGB BLD-MCNC: 13.9 G/DL (ref 14–18)
IMM GRANULOCYTES # BLD AUTO: 0.03 K/UL (ref 0–0.11)
IMM GRANULOCYTES NFR BLD AUTO: 0.3 % (ref 0–0.9)
LV EJECT FRACT  99904: 35
LV EJECT FRACT MOD 2C 99903: 42.6
LV EJECT FRACT MOD 4C 99902: 49.45
LV EJECT FRACT MOD BP 99901: 45
LYMPHOCYTES # BLD AUTO: 1.98 K/UL (ref 1–4.8)
LYMPHOCYTES NFR BLD: 20.7 % (ref 22–41)
MAGNESIUM SERPL-MCNC: 1.2 MG/DL (ref 1.5–2.5)
MCH RBC QN AUTO: 29.2 PG (ref 27–33)
MCHC RBC AUTO-ENTMCNC: 33.1 G/DL (ref 33.7–35.3)
MCV RBC AUTO: 88.2 FL (ref 81.4–97.8)
MONOCYTES # BLD AUTO: 1.13 K/UL (ref 0–0.85)
MONOCYTES NFR BLD AUTO: 11.8 % (ref 0–13.4)
NEUTROPHILS # BLD AUTO: 5.59 K/UL (ref 1.82–7.42)
NEUTROPHILS NFR BLD: 58.4 % (ref 44–72)
NRBC # BLD AUTO: 0 K/UL
NRBC BLD-RTO: 0 /100 WBC
NT-PROBNP SERPL IA-MCNC: 1443 PG/ML (ref 0–125)
PLATELET # BLD AUTO: 214 K/UL (ref 164–446)
PMV BLD AUTO: 11.2 FL (ref 9–12.9)
POTASSIUM SERPL-SCNC: 3.5 MMOL/L (ref 3.6–5.5)
PROT SERPL-MCNC: 6.5 G/DL (ref 6–8.2)
RBC # BLD AUTO: 4.76 M/UL (ref 4.7–6.1)
SODIUM SERPL-SCNC: 139 MMOL/L (ref 135–145)
WBC # BLD AUTO: 9.6 K/UL (ref 4.8–10.8)

## 2021-05-14 PROCEDURE — 93306 TTE W/DOPPLER COMPLETE: CPT

## 2021-05-14 PROCEDURE — 85025 COMPLETE CBC W/AUTO DIFF WBC: CPT

## 2021-05-14 PROCEDURE — A9270 NON-COVERED ITEM OR SERVICE: HCPCS | Performed by: STUDENT IN AN ORGANIZED HEALTH CARE EDUCATION/TRAINING PROGRAM

## 2021-05-14 PROCEDURE — 94760 N-INVAS EAR/PLS OXIMETRY 1: CPT

## 2021-05-14 PROCEDURE — 94640 AIRWAY INHALATION TREATMENT: CPT

## 2021-05-14 PROCEDURE — 36415 COLL VENOUS BLD VENIPUNCTURE: CPT

## 2021-05-14 PROCEDURE — 99233 SBSQ HOSP IP/OBS HIGH 50: CPT | Performed by: STUDENT IN AN ORGANIZED HEALTH CARE EDUCATION/TRAINING PROGRAM

## 2021-05-14 PROCEDURE — 80053 COMPREHEN METABOLIC PANEL: CPT

## 2021-05-14 PROCEDURE — 82962 GLUCOSE BLOOD TEST: CPT | Mod: 91

## 2021-05-14 PROCEDURE — 83735 ASSAY OF MAGNESIUM: CPT

## 2021-05-14 PROCEDURE — 83880 ASSAY OF NATRIURETIC PEPTIDE: CPT

## 2021-05-14 PROCEDURE — 93306 TTE W/DOPPLER COMPLETE: CPT | Mod: 26 | Performed by: INTERNAL MEDICINE

## 2021-05-14 PROCEDURE — 700102 HCHG RX REV CODE 250 W/ 637 OVERRIDE(OP): Performed by: STUDENT IN AN ORGANIZED HEALTH CARE EDUCATION/TRAINING PROGRAM

## 2021-05-14 PROCEDURE — 700111 HCHG RX REV CODE 636 W/ 250 OVERRIDE (IP): Performed by: STUDENT IN AN ORGANIZED HEALTH CARE EDUCATION/TRAINING PROGRAM

## 2021-05-14 PROCEDURE — 700101 HCHG RX REV CODE 250: Performed by: STUDENT IN AN ORGANIZED HEALTH CARE EDUCATION/TRAINING PROGRAM

## 2021-05-14 PROCEDURE — 770020 HCHG ROOM/CARE - TELE (206)

## 2021-05-14 RX ORDER — FUROSEMIDE 10 MG/ML
40 INJECTION INTRAMUSCULAR; INTRAVENOUS
Status: DISCONTINUED | OUTPATIENT
Start: 2021-05-14 | End: 2021-05-16

## 2021-05-14 RX ORDER — SPIRONOLACTONE 25 MG/1
25 TABLET ORAL DAILY
Status: DISCONTINUED | OUTPATIENT
Start: 2021-05-15 | End: 2021-05-16 | Stop reason: HOSPADM

## 2021-05-14 RX ORDER — LOSARTAN POTASSIUM 50 MG/1
50 TABLET ORAL DAILY
Status: DISCONTINUED | OUTPATIENT
Start: 2021-05-14 | End: 2021-05-16 | Stop reason: HOSPADM

## 2021-05-14 RX ORDER — IPRATROPIUM BROMIDE AND ALBUTEROL SULFATE 2.5; .5 MG/3ML; MG/3ML
3 SOLUTION RESPIRATORY (INHALATION)
Status: DISPENSED | OUTPATIENT
Start: 2021-05-14 | End: 2021-05-15

## 2021-05-14 RX ORDER — CLOPIDOGREL BISULFATE 75 MG/1
75 TABLET ORAL DAILY
Status: DISCONTINUED | OUTPATIENT
Start: 2021-05-14 | End: 2021-05-16 | Stop reason: HOSPADM

## 2021-05-14 RX ORDER — AMLODIPINE BESYLATE 5 MG/1
5 TABLET ORAL
Status: DISCONTINUED | OUTPATIENT
Start: 2021-05-14 | End: 2021-05-15

## 2021-05-14 RX ORDER — IPRATROPIUM BROMIDE AND ALBUTEROL SULFATE 2.5; .5 MG/3ML; MG/3ML
3 SOLUTION RESPIRATORY (INHALATION)
Status: DISCONTINUED | OUTPATIENT
Start: 2021-05-14 | End: 2021-05-16 | Stop reason: HOSPADM

## 2021-05-14 RX ORDER — HYDRALAZINE HYDROCHLORIDE 20 MG/ML
10 INJECTION INTRAMUSCULAR; INTRAVENOUS EVERY 6 HOURS PRN
Status: DISCONTINUED | OUTPATIENT
Start: 2021-05-14 | End: 2021-05-16 | Stop reason: HOSPADM

## 2021-05-14 RX ADMIN — Medication 1000 UNITS: at 06:25

## 2021-05-14 RX ADMIN — INSULIN LISPRO 3 UNITS: 100 INJECTION, SOLUTION INTRAVENOUS; SUBCUTANEOUS at 12:46

## 2021-05-14 RX ADMIN — AMLODIPINE BESYLATE 5 MG: 5 TABLET ORAL at 12:46

## 2021-05-14 RX ADMIN — FUROSEMIDE 60 MG: 10 INJECTION, SOLUTION INTRAMUSCULAR; INTRAVENOUS at 06:25

## 2021-05-14 RX ADMIN — SPIRONOLACTONE 12.5 MG: 25 TABLET ORAL at 06:24

## 2021-05-14 RX ADMIN — INSULIN LISPRO 3 UNITS: 100 INJECTION, SOLUTION INTRAVENOUS; SUBCUTANEOUS at 18:02

## 2021-05-14 RX ADMIN — DOCUSATE SODIUM 50 MG AND SENNOSIDES 8.6 MG 2 TABLET: 8.6; 5 TABLET, FILM COATED ORAL at 06:32

## 2021-05-14 RX ADMIN — CLOPIDOGREL BISULFATE 75 MG: 75 TABLET ORAL at 12:46

## 2021-05-14 RX ADMIN — POTASSIUM CHLORIDE 40 MEQ: 1500 TABLET, EXTENDED RELEASE ORAL at 06:25

## 2021-05-14 RX ADMIN — ENOXAPARIN SODIUM 40 MG: 40 INJECTION SUBCUTANEOUS at 06:25

## 2021-05-14 RX ADMIN — INSULIN LISPRO 4 UNITS: 100 INJECTION, SOLUTION INTRAVENOUS; SUBCUTANEOUS at 21:15

## 2021-05-14 RX ADMIN — FUROSEMIDE 40 MG: 10 INJECTION, SOLUTION INTRAMUSCULAR; INTRAVENOUS at 18:04

## 2021-05-14 RX ADMIN — METOPROLOL SUCCINATE 75 MG: 50 TABLET, EXTENDED RELEASE ORAL at 18:03

## 2021-05-14 RX ADMIN — ATORVASTATIN CALCIUM 40 MG: 40 TABLET, FILM COATED ORAL at 06:24

## 2021-05-14 RX ADMIN — INSULIN LISPRO 3 UNITS: 100 INJECTION, SOLUTION INTRAVENOUS; SUBCUTANEOUS at 08:39

## 2021-05-14 RX ADMIN — ATORVASTATIN CALCIUM 40 MG: 40 TABLET, FILM COATED ORAL at 18:03

## 2021-05-14 RX ADMIN — IPRATROPIUM BROMIDE AND ALBUTEROL SULFATE 3 ML: .5; 2.5 SOLUTION RESPIRATORY (INHALATION) at 19:46

## 2021-05-14 RX ADMIN — INSULIN HUMAN 20 UNITS: 100 INJECTION, SUSPENSION SUBCUTANEOUS at 21:14

## 2021-05-14 RX ADMIN — LOSARTAN POTASSIUM 50 MG: 50 TABLET, FILM COATED ORAL at 04:37

## 2021-05-14 RX ADMIN — ASPIRIN 81 MG: 81 TABLET, COATED ORAL at 06:24

## 2021-05-14 ASSESSMENT — COGNITIVE AND FUNCTIONAL STATUS - GENERAL
DAILY ACTIVITIY SCORE: 24
SUGGESTED CMS G CODE MODIFIER MOBILITY: CH
SUGGESTED CMS G CODE MODIFIER DAILY ACTIVITY: CH
MOBILITY SCORE: 24

## 2021-05-14 ASSESSMENT — PAIN DESCRIPTION - PAIN TYPE: TYPE: ACUTE PAIN

## 2021-05-14 ASSESSMENT — ENCOUNTER SYMPTOMS
PND: 0
SHORTNESS OF BREATH: 1
ORTHOPNEA: 1

## 2021-05-14 NOTE — ASSESSMENT & PLAN NOTE
Lower than previous levels. Likely demand ischemia, sec to CHF exacerbation  Denies active chest pain

## 2021-05-14 NOTE — PROGRESS NOTES
· 2 RN skin check complete with LAVERN Riley.  · Devices in place nasal cannula, tele box, PIV.  · Confirmed pressure ulcers found on N/A.  · New potential pressure ulcers noted on N/A.    · The following interventions in place Patient turns self in bed, pillows provided for support, silicone oxygen tubing in place.    Patient has general bruising and old scars on his body. No open skin,

## 2021-05-14 NOTE — PROGRESS NOTES
Patient transferred from the ED with all personal belongings. AxO 4, O2 2L thru nasal cannula. Call light and personal belongings within reach. Tele box placed, monitor room notified. Hourly rounding in place. No needs at this time.

## 2021-05-14 NOTE — PROGRESS NOTES
Spiritual Care Note    Patient Information     Patient's Name: Chino Deluca   MRN: [id number]    YOB: 1955   Age and Gender: 66 y.o. male   Service Area: [address]   Room (and Bed): T716/   Ethnicity or Nationality:     Primary Language: Divehi   Christianity/Spiritual preference: Islam   Place of Residence: [place]   Family/Friends/Others Present: no   Clinical Team Present: no   Medical Diagnosis(-es)/Procedure(s): Acute on chronic HFrEF (heart failure with reduced ejection fraction) ([facility], located within [facility])   Code Status: Full Code    Date of Admission: 5/13/2021   Length of Stay: 1 days        Spiritual Care Provider Information:  Name of Spiritual Care Provider: Tea Mcdermott  Title of Spiritual Care Provider: Associate Christensen  Phone Number: [phone]  E-mail: [email or web address]  Total time : 15 minutes    Spiritual Screen Results:    Gen Nursing  Spiritual Screen  Was spiritual care education provided to the patient?: Yes     Palliative Care  PC Christianity/Spiritual Screening  Was spiritual care education provided to the patient?: Yes      Encounter/Request Information  Encounter/Request Type     Visited With: Patient, Health care provider    Nature of the Visit: Initial, On shift    General Visit: Yes    Referral From/ Origin of Request: Epic nursing    Referral To: Community clergy (REFERRED TO FR. GODFREY TODAY @ 1620 FOR VISIT EITHER TONIGHT OR TOMORROW)    Religous Needs/Values  Christianity Needs Visit    Christianity Needs: Prayer    Spiritual Assessment   Spiritual Care Encounters    Observations/Symptoms: Accepting, Thankfulness    Interacton/Conversation:  checked in with RN then introduced self to pt.  Pt was very pleasant -  could communicate with  on phone regarding pt's spiritual care needs.  Pt confirmed desire for prayer with a .   referred pt's request to Fr. Godfrey.  Pt thanked  for  visit.    Assessment: Need    Need: Seeking Spiritual Assistance and Support    Intended Effects: Tammie Affirmation, Demonstrate Caring and Concern    Interventions: Spiritual Support,  referral    Outcomes: Spiritual Comfort    Plan:  ( to f/u)    Notes:

## 2021-05-14 NOTE — ED NOTES
Med rec complete per interview with pt at bedside and phone call with [facility]. Allergies reviewed. Pt denies antibiotic use in the past 14 days.

## 2021-05-14 NOTE — PROGRESS NOTES
Received report from NOC RN. Assumed care of patient at 0700. Patient A&Ox 4, speaking in full sentences, follows commands and responds appropriately to questions. On 2L NC, no signs of respiratory distress. Respirations are even and unlabored. Patient states no pain at this time. POC discussed and agreed upon with patient. Call light and belongings within reach. Bed in lowest locked position. Upper side rails raised. Fall risk precautions in place. Hourly rounding. Will continue to monitor intake and output.

## 2021-05-14 NOTE — ASSESSMENT & PLAN NOTE
SPO2 of 87% on room air requiring 2 LPM oxygen for SPO2 greater than 90%.  Secondary to acute exacerbation of HFrEF and fluid overload.    Aggressive diuresis   Oxygen as per respiratory protocol.  Nitroglycerin drip in setting of pulmonary edema, discontinued on 5/14  Wean O2 as tolerated  I&O

## 2021-05-14 NOTE — PROGRESS NOTES
[facility] Medicine Daily Progress Note    Date of Service  5/14/2021    Chief Complaint  66 y.o. male admitted 5/13/2021 with   Chief Complaint   Patient presents with   • Shortness of Breath     x a few days. denies cough. denies pain.       Hospital Course  66 y.o. male with a history of CAD s/p high risk PCI 8/19/2020 successful PCI/CARLOS PDA, successful PTCA/PCI/CARLOS mid to distal LAD, diabetes mellitus type 2, hypertension, mitral valve replacement in 2016, HFrEF 25%, who presented 5/13/2021 with worsening shortness of breath for 4 days, worsening laying down and exertions.       Of note, he has no insurance and reports he has not been taking Brilinta for a while due to cost issue. Has not following PCP and cardiology due to finance.      proBNP of 1836 with a troponin T of 27.  EKG showed no significant ischemic changes.  Chest x-ray showed interstitial infiltrates bilaterally consistent with pulmonary edema.      Bp noted 205/98 on admission. Start on nitro gtt and iv lasix.  '  A1c 10.     I discussed with cardiology Dr. Small, due to recent stenting and unable to afford Brilinta, ok to switch to plavix. Plavix ordered.    Dc Nitro gtt.     Aggressive bp managements.    Interval Problem Update  Patient was seen and examined at the bedside.  Reports sob improving, sleeps on 1 pillow. Sob occurs with exertions.  Leg swelling improving.  No chest pain, fever, chills, chest pain,nausea, vomiting, abdominal pain   services were used in the patient's primary language of Romanian.  Mode of interpretation: iPad  Content of Interpretation: History of present illness, review of systems, physical exam, explanation of care, and questions.    All Data, Medication data reviewed.  Case discussed with nursing, CM,  nurse, pharmacy in IDT rounds.     Consultants/Specialty  none    Code Status  Full Code    Disposition  TBD    Review of Systems  Review of Systems   Respiratory: Positive for shortness of  breath.    Cardiovascular: Positive for orthopnea and leg swelling. Negative for chest pain and PND.   All other systems reviewed and are negative.       Physical Exam  Temp:  [35.9 °C (96.7 °F)-36.7 °C (98.1 °F)] 35.9 °C (96.7 °F)  Pulse:  [63-81] 75  Resp:  [16-22] 18  BP: (154-205)/() 154/83  SpO2:  [87 %-96 %] 94 %    Physical Exam  Vitals and nursing note reviewed.   Constitutional:       General: He is not in acute distress.     Appearance: Normal appearance. He is obese. He is ill-appearing.   HENT:      Head: Normocephalic and atraumatic.      Mouth/Throat:      Pharynx: Oropharynx is clear.   Eyes:      Pupils: Pupils are equal, round, and reactive to light.   Cardiovascular:      Rate and Rhythm: Normal rate and regular rhythm.   Pulmonary:      Effort: Pulmonary effort is normal.      Breath sounds: Rales present.   Abdominal:      General: Abdomen is flat. Bowel sounds are normal.      Palpations: Abdomen is soft.   Musculoskeletal:         General: Normal range of motion.   Skin:     General: Skin is warm.   Neurological:      General: No focal deficit present.      Mental Status: He is alert and oriented to person, place, and time.   Psychiatric:         Mood and Affect: Mood normal.         Behavior: Behavior normal.         Fluids    Intake/Output Summary (Last 24 hours) at 5/14/2021 1152  Last data filed at 5/14/2021 0940  Gross per 24 hour   Intake 240 ml   Output 1950 ml   Net -1710 ml       Laboratory  Recent Labs     05/13/21  1648 05/14/21  0551   WBC 10.0 9.6   RBC 4.64* 4.76   HEMOGLOBIN 13.7* 13.9*   HEMATOCRIT 41.1* 42.0   MCV 88.6 88.2   MCH 29.5 29.2   MCHC 33.3* 33.1*   RDW 45.8 44.5   PLATELETCT 227 214   MPV 10.6 11.2     Recent Labs     05/13/21  1648 05/14/21  0551   SODIUM 142 139   POTASSIUM 3.9 3.5*   CHLORIDE 104 98   CO2 26 25   GLUCOSE 168* 202*   BUN 17 16   CREATININE 0.83 0.65   CALCIUM 8.8 8.8                   Imaging  EC-ECHOCARDIOGRAM COMPLETE W/O CONT   Final  Result      DX-CHEST-PORTABLE (1 VIEW)   Final Result      1.  Linear bibasilar opacities are likely due to atelectasis.   2.  There may be mild vascular congestion.           Assessment/Plan  * Acute on chronic HFrEF (heart failure with reduced ejection fraction) (HCC)- (present on admission)  Assessment & Plan  TTE: EF 35%, slight imporved from EF 25% in 8/2020    Continue aggressive diuresis   Cont Toporol XL, losartan and aldactone.  Daily weights and strict ins and outs  Low salt diet    Acute hypoxemic respiratory failure (HCC)- (present on admission)  Assessment & Plan  SPO2 of 87% on room air requiring 2 LPM oxygen for SPO2 greater than 90%.  Secondary to acute exacerbation of HFrEF and fluid overload.    Aggressive diuresis   Oxygen as per respiratory protocol.  Nitroglycerin drip in setting of pulmonary edema, discontinued on 5/14  Wean O2 as tolerated  I&O    Status post insertion of drug eluting coronary artery stent- (present on admission)  Assessment & Plan  high risk PCI 8/19/2020 successful PCI/CARLOS PDA, successful PTCA/PCI/CARLOS mid to distal LAD      Hypertensive urgency- (present on admission)  Assessment & Plan  Likely secondary to fluid overload.    Start nitroglycerin sublingual tablets and drip for SBP goal less than 160. Discontinued on 5/14  Continue home losartan and spironolactone, toporol XL  Add amlodipine    Coronary artery disease involving native coronary artery of native heart without angina pectoris- (present on admission)  Assessment & Plan  Status post high risk PCI 8/19/2020 successful PCI/CARLOS PDA, successful PTCA/PCI/CARLOS mid to distal LAD,    Continue home statin, ASA, Toporol XL  He was discharged with Brilinta 8/2020, however due to finance issue, he has been off it for a while. I discussed with cardiology Dr. Small, due to recent stenting and unable to afford Brilinta, ok to switch to plavix.   Start plavix       Elevated troponin- (present on admission)  Assessment &  Plan  Lower than previous levels. Likely demand ischemia, sec to CHF exacerbation  Denies active chest pain    HLD (hyperlipidemia)- (present on admission)  Assessment & Plan  Continue home statin.    Type 2 diabetes mellitus with hyperglycemia, with long-term current use of insulin (HCC)- (present on admission)  Assessment & Plan  Poorly controlled.    A1c 10  Cont insulin 70/30 and SSI and hypoglycemic protocol  Accucheck       VTE prophylaxis: lovenox

## 2021-05-14 NOTE — PROGRESS NOTES
Overnight Hospitalist Note      Called by lab due to positive blood culture 1/2 gram-positive cocci, which likely represents contamination.  Patient was treated with 1 day of azithromycin and ceftriaxone.  He was discharged yesterday.

## 2021-05-14 NOTE — H&P
Hospital Medicine History & Physical Note    Date of Service  5/13/2021    Primary Care Physician  Diana Negro, SANDRO.    Consultants       services were used in the patient's primary language of Upper sorbian.     Name or Number: Clifton Newton [id number]  Mode of interpretation: iPad    Content of Interpretation:  History of present illness, review of systems, physical exam, explanation of care, and questions.      Code Status  Full Code    Chief Complaint  Chief Complaint   Patient presents with   • Shortness of Breath     x a few days. denies cough. denies pain.       History of Presenting Illness  66 y.o. male with a history of diabetes mellitus type 2, hypertension, hyperlipidemia, heart valve replacement, HFpEF 25% presumably from ischemic cardiomyopathy and valvular disease, who presented 5/13/2021 with worsening shortness of breath for 4 days.  Patient reports dates not been able to sleep because of shortness of breath.  The shortness of breath is worse when he lays down and improved when he gets up and starts working.  He is currently working in [employer].  He reports that he had a heart valve replaced in 2016.  Patient reports that sometimes he has to rest during work due to lightheadedness and dizziness, which improved with sitting down.  He denies any fevers or chills.  He denies any sore throat or cough.  He last had an echocardiogram here in August 2020, which showed EF of 25% with known mitral valve replacement functioning normally with appropriate transvalvular gradient.  Also noted was aortic sclerosis without stenosis.  Could not determine diastolic function due to mitral valve disease.  Patient also underwent cardiac stenting of the posterior descending artery with a drug-eluting stent in August 2020.    Patient reports that he has been compliant with his heart failure medications including losartan and metoprolol SR along with his diabetic medications.  He denies taking  any diuretics.  Denies any alcohol or smoking.  No illicit drug use.    In the ER, patient had a NT proBNP of 1836 with a troponin T of 27.  EKG showed no significant ischemic changes.  Chest x-ray showed interstitial infiltrates bilaterally consistent with pulmonary edema.  He was started on furosemide 60 mg IV in the ER.  COVID-19 testing was negative.  His blood pressure was noted to be 194/100 when he came in, which continued to increase up to 205/98.  Patient reports that he does not check his blood pressure at home.    Review of Systems  Review of Systems   Constitutional: Negative for chills and fever.   HENT: Negative for ear pain and sore throat.    Eyes: Negative for blurred vision and double vision.   Respiratory: Positive for shortness of breath. Negative for cough and sputum production.    Cardiovascular: Positive for orthopnea. Negative for chest pain and palpitations.   Gastrointestinal: Negative for abdominal pain, constipation, diarrhea, nausea and vomiting.   Genitourinary: Negative for dysuria and frequency.   Musculoskeletal: Negative for joint pain and myalgias.   Skin: Negative for itching and rash.   Neurological: Positive for dizziness. Negative for weakness and headaches.   Psychiatric/Behavioral: Positive for depression. Negative for memory loss, substance abuse and suicidal ideas. The patient has insomnia. The patient is not nervous/anxious.        Past Medical History   has a past medical history of Cataract, Diabetes ([facility]), Full dentures, Glaucoma, Heart valve disease, HFrEF (heart failure with reduced ejection fraction) ([facility]), High cholesterol, Hypertension, Pneumonia, and Snoring.    Surgical History   has a past surgical history that includes other; other; other cardiac surgery; and vitrectomy posterior (Left, 6/9/2020).     Family History  family history includes Diabetes in his father; No Known Problems in his mother.     Social History   reports that he has never smoked. He has  never used smokeless tobacco. He reports that he does not drink alcohol and does not use drugs.    Allergies  No Known Allergies    Medications  Prior to Admission Medications   Prescriptions Last Dose Informant Patient Reported? Taking?   FARXIGA 5 MG Tab 5/13/2021 at AM Patient's Home Pharmacy Yes No   Sig: TAKE 1 TABLET BY MOUTH ONCE DAILY IN THE MORNING FOR DIABETES   aspirin EC 81 MG EC tablet 5/12/2021 at PM Patient's Home Pharmacy No No   Sig: Take 1 Tab by mouth every day.   atorvastatin (LIPITOR) 40 MG Tab 5/13/2021 at AM Patient's Home Pharmacy Yes Yes   Sig: Take 40 mg by mouth 2 times a day.   atorvastatin (LIPITOR) 80 MG tablet Not Taking at Unknown time Patient's Home Pharmacy No No   Sig: Take 1 Tab by mouth every evening.   Patient not taking: Reported on 5/13/2021   ibuprofen (MOTRIN) 200 MG Tab 5/13/2021 at AM Patient's Home Pharmacy Yes Yes   Sig: Take 400 mg by mouth 2 times a day as needed.   insulin 70/30 (HUMULIN/NOVOLIN) (70-30) 100 UNIT/ML Suspension 5/12/2021 at PM Patient's Home Pharmacy No No   Sig: Inject 15 Units as instructed 2 Times a Day.   Patient taking differently: Inject 30 Units under the skin at bedtime.   losartan (COZAAR) 50 MG Tab 5/13/2021 at AM Patient's Home Pharmacy No No   Sig: Take 1 Tab by mouth every evening.   metFORMIN (GLUCOPHAGE) 500 MG Tab 5/13/2021 at AM Patient's Home Pharmacy Yes No   Sig: Take 1,000 mg by mouth 2 times a day.   metoprolol SR (TOPROL XL) 25 MG TABLET SR 24 HR 5/13/2021 at AM Patient's Home Pharmacy No No   Sig: Take 3 Tabs by mouth every evening.   spironolactone (ALDACTONE) 25 MG Tab 5/13/2021 at AM Patient's Home Pharmacy No No   Sig: Take 0.5 Tabs by mouth every day.   ticagrelor (BRILINTA) 90 MG Tab tablet Not Taking at Unknown time Patient's Home Pharmacy No No   Sig: Take 1 Tab by mouth 2 Times a Day.   Patient not taking: Reported on 5/13/2021   vitamin D (CHOLECALCIFEROL) 1000 Unit (25 mcg) Tab 5/13/2021 at AM Patient's Home Pharmacy  Yes Yes   Sig: Take 1,000 Units by mouth every day.      Facility-Administered Medications: None       Physical Exam  Temp:  [36.7 °C (98.1 °F)] 36.7 °C (98.1 °F)  Pulse:  [63-81] 79  Resp:  [16-22] 20  BP: (167-205)/() 167/88  SpO2:  [87 %-96 %] 91 %    Physical Exam  Vitals and nursing note reviewed.   Constitutional:       General: He is not in acute distress.     Appearance: Normal appearance. He is well-developed. He is not diaphoretic.   HENT:      Head: Normocephalic and atraumatic.      Right Ear: External ear normal.      Left Ear: External ear normal.      Nose: Nose normal.      Mouth/Throat:      Pharynx: Oropharynx is clear. No oropharyngeal exudate or posterior oropharyngeal erythema.   Eyes:      General: No scleral icterus.        Right eye: No discharge.         Left eye: No discharge.      Conjunctiva/sclera: Conjunctivae normal.      Pupils: Pupils are equal, round, and reactive to light.   Neck:      Thyroid: No thyromegaly.      Vascular: No JVD.      Trachea: No tracheal deviation.   Cardiovascular:      Rate and Rhythm: Normal rate and regular rhythm.      Pulses: Normal pulses.      Heart sounds: Normal heart sounds. No murmur heard.   No friction rub. No gallop.    Pulmonary:      Effort: Pulmonary effort is normal. No respiratory distress.      Breath sounds: No stridor. Rales (Mild inspiratory diffuse crackles bilaterally) present. No wheezing.   Abdominal:      General: Bowel sounds are normal. There is no distension.      Palpations: Abdomen is soft. There is no mass.      Tenderness: There is no abdominal tenderness. There is no guarding or rebound.   Musculoskeletal:         General: No tenderness or deformity.      Cervical back: Neck supple. No tenderness.      Right lower leg: No edema.      Left lower leg: No edema.   Lymphadenopathy:      Cervical: No cervical adenopathy.   Skin:     General: Skin is warm and dry.      Coloration: Skin is not pale.      Findings: No erythema  or rash.   Neurological:      Mental Status: He is alert and oriented to person, place, and time.      Sensory: No sensory deficit.      Motor: No weakness or abnormal muscle tone.   Psychiatric:         Behavior: Behavior normal.         Thought Content: Thought content normal.         Judgment: Judgment normal.         Laboratory:  Recent Labs     05/13/21  1648   WBC 10.0   RBC 4.64*   HEMOGLOBIN 13.7*   HEMATOCRIT 41.1*   MCV 88.6   MCH 29.5   MCHC 33.3*   RDW 45.8   PLATELETCT 227   MPV 10.6     Recent Labs     05/13/21  1648   SODIUM 142   POTASSIUM 3.9   CHLORIDE 104   CO2 26   GLUCOSE 168*   BUN 17   CREATININE 0.83   CALCIUM 8.8     Recent Labs     05/13/21  1648   ALTSGPT 61*   ASTSGOT 43   ALKPHOSPHAT 154*   TBILIRUBIN 0.8   GLUCOSE 168*         Recent Labs     05/13/21  1733   NTPROBNP 1836*         Recent Labs     05/13/21  1648   TROPONINT 27*       Imaging:  DX-CHEST-PORTABLE (1 VIEW)   Final Result      1.  Linear bibasilar opacities are likely due to atelectasis.   2.  There may be mild vascular congestion.        Chest x-ray per my read shows interstitial infiltrates bilaterally consistent with mild pulmonary edema.      EKG per my read shows normal sinus rhythm with heart rate of 79, QTc 482, no significant ST elevation or depression.    Assessment/Plan:  I anticipate this patient will require at least two midnights for appropriate medical management, necessitating inpatient admission.    * Acute on chronic HFrEF (heart failure with reduced ejection fraction) ([facility])- (present on admission)  Assessment & Plan  I have discussed the patient's case with the ER physician, Dr. Dalton, and agree with the need to admit the patient for further management of what appears to be exacerbation of chronic HFrEF previously 25% EF.  Elevated NT pro BNP as well as pulmonary interstitial infiltrates are suggestive of fluid overload in setting of heart failure.  His blood pressures are currently  uncontrolled.    Admit to telemetry.  Continue aggressive diuresis with furosemide 60 mg IV twice daily diuretic.  Start nitroglycerin drip for SBP goal less than 160.  Change metoprolol SR to carvedilol 3.125 mg by mouth twice daily.  Continue home spironolactone and losartan.  Repeat echocardiogram.  Daily weights and strict ins and outs.    Acute hypoxemic respiratory failure (HCC)- (present on admission)  Assessment & Plan  SPO2 of 87% on room air requiring 2 LPM oxygen for SPO2 greater than 90%.  Secondary to acute exacerbation of HFpEF and fluid overload.    Aggressive diuresis as per above.  Oxygen as per respiratory protocol.  Nitroglycerin drip in setting of pulmonary edema.  Incentive spirometry.  DuoNebs every 4 hours and as needed.    Hypertensive urgency- (present on admission)  Assessment & Plan  Likely secondary to fluid overload.    Start nitroglycerin sublingual tablets and drip for SBP goal less than 160.  Change home metoprolol SR to carvedilol.  Continue home losartan and spironolactone.    Status post insertion of drug eluting coronary artery stent- (present on admission)  Assessment & Plan  In August 2022 posterior descending artery.  No longer taking Brilinta.    Continue home aspirin.    Coronary artery disease involving native coronary artery of native heart without angina pectoris- (present on admission)  Assessment & Plan  As per history.  Status post stenting 8/2020 with drug-eluting stent    Continue home statin.  Change home metoprolol SR to carvedilol.    Elevated troponin- (present on admission)  Assessment & Plan  Lower than previous levels.    No need to monitor.    HLD (hyperlipidemia)- (present on admission)  Assessment & Plan  As per history.    - currently on: atorvastatin 40 mg Standard BID Times    Continue home statin.    Type 2 diabetes mellitus with hyperglycemia, with long-term current use of insulin (HCC)- (present on admission)  Assessment & Plan  Poorly controlled.       - most recent hemoglobin A1c: 10.1 % (08/15/2020 3:55:00)  - blood glucose since admission controlled (range <180 mg/dL)  - currently on: insulin lispro 100 UNT/ML q6h    Lispro insulin sign scale with hypoglycemia protocol.  Diabetic sodium restricted diet.  Repeat A1c.    Patient is critically ill.   The patient continues to have: Acute hypoxemic respiratory failure with fluid overload and pulmonary edema in setting of acute exacerbation of HFpEF.  Hypertensive urgency.  The vital organ system that is affected is the: Cardiopulmonary system  If untreated there is a high chance of deterioration into: Hypoxemia, cardiopulmonary arrest, and eventually death.   The critical care that I am providing today is: Aggressive diuresis with furosemide and initiation of nitroglycerin drip and sublinqually for hypertensive urgency as well as for pulmonary edema and acute hypoxemic respiratory failure.  I have initiated respiratory therapies as well as oxygen for his hypoxemia.  I have reviewed the patient's vital signs as well as laboratories studies closely and repeatedly monitoring improvement of blood pressures as well as hypoxia with initiation of above therapies.  The critical that has been undertaken is medically complex.   There has been no overlap in critical care time.   Critical Care Time not including procedures: 35 minutes

## 2021-05-14 NOTE — CARE PLAN
Problem: Knowledge Deficit - Standard  Goal: Patient and family/care givers will demonstrate understanding of plan of care, disease process/condition, diagnostic tests and medications  Note: Updated on plan of care.  used. Encouraged to ask questions and voice concerns. Questions and concerns addressed.     Problem: Fall Risk  Goal: Patient will remain free from falls  Note: Encouraged to use call light when assistance needed. Room close to nursing station. Hourly rounding in place.   The patient is Stable - Low risk of patient condition declining or worsening         Progress made toward(s) clinical / shift goals:  progressing    Patient is not progressing towards the following goals:

## 2021-05-14 NOTE — HEART FAILURE PROGRAM
Exacerbation of patient's known heart failure with reduced ejection fraction (HFrEF). Diagnosed last August and followed up with general cardiology thereafter.     Nursing: please complete the HF Discharge Checklist (pink sheet in hard chart) and have it cosigned by your charge RN before patient leaves the hospital.    Providers: below are all Guideline Directed Medical Therapy (GDMT) indicated for HFrEF. If any can not be prescribed by discharge, please note the clinical reason for each in your discharge summary.    Patient's EF is 35%. Should it return to below this, he needs to be considered for device therapy.    QUICK SUMMARY OF HFrEF MEASURES    -- Evidence Based Beta Blocker (bisoprolol, carvedilol, or toprol xl), for EF of 40% or less  -- NOAM - I, for EF of 40% or less  -- Aldosterone antagonist, for EF of 35% or less  -- Anticoagulation for atrial arrhythmia, if applicable  -- Glycemic control for DM + HF, if diabetic  -- Lipid lowering medication for DM + HF, if diabetic  -- Hydralazine dinitrate, if pt self identifies as   -- Pneumococcal vaccine if not previously received  -- Influenza vaccine if not previously received for the current flu season  -- Smoking cessation counseling documented if applicable  -- Device screening if applicable    Daily Nurse: please begin to fill out the HF checklist (pink sheet in hard chart) and use it to guide your daily care.    Discharge Nurse: please ensure completeness of the HF checklist (pink sheet in hard chart) and have it co-signed by the charge RN before the patient leaves the hospital.    Thank you, Bridgett, Cardio RN Navigator [phone]      DETAILED EXPLANATION OF HF MEASURES:  1. Documentation of LV systolic function (echo or cath) PTA, during this hospitalization, or plan to assess post discharge or reason for not assessing documented  2. Documentation of fluid intake and urine output every nursing shift  3. 2 hour post diuretic assessment  documented 2 hours after diuretic given  4. HF Patient Education using the Living Well With Heart Failure Booklet and Symptom Tracker documented every nursing shift  5. Nutrition consult for diet education  6. Daily weights (one weight documented every 24 hours) on a standing scale unless standing is contraindicated in which case bed scale can be used - have patient write weight on symptom tracker  7. For LVEF less than or equal to 40%, ACE-I, ARNI or ARB prescribed at discharge   8. For LVEF less than or equal to 40%, an Evidence Based Beta Blocker (bisoprolol, carvedilol, toprol xl) must be prescribed at discharge  9. For LVEF less than or equal to 35% aldosterone blockade prescribed at discharge  10. The combination of hydralazine and isosorbide dinitrate is recommended to reduce morbidity and mortality for patients self-described  Americans with NYHA class III-IV HFrEF (EF 40% or less), receiving optimal therapy with ACE inhibitors and beta blockers, unless contraindicated (Class I, CHOLO: A).  11. If a HF patient is diabetic or is newly diagnosed with DM: prescribed diabetes treatment at discharge in the form of glycemic control (diet or anti-hyperglycemic medication) or f/u appointment for diabetes management scheduled at discharge.  12. If a HF patient has diabetes: prescribed lipid lowering medication at discharge  13. Documented smoking cessation advice or counseling  14. If a HF patient has a-fib: anticoagulation is prescribed upon discharge or contraindication is documented  15. Screening for and administering immunizations as long as no contraindications: Pneumonia (regardless of age) and Influenza  16. Written discharge instructions include:  ? Daily weights  ? Record weight on tracker  ? Bring tracker to appointments  ? Call MD for weight gain of 3lb /day or 5lb/week  ? HF medication teaching  ? Low sodium diet  ? Follow up appointment within seven calendar days of d/c must include: date, time and  location  ? Activity  ? Worsening symptoms    What if any of the above HF measures are contraindicated?  ? Request that the discharging provider document the medication/intervention and the contraindication specifically in a progress note  ? For example: “no CHF meds due to hypotension” is not enough. It needs to say: “No ACE-I, ARNI, ARB due to hypotension”; “No Beta Blockade due to bradycardia”…

## 2021-05-14 NOTE — ASSESSMENT & PLAN NOTE
Status post high risk PCI 8/19/2020 successful PCI/CARLOS PDA, successful PTCA/PCI/CARLOS mid to distal LAD,    Continue home statin, ASA, Toporol XL  He was discharged with Brilinta 8/2020, however due to finance issue, he has been off it for a while. I discussed with cardiology Dr. Small, due to recent stenting and unable to afford Brilinta, ok to switch to plavix.   Start plavix

## 2021-05-14 NOTE — ASSESSMENT & PLAN NOTE
Likely secondary to fluid overload.    Start nitroglycerin sublingual tablets and drip for SBP goal less than 160. Discontinued on 5/14  Continue home losartan and spironolactone, toporol XL  Add hydralazine

## 2021-05-14 NOTE — CARE PLAN
The patient is Stable - Low risk of patient condition declining or worsening         Progress made toward(s) clinical / shift goals:      Problem: Knowledge Deficit - Standard  Goal: Patient and family/care givers will demonstrate understanding of plan of care, disease process/condition, diagnostic tests and medications  Outcome: Progressing  Note: Admitted patient. Educated the patient on the plan of care for the night. All questions answered at this time     Problem: Fall Risk  Goal: Patient will remain free from falls  5/14/2021 0112 by Glenis Bonner R.N.  Outcome: Progressing  5/14/2021 0111 by Glenis Bonner R.N.  Outcome: Progressing

## 2021-05-14 NOTE — ASSESSMENT & PLAN NOTE
TTE: EF 35%, slight imporved from EF 25% in 8/2020    Continue aggressive diuresis   Cont Toporol XL, losartan and aldactone.  Add hydralazine  Daily weights and strict ins and outs  Low salt diet

## 2021-05-14 NOTE — ED PROVIDER NOTES
ED Provider Note    CHIEF COMPLAINT  Chief Complaint   Patient presents with   • Shortness of Breath     x a few days. denies cough. denies pain.       HPI  Chino Deluca is a 66 y.o. male who presents to the emergency department with complaint of shortness of breath.  He states he has had increasing shortness of breath for the last 3 days.  Especially short of breath when he is ambulating.  The patient denies cough, fever, shakes, chills, sweats, nausea, vomiting.  He does have a history in the past of hypertension, heart valve disease.    REVIEW OF SYSTEMS  Positives as above. Pertinent negatives include fever, shakes, chills, sweats, nausea, vomiting, chest pain all other 10 review of systems are negative    PAST MEDICAL HISTORY  Past Medical History:   Diagnosis Date   • Cataract     OU-had surgery   • Diabetes (HCC)     Takes Insulin and oral medication   • Full dentures    • Glaucoma     OU Had Surgery   • Heart valve disease     Pts. friend states, Heart Valve Replacement At [facility] 2016    • High cholesterol    • Hypertension    • Pneumonia     H/O 2016   • Snoring        FAMILY HISTORY  Noncontributory    SOCIAL HISTORY  Social History     Socioeconomic History   • Marital status:      Spouse name: Not on file   • Number of children: Not on file   • Years of education: Not on file   • Highest education level: Not on file   Occupational History   • Not on file   Tobacco Use   • Smoking status: Never Smoker   • Smokeless tobacco: Never Used   Vaping Use   • Vaping Use: Never used   Substance and Sexual Activity   • Alcohol use: No   • Drug use: No   • Sexual activity: Yes     Partners: Female   Other Topics Concern   • Not on file   Social History Narrative   • Not on file     Social Determinants of Health     Financial Resource Strain:    • Difficulty of Paying Living Expenses:    Food Insecurity:    • Worried About Running Out of Food in the Last Year:    • Ran Out of Food in the Last Year:   "  Transportation Needs:    • Lack of Transportation (Medical):    • Lack of Transportation (Non-Medical):    Physical Activity:    • Days of Exercise per Week:    • Minutes of Exercise per Session:    Stress:    • Feeling of Stress :    Social Connections:    • Frequency of Communication with Friends and Family:    • Frequency of Social Gatherings with Friends and Family:    • Attends Mandaeism Services:    • Active Member of Clubs or Organizations:    • Attends Club or Organization Meetings:    • Marital Status:    Intimate Partner Violence:    • Fear of Current or Ex-Partner:    • Emotionally Abused:    • Physically Abused:    • Sexually Abused:        SURGICAL HISTORY  Past Surgical History:   Procedure Laterality Date   • VITRECTOMY POSTERIOR Left 6/9/2020    Procedure: VITRECTOMY, POSTERIOR PORTION- MAMBRANE PEEL POSS LASER GAS OR OIL;  Surgeon: Deonte Miguel M.D.;  Location: SURGERY SAME DAY [facility];  Service: Ophthalmology   • OTHER      cataract bilateral   • OTHER      Surgery For Glaucoma OU    • OTHER CARDIAC SURGERY      Per pts. friend Heart valve replacement 2016 [facility]       CURRENT MEDICATIONS  Home Medications     Reviewed by Paxton Carl R.N. (Registered Nurse) on 05/13/21 at 1614  Med List Status: Partial   Medication Last Dose Status   aspirin EC 81 MG EC tablet  Active   atorvastatin (LIPITOR) 80 MG tablet  Active   FARXIGA 5 MG Tab  Active   insulin 70/30 (HUMULIN/NOVOLIN) (70-30) 100 UNIT/ML Suspension  Active   losartan (COZAAR) 50 MG Tab  Active   metFORMIN (GLUCOPHAGE) 500 MG Tab  Active   metoprolol SR (TOPROL XL) 25 MG TABLET SR 24 HR  Active   spironolactone (ALDACTONE) 25 MG Tab  Active   ticagrelor (BRILINTA) 90 MG Tab tablet  Active                ALLERGIES  No Known Allergies    PHYSICAL EXAM  VITAL SIGNS: BP (!) 201/91   Pulse 75   Temp 36.7 °C (98.1 °F) (Temporal)   Resp (!) 21   Ht 1.702 m (5' 7\")   Wt 90.2 kg (198 lb 13.7 oz)   SpO2 91%   BMI 31.15 kg/m²  "     Constitutional: Well developed, Well nourished, No acute distress, Non-toxic appearance.   Eyes: PERRLA, EOMI, Conjunctiva normal, No discharge.   Cardiovascular: Normal heart rate, Normal rhythm, No murmurs, No rubs, No gallops, and intact distal pulses.   Thorax & Lungs:  No respiratory distress, slight rales and rhonchi bilaterally, No wheezing, No chest wall tenderness.   Abdomen: Bowel sounds normal, Soft, No tenderness, No guarding, No rebound, No pulsatile masses.   Skin: Warm, Dry, No erythema, No rash.   Extremities: Full range of motion, no deformity, no pedal edema.  Neurologic: Alert & oriented x 3, No focal deficits noted, acting appropriately on exam.  Psychiatric: Affect normal for clinical presentation.      RADIOLOGY/PROCEDURES  DX-CHEST-PORTABLE (1 VIEW)   Final Result      1.  Linear bibasilar opacities are likely due to atelectasis.   2.  There may be mild vascular congestion.        Results for orders placed or performed during the hospital encounter of 05/13/21   CBC with Differential   Result Value Ref Range    WBC 10.0 4.8 - 10.8 K/uL    RBC 4.64 (L) 4.70 - 6.10 M/uL    Hemoglobin 13.7 (L) 14.0 - 18.0 g/dL    Hematocrit 41.1 (L) 42.0 - 52.0 %    MCV 88.6 81.4 - 97.8 fL    MCH 29.5 27.0 - 33.0 pg    MCHC 33.3 (L) 33.7 - 35.3 g/dL    RDW 45.8 35.9 - 50.0 fL    Platelet Count 227 164 - 446 K/uL    MPV 10.6 9.0 - 12.9 fL    Neutrophils-Polys 60.00 44.00 - 72.00 %    Lymphocytes 20.80 (L) 22.00 - 41.00 %    Monocytes 11.20 0.00 - 13.40 %    Eosinophils 7.10 (H) 0.00 - 6.90 %    Basophils 0.70 0.00 - 1.80 %    Immature Granulocytes 0.20 0.00 - 0.90 %    Nucleated RBC 0.00 /100 WBC    Neutrophils (Absolute) 6.02 1.82 - 7.42 K/uL    Lymphs (Absolute) 2.08 1.00 - 4.80 K/uL    Monos (Absolute) 1.12 (H) 0.00 - 0.85 K/uL    Eos (Absolute) 0.71 (H) 0.00 - 0.51 K/uL    Baso (Absolute) 0.07 0.00 - 0.12 K/uL    Immature Granulocytes (abs) 0.02 0.00 - 0.11 K/uL    NRBC (Absolute) 0.00 K/uL   Comp  Metabolic Panel   Result Value Ref Range    Sodium 142 135 - 145 mmol/L    Potassium 3.9 3.6 - 5.5 mmol/L    Chloride 104 96 - 112 mmol/L    Co2 26 20 - 33 mmol/L    Anion Gap 12.0 7.0 - 16.0    Glucose 168 (H) 65 - 99 mg/dL    Bun 17 8 - 22 mg/dL    Creatinine 0.83 0.50 - 1.40 mg/dL    Calcium 8.8 8.5 - 10.5 mg/dL    AST(SGOT) 43 12 - 45 U/L    ALT(SGPT) 61 (H) 2 - 50 U/L    Alkaline Phosphatase 154 (H) 30 - 99 U/L    Total Bilirubin 0.8 0.1 - 1.5 mg/dL    Albumin 4.2 3.2 - 4.9 g/dL    Total Protein 7.0 6.0 - 8.2 g/dL    Globulin 2.8 1.9 - 3.5 g/dL    A-G Ratio 1.5 g/dL   TROPONIN   Result Value Ref Range    Troponin T 27 (H) 6 - 19 ng/L   LACTIC ACID   Result Value Ref Range    Lactic Acid 1.7 0.5 - 2.0 mmol/L   COV-2, FLU A/B, AND RSV BY PCR (2-4 HOURS CEPHEID): Collect NP swab in VTM    Specimen: Respirate   Result Value Ref Range    Influenza virus A RNA Negative Negative    Influenza virus B, PCR Negative Negative    RSV, PCR Negative Negative    SARS-CoV-2 by PCR NotDetected     SARS-CoV-2 Source NP Swab    proBrain Natriuretic Peptide, NT   Result Value Ref Range    NT-proBNP 1836 (H) 0 - 125 pg/mL   ESTIMATED GFR   Result Value Ref Range    GFR If African American >60 >60 mL/min/1.73 m 2    GFR If Non African American >60 >60 mL/min/1.73 m 2   EKG   Result Value Ref Range    Report       [facility] Emergency Dept.    Test Date:  2021  Pt Name:    PATRICIO HAMMONDS     Department: ER  MRN:        [id number]                      Room:  Gender:     Male                         Technician: [id number]  :        1955                   Requested By:ER TRIAGE PROTOCOL  Order #:    [id number]                    Luis MD: STEPHANIE OSBORN, DO    Measurements  Intervals                                Axis  Rate:       77                           P:          8  IL:         268                          QRS:        -50  QRSD:       118                          T:          129  QT:          440  QTc:        499    Interpretive Statements  SINUS RHYTHM  FIRST DEGREE AV BLOCK  LVH WITH IVCD, LAD AND SECONDARY REPOL ABNRM  Compared to ECG 2020 12:26:06  Intraventricular conduction delay now present  Left ventricular hypertrophy now present  Early repolarization now present  Sinus arrhythmia no longer present  Left anterior fascicular block no  longer present  Possible ischemia no longer present  Electronically Signed On 2021 17:15:10 PDT by STEPHANIE OSBORN DO     EKG   Result Value Ref Range    Report       [facility] Emergency Dept.    Test Date:  2021  Pt Name:    PATRICIO HAMMONDS     Department: ER  MRN:        [id number]                      Room:  Gender:     Male                         Technician: [id number]  :        1955                   Requested By:ER TRIAGE PROTOCOL  Order #:    [id number]                    Reading MD: STEPHANIE OSBORN DO    Measurements  Intervals                                Axis  Rate:       79                           P:          0  CO:         233                          QRS:        -50  QRSD:       114                          T:          134  QT:         420  QTc:        482    Interpretive Statements  SINUS RHYTHM  FIRST DEGREE AV BLOCK  LVH WITH IVCD, LAD AND SECONDARY REPOL ABNRM  Compared to ECG 2021 16:01:16  No significant changes  Electronically Signed On 2021 17:15:02 PDT by STEPHANIE OSBORN DO           COURSE & MEDICAL DECISION MAKING  Pertinent Labs & Imaging studies reviewed. (See chart for details)  This is a pleasant 66-year-old male presents with shortness of breath exertional dyspnea.  Here in the emergency department, the patient is hypoxic at 8889% on room air.  Covid test was negative, chest x-ray shows pulmonary edema no evidence of pulmonary filtrate, the patient has an elevated BNP of 1836 and a troponin of 27.  The patient does require nasal cannula oxygen  supplementation for his hypoxia.  Received Lasix 60 mg IV and will need evaluation for congestive heart failure.  The patient is slightly hypertensive here in the emergency department and have discussed this with Dr. Matthews  who will graciously hospitalize patient for further evaluation management for his presumed condition congestive heart failure.    FINAL IMPRESSION  Congestive heart failure           Electronically signed by: Shukri Dalton D.O., 5/13/2021 5:23 PM

## 2021-05-15 LAB
ANION GAP SERPL CALC-SCNC: 12 MMOL/L (ref 7–16)
BASOPHILS # BLD AUTO: 0.6 % (ref 0–1.8)
BASOPHILS # BLD: 0.07 K/UL (ref 0–0.12)
BUN SERPL-MCNC: 22 MG/DL (ref 8–22)
CALCIUM SERPL-MCNC: 9.4 MG/DL (ref 8.5–10.5)
CHLORIDE SERPL-SCNC: 96 MMOL/L (ref 96–112)
CO2 SERPL-SCNC: 28 MMOL/L (ref 20–33)
CREAT SERPL-MCNC: 0.75 MG/DL (ref 0.5–1.4)
EOSINOPHIL # BLD AUTO: 0.79 K/UL (ref 0–0.51)
EOSINOPHIL NFR BLD: 7.1 % (ref 0–6.9)
ERYTHROCYTE [DISTWIDTH] IN BLOOD BY AUTOMATED COUNT: 42.5 FL (ref 35.9–50)
GLUCOSE BLD-MCNC: 143 MG/DL (ref 65–99)
GLUCOSE BLD-MCNC: 260 MG/DL (ref 65–99)
GLUCOSE BLD-MCNC: 260 MG/DL (ref 65–99)
GLUCOSE BLD-MCNC: 283 MG/DL (ref 65–99)
GLUCOSE SERPL-MCNC: 126 MG/DL (ref 65–99)
HCT VFR BLD AUTO: 46.7 % (ref 42–52)
HGB BLD-MCNC: 16.1 G/DL (ref 14–18)
IMM GRANULOCYTES # BLD AUTO: 0.03 K/UL (ref 0–0.11)
IMM GRANULOCYTES NFR BLD AUTO: 0.3 % (ref 0–0.9)
LYMPHOCYTES # BLD AUTO: 2.31 K/UL (ref 1–4.8)
LYMPHOCYTES NFR BLD: 20.8 % (ref 22–41)
MCH RBC QN AUTO: 29.5 PG (ref 27–33)
MCHC RBC AUTO-ENTMCNC: 34.5 G/DL (ref 33.7–35.3)
MCV RBC AUTO: 85.7 FL (ref 81.4–97.8)
MONOCYTES # BLD AUTO: 1.2 K/UL (ref 0–0.85)
MONOCYTES NFR BLD AUTO: 10.8 % (ref 0–13.4)
NEUTROPHILS # BLD AUTO: 6.68 K/UL (ref 1.82–7.42)
NEUTROPHILS NFR BLD: 60.4 % (ref 44–72)
NRBC # BLD AUTO: 0 K/UL
NRBC BLD-RTO: 0 /100 WBC
PLATELET # BLD AUTO: 265 K/UL (ref 164–446)
PMV BLD AUTO: 10.5 FL (ref 9–12.9)
POTASSIUM SERPL-SCNC: 3.6 MMOL/L (ref 3.6–5.5)
RBC # BLD AUTO: 5.45 M/UL (ref 4.7–6.1)
SODIUM SERPL-SCNC: 136 MMOL/L (ref 135–145)
WBC # BLD AUTO: 11.1 K/UL (ref 4.8–10.8)

## 2021-05-15 PROCEDURE — 82962 GLUCOSE BLOOD TEST: CPT | Mod: 91

## 2021-05-15 PROCEDURE — 700102 HCHG RX REV CODE 250 W/ 637 OVERRIDE(OP): Performed by: STUDENT IN AN ORGANIZED HEALTH CARE EDUCATION/TRAINING PROGRAM

## 2021-05-15 PROCEDURE — 700111 HCHG RX REV CODE 636 W/ 250 OVERRIDE (IP): Performed by: STUDENT IN AN ORGANIZED HEALTH CARE EDUCATION/TRAINING PROGRAM

## 2021-05-15 PROCEDURE — 85025 COMPLETE CBC W/AUTO DIFF WBC: CPT

## 2021-05-15 PROCEDURE — 80048 BASIC METABOLIC PNL TOTAL CA: CPT

## 2021-05-15 PROCEDURE — 36415 COLL VENOUS BLD VENIPUNCTURE: CPT

## 2021-05-15 PROCEDURE — A9270 NON-COVERED ITEM OR SERVICE: HCPCS | Performed by: STUDENT IN AN ORGANIZED HEALTH CARE EDUCATION/TRAINING PROGRAM

## 2021-05-15 PROCEDURE — 99233 SBSQ HOSP IP/OBS HIGH 50: CPT | Performed by: STUDENT IN AN ORGANIZED HEALTH CARE EDUCATION/TRAINING PROGRAM

## 2021-05-15 PROCEDURE — 770020 HCHG ROOM/CARE - TELE (206)

## 2021-05-15 RX ORDER — CHOLECALCIFEROL (VITAMIN D3) 125 MCG
5 CAPSULE ORAL NIGHTLY
Status: DISCONTINUED | OUTPATIENT
Start: 2021-05-15 | End: 2021-05-16 | Stop reason: HOSPADM

## 2021-05-15 RX ORDER — HYDRALAZINE HYDROCHLORIDE 25 MG/1
25 TABLET, FILM COATED ORAL 2 TIMES DAILY
Status: DISCONTINUED | OUTPATIENT
Start: 2021-05-15 | End: 2021-05-16 | Stop reason: HOSPADM

## 2021-05-15 RX ADMIN — Medication 1000 UNITS: at 05:04

## 2021-05-15 RX ADMIN — SPIRONOLACTONE 25 MG: 25 TABLET ORAL at 05:04

## 2021-05-15 RX ADMIN — HYDRALAZINE HYDROCHLORIDE 25 MG: 25 TABLET, FILM COATED ORAL at 11:23

## 2021-05-15 RX ADMIN — ATORVASTATIN CALCIUM 40 MG: 40 TABLET, FILM COATED ORAL at 05:04

## 2021-05-15 RX ADMIN — ACETAMINOPHEN 650 MG: 325 TABLET, FILM COATED ORAL at 08:01

## 2021-05-15 RX ADMIN — LOSARTAN POTASSIUM 50 MG: 50 TABLET, FILM COATED ORAL at 05:04

## 2021-05-15 RX ADMIN — ENOXAPARIN SODIUM 40 MG: 40 INJECTION SUBCUTANEOUS at 05:03

## 2021-05-15 RX ADMIN — DOCUSATE SODIUM 50 MG AND SENNOSIDES 8.6 MG 2 TABLET: 8.6; 5 TABLET, FILM COATED ORAL at 05:04

## 2021-05-15 RX ADMIN — ASPIRIN 81 MG: 81 TABLET, COATED ORAL at 05:04

## 2021-05-15 RX ADMIN — FUROSEMIDE 40 MG: 10 INJECTION, SOLUTION INTRAMUSCULAR; INTRAVENOUS at 17:14

## 2021-05-15 RX ADMIN — CLOPIDOGREL BISULFATE 75 MG: 75 TABLET ORAL at 05:04

## 2021-05-15 RX ADMIN — FUROSEMIDE 40 MG: 10 INJECTION, SOLUTION INTRAMUSCULAR; INTRAVENOUS at 05:04

## 2021-05-15 RX ADMIN — METOPROLOL SUCCINATE 75 MG: 50 TABLET, EXTENDED RELEASE ORAL at 17:15

## 2021-05-15 RX ADMIN — INSULIN LISPRO 3 UNITS: 100 INJECTION, SOLUTION INTRAVENOUS; SUBCUTANEOUS at 21:15

## 2021-05-15 RX ADMIN — HYDRALAZINE HYDROCHLORIDE 25 MG: 25 TABLET, FILM COATED ORAL at 17:15

## 2021-05-15 RX ADMIN — ATORVASTATIN CALCIUM 40 MG: 40 TABLET, FILM COATED ORAL at 17:15

## 2021-05-15 RX ADMIN — Medication 5 MG: at 21:14

## 2021-05-15 RX ADMIN — INSULIN LISPRO 3 UNITS: 100 INJECTION, SOLUTION INTRAVENOUS; SUBCUTANEOUS at 17:17

## 2021-05-15 RX ADMIN — AMLODIPINE BESYLATE 5 MG: 5 TABLET ORAL at 05:04

## 2021-05-15 RX ADMIN — DOCUSATE SODIUM 50 MG AND SENNOSIDES 8.6 MG 2 TABLET: 8.6; 5 TABLET, FILM COATED ORAL at 17:14

## 2021-05-15 RX ADMIN — INSULIN LISPRO 3 UNITS: 100 INJECTION, SOLUTION INTRAVENOUS; SUBCUTANEOUS at 12:58

## 2021-05-15 RX ADMIN — INSULIN HUMAN 20 UNITS: 100 INJECTION, SUSPENSION SUBCUTANEOUS at 21:14

## 2021-05-15 ASSESSMENT — COPD QUESTIONNAIRES
DURING THE PAST 4 WEEKS HOW MUCH DID YOU FEEL SHORT OF BREATH: SOME OF THE TIME
HAVE YOU SMOKED AT LEAST 100 CIGARETTES IN YOUR ENTIRE LIFE: NO/DON'T KNOW
DO YOU EVER COUGH UP ANY MUCUS OR PHLEGM?: NO/ONLY WITH OCCASIONAL COLDS OR INFECTIONS
COPD SCREENING SCORE: 3

## 2021-05-15 ASSESSMENT — PAIN DESCRIPTION - PAIN TYPE
TYPE: ACUTE PAIN
TYPE: ACUTE PAIN

## 2021-05-15 ASSESSMENT — FIBROSIS 4 INDEX
FIB4 SCORE: 1.27
FIB4 SCORE: 1.27

## 2021-05-15 ASSESSMENT — ENCOUNTER SYMPTOMS
SHORTNESS OF BREATH: 1
PND: 0
ORTHOPNEA: 1

## 2021-05-15 NOTE — CARE PLAN
Problem: Knowledge Deficit - Standard  Goal: Patient and family/care givers will demonstrate understanding of plan of care, disease process/condition, diagnostic tests and medications  Note: Updated on plan of care. Understanding of plan of care addressed and gaps in understanding addressed.     Problem: Pain - Standard  Goal: Alleviation of pain or a reduction in pain to the patient’s comfort goal  Note: Pain medication provided as prescribed for pain. Pain goal established with patient and pain reassessed frequently.   The patient is Stable - Low risk of patient condition declining or worsening         Progress made toward(s) clinical / shift goals:  progressing as expected    Patient is not progressing towards the following goals:

## 2021-05-15 NOTE — PROGRESS NOTES
Received report from NOC RN. Assumed care of patient at 0700. Patient A&Ox 4, speaking in full sentences, follows commands and responds appropriately to questions. On 2L NC, no signs of respiratory distress. Respirations are even and unlabored. Patient states 9/10 pain at this time. POC discussed and agreed upon with patient. Call light and belongings within reach. Bed in lowest locked position. Upper side rails raised. Bed alarm on. Fall risk precautions in place. Hourly rounding. Will continue to monitor intake and output.

## 2021-05-15 NOTE — PROGRESS NOTES
Report received at bedside, pt care assumed, tele box on. Pt aaox4, no signs of distress noted at this time. Patient resting comfortably in bed. Pt is on 2L NC. POC discussed with pt and verbalizes no questions. Pt c/o of no pain. Pt denies any additional needs at this time. Bed in lowest position, pt educated on fall risk and verbalized understanding, call light within reach, will continue hourly rounding.

## 2021-05-15 NOTE — PROGRESS NOTES
[facility] Medicine Daily Progress Note    Date of Service  5/15/2021    Chief Complaint  66 y.o. male admitted 5/13/2021 with   Chief Complaint   Patient presents with   • Shortness of Breath     x a few days. denies cough. denies pain.       Hospital Course  66 y.o. male with a history of CAD s/p high risk PCI 8/19/2020 successful PCI/CARLOS PDA, successful PTCA/PCI/CARLOS mid to distal LAD, diabetes mellitus type 2, hypertension, mitral valve replacement in 2016, HFrEF 25%, who presented 5/13/2021 with worsening shortness of breath for 4 days, worsening laying down and exertions.       Of note, he has no insurance and reports he has not been taking Brilinta for a while due to cost issue. Discussed with cardiology, change to plavix     proBNP of 1836 with a troponin T of 27.  EKG showed no significant ischemic changes.  Chest x-ray showed interstitial infiltrates bilaterally consistent with pulmonary edema.  on IV lasix    DM2: A1c 10.     HTN: Dc Nitro gtt. Cont Toprol, losartan and aldactone and add hydralazine    Interval Problem Update  Patient was seen and examined at the bedside.  Reports sob improving, sleeps on 1 pillow. Sob occurs with exertions.  Leg swelling improving.  No chest pain, fever, chills, chest pain,nausea, vomiting, abdominal pain   services were used in the patient's primary language of Urdu.  Mode of interpretation: iPad  Content of Interpretation: History of present illness, review of systems, physical exam, explanation of care, and questions.    All Data, Medication data reviewed.  Case discussed with nursing, CM,  nurse, pharmacy in IDT rounds.     Consultants/Specialty  none    Code Status  Full Code    Disposition  TBD    Review of Systems  Review of Systems   Respiratory: Positive for shortness of breath.    Cardiovascular: Positive for orthopnea and leg swelling. Negative for chest pain and PND.   All other systems reviewed and are negative.       Physical Exam  Temp:   [36 °C (96.8 °F)-36.6 °C (97.8 °F)] 36.1 °C (96.9 °F)  Pulse:  [66-76] 68  Resp:  [18] 18  BP: (131-167)/(65-95) 149/86  SpO2:  [90 %-95 %] 93 %    Physical Exam  Vitals and nursing note reviewed.   Constitutional:       General: He is not in acute distress.     Appearance: Normal appearance. He is obese. He is ill-appearing.   HENT:      Head: Normocephalic and atraumatic.      Mouth/Throat:      Pharynx: Oropharynx is clear.   Eyes:      Pupils: Pupils are equal, round, and reactive to light.   Cardiovascular:      Rate and Rhythm: Normal rate and regular rhythm.   Pulmonary:      Effort: Pulmonary effort is normal.      Breath sounds: Rales present.   Abdominal:      General: Abdomen is flat. Bowel sounds are normal.      Palpations: Abdomen is soft.   Musculoskeletal:         General: Normal range of motion.   Skin:     General: Skin is warm.   Neurological:      General: No focal deficit present.      Mental Status: He is alert and oriented to person, place, and time.   Psychiatric:         Mood and Affect: Mood normal.         Behavior: Behavior normal.         Fluids    Intake/Output Summary (Last 24 hours) at 5/15/2021 1138  Last data filed at 5/15/2021 0747  Gross per 24 hour   Intake --   Output 2160 ml   Net -2160 ml       Laboratory  Recent Labs     05/13/21  1648 05/14/21  0551 05/15/21  0142   WBC 10.0 9.6 11.1*   RBC 4.64* 4.76 5.45   HEMOGLOBIN 13.7* 13.9* 16.1   HEMATOCRIT 41.1* 42.0 46.7   MCV 88.6 88.2 85.7   MCH 29.5 29.2 29.5   MCHC 33.3* 33.1* 34.5   RDW 45.8 44.5 42.5   PLATELETCT 227 214 265   MPV 10.6 11.2 10.5     Recent Labs     05/13/21  1648 05/14/21  0551 05/15/21  0142   SODIUM 142 139 136   POTASSIUM 3.9 3.5* 3.6   CHLORIDE 104 98 96   CO2 26 25 28   GLUCOSE 168* 202* 126*   BUN 17 16 22   CREATININE 0.83 0.65 0.75   CALCIUM 8.8 8.8 9.4                   Imaging  EC-ECHOCARDIOGRAM COMPLETE W/O CONT   Final Result      DX-CHEST-PORTABLE (1 VIEW)   Final Result      1.  Linear bibasilar  opacities are likely due to atelectasis.   2.  There may be mild vascular congestion.           Assessment/Plan  * Acute on chronic HFrEF (heart failure with reduced ejection fraction) (HCC)- (present on admission)  Assessment & Plan  TTE: EF 35%, slight imporved from EF 25% in 8/2020    Continue aggressive diuresis   Cont Toporol XL, losartan and aldactone.  Add hydralazine  Daily weights and strict ins and outs  Low salt diet    Acute hypoxemic respiratory failure (HCC)- (present on admission)  Assessment & Plan  SPO2 of 87% on room air requiring 2 LPM oxygen for SPO2 greater than 90%.  Secondary to acute exacerbation of HFrEF and fluid overload.    Aggressive diuresis   Oxygen as per respiratory protocol.  Nitroglycerin drip in setting of pulmonary edema, discontinued on 5/14  Wean O2 as tolerated  I&O    Status post insertion of drug eluting coronary artery stent- (present on admission)  Assessment & Plan  high risk PCI 8/19/2020 successful PCI/CARLOS PDA, successful PTCA/PCI/CARLOS mid to distal LAD      Hypertensive urgency- (present on admission)  Assessment & Plan  Likely secondary to fluid overload.    Start nitroglycerin sublingual tablets and drip for SBP goal less than 160. Discontinued on 5/14  Continue home losartan and spironolactone, toporol XL  Add hydralazine    Coronary artery disease involving native coronary artery of native heart without angina pectoris- (present on admission)  Assessment & Plan  Status post high risk PCI 8/19/2020 successful PCI/CARLOS PDA, successful PTCA/PCI/CARLOS mid to distal LAD,    Continue home statin, ASA, Toporol XL  He was discharged with Brilinta 8/2020, however due to finance issue, he has been off it for a while. I discussed with cardiology Dr. Small, due to recent stenting and unable to afford Brilinta, ok to switch to plavix.   Start plavix       Elevated troponin- (present on admission)  Assessment & Plan  Lower than previous levels. Likely demand ischemia, sec to  CHF exacerbation  Denies active chest pain    HLD (hyperlipidemia)- (present on admission)  Assessment & Plan  Continue home statin.    Type 2 diabetes mellitus with hyperglycemia, with long-term current use of insulin (HCC)- (present on admission)  Assessment & Plan  Poorly controlled.    A1c 10  Cont insulin 70/30 and SSI and hypoglycemic protocol  Accucheck       VTE prophylaxis: lovenox

## 2021-05-15 NOTE — CARE PLAN
The patient is Stable - Low risk of patient condition declining or worsening         Progress made toward(s) clinical / shift goals:   Problem: Care Map:  Day 1 Optimal Outcome for the Heart Failure Patient  Goal: Day 1:  Optimal Care of the heart failure patient  Outcome: Progressing     Problem: Knowledge Deficit - Standard  Goal: Patient and family/care givers will demonstrate understanding of plan of care, disease process/condition, diagnostic tests and medications  Outcome: Progressing     Problem: Fall Risk  Goal: Patient will remain free from falls  Outcome: Progressing

## 2021-05-16 VITALS
DIASTOLIC BLOOD PRESSURE: 79 MMHG | SYSTOLIC BLOOD PRESSURE: 142 MMHG | RESPIRATION RATE: 17 BRPM | HEART RATE: 76 BPM | TEMPERATURE: 97.4 F | HEIGHT: 66 IN | BODY MASS INDEX: 29.8 KG/M2 | WEIGHT: 185.41 LBS | OXYGEN SATURATION: 90 %

## 2021-05-16 PROBLEM — J96.01 ACUTE HYPOXEMIC RESPIRATORY FAILURE (HCC): Status: RESOLVED | Noted: 2021-05-14 | Resolved: 2021-05-16

## 2021-05-16 LAB
ANION GAP SERPL CALC-SCNC: 12 MMOL/L (ref 7–16)
BUN SERPL-MCNC: 34 MG/DL (ref 8–22)
CALCIUM SERPL-MCNC: 8.5 MG/DL (ref 8.5–10.5)
CHLORIDE SERPL-SCNC: 94 MMOL/L (ref 96–112)
CO2 SERPL-SCNC: 29 MMOL/L (ref 20–33)
CREAT SERPL-MCNC: 1.01 MG/DL (ref 0.5–1.4)
GLUCOSE BLD-MCNC: 177 MG/DL (ref 65–99)
GLUCOSE BLD-MCNC: 243 MG/DL (ref 65–99)
GLUCOSE SERPL-MCNC: 202 MG/DL (ref 65–99)
PHOSPHATE SERPL-MCNC: 4.8 MG/DL (ref 2.5–4.5)
POTASSIUM SERPL-SCNC: 3.7 MMOL/L (ref 3.6–5.5)
SODIUM SERPL-SCNC: 135 MMOL/L (ref 135–145)

## 2021-05-16 PROCEDURE — A9270 NON-COVERED ITEM OR SERVICE: HCPCS | Performed by: STUDENT IN AN ORGANIZED HEALTH CARE EDUCATION/TRAINING PROGRAM

## 2021-05-16 PROCEDURE — 700102 HCHG RX REV CODE 250 W/ 637 OVERRIDE(OP): Performed by: STUDENT IN AN ORGANIZED HEALTH CARE EDUCATION/TRAINING PROGRAM

## 2021-05-16 PROCEDURE — 700111 HCHG RX REV CODE 636 W/ 250 OVERRIDE (IP): Performed by: STUDENT IN AN ORGANIZED HEALTH CARE EDUCATION/TRAINING PROGRAM

## 2021-05-16 PROCEDURE — 99239 HOSP IP/OBS DSCHRG MGMT >30: CPT | Performed by: STUDENT IN AN ORGANIZED HEALTH CARE EDUCATION/TRAINING PROGRAM

## 2021-05-16 PROCEDURE — 84100 ASSAY OF PHOSPHORUS: CPT

## 2021-05-16 PROCEDURE — 36415 COLL VENOUS BLD VENIPUNCTURE: CPT

## 2021-05-16 PROCEDURE — 82962 GLUCOSE BLOOD TEST: CPT

## 2021-05-16 PROCEDURE — 80048 BASIC METABOLIC PNL TOTAL CA: CPT

## 2021-05-16 RX ORDER — SPIRONOLACTONE 25 MG/1
25 TABLET ORAL DAILY
Qty: 30 TABLET | Refills: 11 | Status: SHIPPED | OUTPATIENT
Start: 2021-05-16 | End: 2021-05-16 | Stop reason: SDUPTHER

## 2021-05-16 RX ORDER — HYDRALAZINE HYDROCHLORIDE 25 MG/1
25 TABLET, FILM COATED ORAL 2 TIMES DAILY
Qty: 60 TABLET | Refills: 0 | Status: SHIPPED | OUTPATIENT
Start: 2021-05-16 | End: 2021-05-19 | Stop reason: SDUPTHER

## 2021-05-16 RX ORDER — FUROSEMIDE 40 MG/1
40 TABLET ORAL DAILY
Qty: 30 TABLET | Refills: 0 | Status: SHIPPED | OUTPATIENT
Start: 2021-05-16 | End: 2021-05-16

## 2021-05-16 RX ORDER — POTASSIUM CHLORIDE 750 MG/1
10 TABLET, EXTENDED RELEASE ORAL DAILY
Qty: 30 EACH | Refills: 0 | Status: SHIPPED | OUTPATIENT
Start: 2021-05-16 | End: 2021-05-16

## 2021-05-16 RX ORDER — HYDRALAZINE HYDROCHLORIDE 25 MG/1
25 TABLET, FILM COATED ORAL 2 TIMES DAILY
Qty: 60 TABLET | Refills: 0 | Status: SHIPPED | OUTPATIENT
Start: 2021-05-16 | End: 2021-05-16

## 2021-05-16 RX ORDER — FUROSEMIDE 40 MG/1
40 TABLET ORAL
Status: DISCONTINUED | OUTPATIENT
Start: 2021-05-16 | End: 2021-05-16 | Stop reason: HOSPADM

## 2021-05-16 RX ORDER — CLOPIDOGREL BISULFATE 75 MG/1
75 TABLET ORAL DAILY
Qty: 30 TABLET | Refills: 0 | Status: SHIPPED | OUTPATIENT
Start: 2021-05-17 | End: 2021-05-16

## 2021-05-16 RX ORDER — CLOPIDOGREL BISULFATE 75 MG/1
75 TABLET ORAL DAILY
Qty: 30 TABLET | Refills: 0 | Status: SHIPPED | OUTPATIENT
Start: 2021-05-17 | End: 2021-05-19 | Stop reason: SDUPTHER

## 2021-05-16 RX ORDER — SPIRONOLACTONE 25 MG/1
25 TABLET ORAL DAILY
Qty: 30 TABLET | Refills: 11 | Status: SHIPPED | OUTPATIENT
Start: 2021-05-16 | End: 2022-08-08 | Stop reason: SDUPTHER

## 2021-05-16 RX ORDER — FUROSEMIDE 40 MG/1
40 TABLET ORAL DAILY
Qty: 30 TABLET | Refills: 0 | Status: SHIPPED | OUTPATIENT
Start: 2021-05-16 | End: 2021-05-19 | Stop reason: SDUPTHER

## 2021-05-16 RX ADMIN — ASPIRIN 81 MG: 81 TABLET, COATED ORAL at 05:41

## 2021-05-16 RX ADMIN — SPIRONOLACTONE 25 MG: 25 TABLET ORAL at 05:41

## 2021-05-16 RX ADMIN — DOCUSATE SODIUM 50 MG AND SENNOSIDES 8.6 MG 2 TABLET: 8.6; 5 TABLET, FILM COATED ORAL at 05:41

## 2021-05-16 RX ADMIN — ENOXAPARIN SODIUM 40 MG: 40 INJECTION SUBCUTANEOUS at 05:41

## 2021-05-16 RX ADMIN — HYDRALAZINE HYDROCHLORIDE 25 MG: 25 TABLET, FILM COATED ORAL at 05:41

## 2021-05-16 RX ADMIN — LOSARTAN POTASSIUM 50 MG: 50 TABLET, FILM COATED ORAL at 05:41

## 2021-05-16 RX ADMIN — INSULIN LISPRO 1 UNITS: 100 INJECTION, SOLUTION INTRAVENOUS; SUBCUTANEOUS at 08:01

## 2021-05-16 RX ADMIN — CLOPIDOGREL BISULFATE 75 MG: 75 TABLET ORAL at 05:41

## 2021-05-16 RX ADMIN — ATORVASTATIN CALCIUM 40 MG: 40 TABLET, FILM COATED ORAL at 05:41

## 2021-05-16 RX ADMIN — FUROSEMIDE 40 MG: 10 INJECTION, SOLUTION INTRAMUSCULAR; INTRAVENOUS at 05:41

## 2021-05-16 RX ADMIN — Medication 1000 UNITS: at 05:41

## 2021-05-16 RX ADMIN — FUROSEMIDE 40 MG: 40 TABLET ORAL at 09:35

## 2021-05-16 NOTE — CARE PLAN
Problem: Knowledge Deficit - Standard  Goal: Patient and family/care givers will demonstrate understanding of plan of care, disease process/condition, diagnostic tests and medications  Note: Updated on plan of care and needs for discharge. Understanding assessed and gaps addressed.     Problem: Fall Risk  Goal: Patient will remain free from falls  Note: Patient's Rachael Lagos fall risk assessment performed and interventions in place accordingly.   The patient is Stable - Low risk of patient condition declining or worsening         Progress made toward(s) clinical / shift goals:  progressing as expected    Patient is not progressing towards the following goals:

## 2021-05-16 NOTE — DISCHARGE INSTRUCTIONS
Discharge Instructions    Discharged to home by taxi with self. Discharged via walking, hospital escort: Yes.  Special equipment needed: Not Applicable    Be sure to schedule a follow-up appointment with your primary care doctor or any specialists as instructed.     Discharge Plan:   Diet Plan: Discussed  Activity Level: Discussed  Confirmed Follow up Appointment: Appointment Scheduled  Confirmed Symptoms Management: Discussed  Medication Reconciliation Updated: Yes    I understand that a diet low in cholesterol, fat, and sodium is recommended for good health. Unless I have been given specific instructions below for another diet, I accept this instruction as my diet prescription.   Other diet: 2 gram sodium diabetic    Special Instructions:   HF Patient Discharge Instructions  · Monitor your weight daily, and maintain a weight chart, to track your weight changes.   · Activity as tolerated, unless your Doctor has ordered otherwise. Other activity order: daily.  · Follow a low fat, low cholesterol, low salt diet unless instructed otherwise by your Doctor. Read the labels on the back of food products and track your intake of fat, cholesterol and salt.   · Fluid Restriction No. If a Fluid Restriction has been ordered by your Doctor, measure fluids with a measuring cup to ensure that you are not exceeding the restriction.   · No smoking.  · Oxygen No. If your Doctor has ordered that you wear Oxygen at home, it is important to wear it as ordered.  · Did you receive an explanation from staff on the importance of taking each of your medications and why it is necessary to keep taking them unless your doctor says to stop? Yes  · Were all of your questions answered about how to manage your heart failure and what to do if you have increased signs and symptoms after you go home? Yes  · Do you feel like your heart failure care team involved you in the care treatment plan and allowed you to make decisions regarding your care while  in the hospital and addressed any discharge needs you might have? Yes    See the educational handout provided at discharge for more information on monitoring your daily weight, activity and diet. This also explains more about Heart Failure, symptoms of a flare-up and some of the tests that you have undergone.     Warning Signs of a Flare-Up include:  · Swelling in the ankles or lower legs.  · Shortness of breath, while at rest, or while doing normal activities.   · Shortness of breath at night when in bed, or coughing in bed.   · Requiring more pillows to sleep at night, or needing to sit up at night to sleep.  · Feeling weak, dizzy or fatigued.     When to call your Doctor:  · Call [facility] seven days a week from 8:00 a.m. to 8:00 p.m. for medical questions [phone].  · Call your Primary Care Physician or Cardiologist if:   1. You experience any pain radiating to your jaw or neck.  2. You have any difficulty breathing.  3. You experience weight gain of 3 lbs in a day or 5 lbs in a week.   4. You feel any palpitations or irregular heartbeats.  5. You become dizzy or lose consciousness.   If you have had an angiogram or had a pacemaker or AICD placed, and experience:  1. Bleeding, drainage or swelling at the surgical / puncture site.  2. Fever greater than 100.0 F  3. Shock from internal defibrillator.  4. Cool and / or numb extremities.      · Is patient discharged on Warfarin / Coumadin?   No     Depression / Suicide Risk    As you are discharged from this [facility], it is important to learn how to keep safe from harming yourself.    Recognize the warning signs:  · Abrupt changes in personality, positive or negative- including increase in energy   · Giving away possessions  · Change in eating patterns- significant weight changes-  positive or negative  · Change in sleeping patterns- unable to sleep or sleeping all the time   · Unwillingness or inability to  communicate  · Depression  · Unusual sadness, discouragement and loneliness  · Talk of wanting to die  · Neglect of personal appearance   · Rebelliousness- reckless behavior  · Withdrawal from people/activities they love  · Confusion- inability to concentrate     If you or a loved one observes any of these behaviors or has concerns about self-harm, here's what you can do:  · Talk about it- your feelings and reasons for harming yourself  · Remove any means that you might use to hurt yourself (examples: pills, rope, extension cords, firearm)  · Get professional help from the community (Mental Health, Substance Abuse, psychological counseling)  · Do not be alone:Call your Safe Contact- someone whom you trust who will be there for you.  · Call your local CRISIS HOTLINE 337-1627 or [phone]  · Call your local Children's Mobile Crisis Response Team [place] (743) 484-7011 or www.[place]  · Call the toll free National Suicide Prevention Hotlines   · National Suicide Prevention Lifeline 878-733-SVKK (3633)  · [facility] Network 800-SUICIDE (752-0828)      Discharge Instructions per Cyndee Cutler M.D.    Please follow-up with PCP and cardiology as outpatient.  Please take medications as instructed. Please do not miss any medications  Your new medications are plavix, lasix and hydralazine. Please take them as prescribed.   Please check your weight every day. If you notice you gain 5lbs overnight, please call your primary doctor or heart doctor  Low salt diet less than 2gm daily  Limit your fluid intake to 2L per day  Check blood pressure and blood sugar daily and record them and bring the log for your primary care doctor visit.     DIET: cardiac and diabetic diet    ACTIVITY: As tolerated    DIAGNOSIS: CHFrEF exacerbation, DM2 poor controlled, HTN urgency, Hx of CAD    Return to ER in the event of new or worsening symptoms. Please note importance of compliance and the patient has agreed to proceed  with all medical recommendations and follow up plan indicated above. All medications come with benefits and risks. Risks may include permanent injury or death and these risks can be minimized with close reassessment and monitoring. Please make it to your scheduled follow ups with PCP and cardiology      Plan de acción para controlar la insuficiencia cardíaca  Heart Failure Action Plan  Un plan de acción para controlar la insuficiencia cardíaca lo ayuda a comprender qué hacer cuando tiene síntomas de esta afección. Siga el plan que usted y el médico elaboraron. Revise el plan con el médico en todas las consultas.  Mamta donovan  Estos signos y síntomas significan que debe obtener atención médica de inmediato:  · Tiene dificultad para respirar cuando descansa.  · Tiene tos seca, y esta empeora.  · Tiene dolor o hinchazón en las piernas o el abdomen, y estos empeoran.  · De repente, aumenta más de 2 a 3 lb (0,9 a 1,4 kg) por día o más de 5 lb (2,3 kg) en jason aspen semana. Esta cantidad podría ser mayor o angélica según casanova afección.  · Se siente confundido o tiene dificultad para mantenerse despierto.  · Siente dolor en el pecho.  · No tiene apetito.  · Se desmaya.  Si presenta alguno de estos síntomas:  · Llame al servicio de emergencias de casanova localidad (911 en los [place] de inmediato o busque ayuda en la marco antonio de urgencias del hospital más cercano.  Mamta amarilla  Estos signos y síntomas significan que casanova afección podría estar empeorando y que debe hacer algunos cambios:  · Tiene dificultad para respirar cuando está activo o necesita jason mayor cantidad de almohadas para dormir.  · Tiene hinchazón en las piernas o el abdomen.  · Aumenta de 2 a 3 lb (0,9 a 1,4 kg) por día o 5 lb (2,3 kg) en jason aspen semana. Esta cantidad podría ser mayor o angélica según casanova afección.  · Se cansa con facilidad.  · Tiene dificultad para dormir.  · Tiene tos seca.  Si presenta alguno de estos síntomas:  · Comuníquese con el médico el día  siguiente.  · El médico podría ajustarle los medicamentos.  Mamta thiago  Estos signos significan que se encuentra hebert y puede continuar del mismo modo:  · No tiene falta el aire.  · Tiene poca hinchazón y no empeora.  · Tiene un peso estable (ni aumenta ni desciende).  · Casanova nivel de actividad es normal.  · No tiene dolor de pecho ni otros síntomas nuevos.  Siga estas indicaciones en casanova casa:  · Frohna los medicamentos de venta zana y los recetados solamente doris se lo haya indicado el médico.  · Pésese todos los días. Casanova peso ideal es __________ lb (__________ kg).  ? Llame al médico si aumenta más de __________ lb (__________ kg) en un solo día o más de __________ lb (__________ kg) en jason aspen semana.  · Siga jason dieta cardiosaludable. Trabaje con un especialista en alimentación y nutrición (nutricionista) para elaborar un plan de alimentación idóneo para usted.  · Concurra a todas las visitas de seguimiento doris se lo haya indicado el médico. [place] es importante.  Dónde encontrar más información:  · Asociación Estad[place] del [place] (American Heart Association): www.heart.org  Resumen  · Siga el plan de acción que usted y el médico elaboraron.  · Obtenga ayuda de inmediato si observa alguno de los síntomas de la mamta donovan.  Esta información no tiene doris fin reemplazar el consejo del médico. Asegúrese de hacerle al médico cualquier pregunta que tenga.  Document Released: 08/13/2018 Document Revised: 11/26/2018 Document Reviewed: 08/13/2018  Elsevier Patient Education © 2020 Elsevier Inc.      Información básica sobre la diabetes  Diabetes Basics    La diabetes (diabetes mellitus) es jason enfermedad de larga duración (crónica). Se produce cuando el cuerpo no utiliza correctamente el azúcar (glucosa) que se libera de los alimentos después de comer.  La diabetes puede deberse a raffaele de estos problemas o a ambos:  · El páncreas no produce suficiente cantidad de jason hormona llamada insulina.  · El cuerpo no reacciona  de forma normal a la insulina que produce.  La insulina permite que ciertos azúcares (glucosa) ingresen a las células del cuerpo. [place] le proporciona energía. Si tiene diabetes, los azúcares no pueden ingresar a las células. [place] produce un aumento del nivel de azúcar en la ivon (hiperglucemia).  Sigue estas instrucciones en tu casa:  ¿Cómo se trata la diabetes?  Es posible que tenga que administrarse insulina u otros medicamentos para la diabetes todos los días para mantener el nivel de azúcar en la ivon equilibrado. Adminístrese los medicamentos para la diabetes todos los días doris se lo haya indicado el médico. Juliane jason lista de los medicamentos para la diabetes aquí:  Medicamentos para la diabetes  · Nombre del medicamento: ______________________________  ? Cantidad (dosis): ________________ Hora (a. m./p. m.): _______________ Notas: ___________________________________  · Nombre del medicamento: ______________________________  ? Cantidad (dosis): ________________ Hora (a. m./p. m.): _______________ Notas: ___________________________________  · Nombre del medicamento: ______________________________  ? Cantidad (dosis): ________________ Hora (a. m./p. m.): _______________ Notas: ___________________________________  Si usa insulina, aprenderá cómo aplicársela con inyecciones. Es posible que deba ajustar la cantidad en función de los alimentos que coma. Juliane jason lista de los tipos de insulina que usa aquí:  Insulina  · Tipo de insulina: ______________________________  ? Cantidad (dosis): ________________ Hora (a. m./p. m.): _______________ Notas: ___________________________________  · Tipo de insulina: ______________________________  ? Cantidad (dosis): ________________ Hora (a. m./p. m.): _______________ Notas: ___________________________________  · Tipo de insulina: ______________________________  ? Cantidad (dosis): ________________ Hora (a. m./p. m.): _______________ Notas:  ___________________________________  · Tipo de insulina: ______________________________  ? Cantidad (dosis): ________________ Hora (a. m./p. m.): _______________ Notas: ___________________________________  · Tipo de insulina: ______________________________  ? Cantidad (dosis): ________________ Hora (a. m./p. m.): _______________ Notas: ___________________________________  ¿Cómo me controlo el nivel de azúcar en la ivon?    Controle carmelita niveles de azúcar en la ivon con un medidor de glucemia según las indicaciones del médico.  El médico fijará los objetivos del tratamiento para usted. Generalmente, los resultados de los niveles de azúcar en la ivon deben ser los siguientes:  · Antes de las comidas (preprandial): de 80 a 130 mg/dl (de 4,4 a 7,2 mmol/l).  · Después de las comidas (posprandial): por debajo de 180 mg/dl (10 mmol/l).  · Nivel de A1c: menos del 7 %.  Anote las veces que se controlará los niveles de azúcar en la ivon:  Controles de azúcar en la ivon  · Hora: _______________ Notas: ___________________________________  · Hora: _______________ Notas: ___________________________________  · Hora: _______________ Notas: ___________________________________  · Hora: _______________ Notas: ___________________________________  · Hora: _______________ Notas: ___________________________________  · Hora: _______________ Notas: ___________________________________    ¿Qué maximus saber acerca del nivel bajo de azúcar en la ivon?  Un nivel bajo de azúcar en la ivon se denomina hipoglucemia. Joselyn cuadro ocurre cuando el nivel de azúcar en la ivon es igual o angélica que 70 mg/dl (3,9 mmol/l). Entre los síntomas, se pueden incluir los siguientes:  · Sentir:  ? Hambre.  ? Preocupación o nervios (ansiedad).  ? Sudoración y piel húmeda.  ? Confusión.  ? Mareos.  ? Somnolencia.  ? Ganas de vomitar (náuseas).  · Tener:  ? Latidos cardíacos acelerados.  ? Dolor de homero.  ? Cambios en la visión.  ? Hormigueo y falta de  sensibilidad (entumecimiento) alrededor de la boca, los labios o la lengua.  ? Movimientos espasmódicos que no puede controlar (convulsiones).  · Dificultades para hacer lo siguiente:  ? Moverse (coordinación).  ? Dormir.  ? Desmayos.  ? Molestarse con facilidad (irritabilidad).  Tratamiento del nivel bajo de azúcar en la ivon  Para tratar un nivel bajo de azúcar en la ivon, ingiera un alimento o jason bebida azucarada de inmediato. Si puede pensar con claridad y tragar de manera linder, siga la yeison 15/15, que consiste en lo siguiente:  · Consuma 15 gramos de un hidrato de carbono de acción rápida (carbohidrato). Hable con casanova médico acerca de cuánto debería consumir.  · Algunos hidratos de carbono de acción rápida son:  ? Comprimidos de azúcar (pastillas de glucosa). Consuma 3 o 4 pastillas de glucosa.  ? De 6 a 8 unidades de caramelos duros.  ? De 4 a 6 onzas (de 120 a 150 ml) de jugo de frutas.  ? De 4 a 6 onzas (de 120 a 150 ml) de refresco común (no dietético).  ? 1 cucharada (15 ml) de miel o azúcar.  · Contrólese el nivel de azúcar en la ivon 15 minutos después de ingerir el hidrato de carbono.  · Si el nivel de azúcar en la ivon todavía es igual o angélica que 70 mg/dl (3,9 mmol/l), ingiera nuevamente 15 gramos de un hidrato de carbono.  · Si el nivel de azúcar en la ivon no supera los 70 mg/dl (3,9 mmol/l) después de 3 intentos, solicite ayuda de inmediato.  · Ingiera jason comida o jason colación en el transcurso de 1 hora después de que el nivel de azúcar en la ivon se haya normalizado.  Tratamiento del nivel muy bajo de azúcar en la ivon  Si el nivel de azúcar en la ivon es igual o angélica que 54 mg/dl (3 mmol/l), significa que está muy bajo (hipoglucemia grave). [place] es jason emergencia. No espere a elio si los síntomas desaparecen. Solicite atención médica de inmediato. Comuníquese con el servicio de emergencias de casanova localidad (911 en los [place]). No conduzca por carmelita propios medios hasta el  hospital.  Preguntas para hacerle al médico  · ¿Es necesario que me reúna con un instructor en el cuidado de la diabetes?  · ¿Qué equipos necesitaré para cuidarme en casa?  · ¿Qué medicamentos para la diabetes necesito? ¿Cuándo maximus tomarlos?  · ¿Con qué frecuencia maximus controlar mi nivel de azúcar en la ivon?  · ¿A qué número puedo llamar si tengo preguntas?  · ¿Cuándo es mi próxima mike con el médico?  · ¿Dónde puedo encontrar un felicita de apoyo para las personas con diabetes?  Dónde buscar más información  · American Diabetes Association (Asociación Estadounidense de la Diabetes): www.diabetes.org  · American Association of Diabetes Educators (Asociación Estadounidense de Instructores para el Cuidado de la Diabetes): www.diabeteseducator.org/patient-resources  Comuníquese con un médico si:  · El nivel de azúcar en la ivon es igual o mayor que 240 mg/dl (13,3 mmol/dl) rodrigo 2 días seguidos.  · Ha estado enfermo o ha tenido fiebre rodrigo 2 días o más y no mejora.  · Tiene alguno de estos problemas rodrigo más de 6 horas:  ? No puede comer ni beber.  ? Siente malestar estomacal (náuseas).  ? Vomita.  ? Presenta heces líquidas (diarrea).  Solicite ayuda inmediatamente si:  · El nivel de azúcar en la ivon está por debajo de 54 mg/dl (3 mmol/l).  · Se siente confundida.  · Tiene dificultad para hacer lo siguiente:  ? Pensar con claridad.  ? La respiración.  Resumen  · La diabetes (diabetes mellitus) es jason enfermedad de larga duración (crónica). Se produce cuando el cuerpo no utiliza correctamente el azúcar (glucosa) que se libera de los alimentos después de la digestión.  · Aplíquese la insulina y tome los medicamentos para la diabetes doris se lo hayan indicado.  · Contrólese el nivel de azúcar en la ivon todos los días, con la frecuencia que le hayan indicado.  · Concurra a todas las visitas de seguimiento doris se lo haya indicado el médico. [place] es importante.  Esta información no tiene doris fin reemplazar  el consejo del médico. Asegúrese de hacerle al médico cualquier pregunta que tenga.  Document Released: 04/26/2019 Document Revised: 02/12/2020 Document Reviewed: 04/26/2019  Elsevier Patient Education © 2020 Elsevier Inc.      Hipertensión en los adultos  Hypertension, Adult  El término hipertensión es otra forma de denominar a la presión arterial elevada. La presión arterial elevada fuerza al corazón a trabajar más para bombear la ivon. [place] puede causar problemas con el paso del tiempo.  Jason lectura de presión arterial está compuesta por 2 números. Hay un número superior (sistólico) sobre un número inferior (diastólico). Lo ideal es tener la presión arterial por debajo de 120/80. Las elecciones saludables pueden ayudar a bajar la presión arterial, o oswaldo vez necesite medicamentos para bajarla.  ¿Cuáles son las causas?  Se desconoce la causa de esta afección. Algunas afecciones pueden estar relacionadas con la presión arterial donato.  ¿Qué incrementa el riesgo?  · Fumar.  · Tener diabetes mellitus tipo 2, colesterol alto, o ambos.  · No hacer la cantidad suficiente de actividad física o ejercicio.  · Tener sobrepeso.  · Consumir mucha grasa, azúcar, calorías o sal (sodio) en casanova dieta.  · Beber alcohol en exceso.  · Tener jason enfermedad renal a aishwarya plazo (crónica).  · Tener antecedentes familiares de presión arterial donato.  · Edad. Los riesgos aumentan con la edad.  · Hemal. El riesgo es mayor para las personas afroamericanas.  · Sexo. Antes de los 45 años, los hombres corren más riesgo que las mujeres. Después de los 65 años, las mujeres corren más riesgo que los hombres.  · Tener apnea obstructiva del sueño.  · Estrés.  ¿Cuáles son los signos o los síntomas?  · Es posible que la presión arterial donato puede no cause síntomas. La presión arterial muy donato (crisis hipertensiva) puede provocar:  ? Dolor de homero.  ? Sensaciones de preocupación o nerviosismo (ansiedad).  ? Falta de aire.  ? Hemorragia  nasal.  ? Sensación de malestar en el estómago (náuseas).  ? Vómitos.  ? Cambios en la forma de elio.  ? Dolor muy intenso en el pecho.  ? Convulsiones.  ¿Cómo se trata?  · Esta afección se trata haciendo cambios saludables en el estilo de flo, por ejemplo:  ? Consumir alimentos saludables.  ? Hacer más ejercicio.  ? Beber menos alcohol.  · El médico puede recetarle medicamentos si los cambios en el estilo de flo no son suficientes para lograr controlar la presión arterial y si:  ? El número de arriba está por encima de 130.  ? El número de abajo está por encima de 80.  · Casanova presión arterial personal ideal puede variar.  Siga estas instrucciones en casanova casa:  Comida y bebida    · Si se lo dicen, siga el plan de alimentación de DASH (Dietary Approaches to Stop Hypertension, Maneras de alimentarse para detener la hipertensión). Para seguir jayde plan:  ? Llene la mitad del plato de cada comida con frutas y verduras.  ? Llene un cuarto del plato de cada comida con cereales integrales. Los cereales integrales incluyen pasta integral, arroz integral y pan integral.  ? Coma y pennie productos lácteos con bajo contenido de grasa, doris leche descremada o yogur bajo en grasas.  ? Llene un cuarto del plato de cada comida con proteínas bajas en grasa (magras). Las proteínas bajas en grasa incluyen pescado, stef sin piel, huevos, frijoles y tofu.  ? Evite consumir carne grasa, carne curada y procesada, o stef con piel.  ? Evite consumir alimentos prehechos o procesados.  · Consuma menos de 1500 mg de sal por día.  · No pennie alcohol si:  ? El médico le indica que no lo cheyenne.  ? Está embarazada, puede estar embarazada o está tratando de quedar embarazada.  · Si rafat alcohol:  ? Limite la cantidad que rafat a lo siguiente:  § De 0 a 1 medida por día para las mujeres.  § De 0 a 2 medidas por día para los hombres.  ? Esté atento a la cantidad de alcohol que hay en las bebidas que leann. En los [place], jason medida equivale a jason  botella de cerveza de 12 oz (355 ml), un vaso de vino de 5 oz (148 ml) o un vaso de jason bebida alcohólica de donato graduación de 1½ oz (44 ml).  Estilo de flo    · Trabaje con casanova médico para mantenerse en un peso saludable o para perder peso. Pregúntele a casanova médico cuál es el peso recomendable para usted.  · Juliane al menos 30 minutos de ejercicio la mayoría de los días de la semana. Estos pueden incluir caminar, nadar o andar en bicicleta.  · Realice al menos 30 minutos de ejercicio que fortalezca carmelita músculos (ejercicios de resistencia) al menos 3 días a la semana. Estos pueden incluir levantar pesas o hacer [place].  · No consuma ningún producto que contenga nicotina o tabaco, doris cigarrillos, cigarrillos electrónicos y tabaco de mascar. Si necesita ayuda para dejar de fumar, consulte al médico.  · Controle casanova presión arterial en casanova casa oswaldo doris le indicó el médico.  · Concurra a todas las visitas de seguimiento doris se lo haya indicado el médico. [place] es importante.  Medicamentos  · Olton los medicamentos de venta zana y los recetados solamente doris se lo haya indicado el médico. Siga cuidadosamente las indicaciones.  · No omita las dosis de medicamentos para la presión arterial. Los medicamentos pierden eficacia si omite dosis. El hecho de omitir las dosis también aumenta el riesgo de otros problemas.  · Pregúntele a casanova médico a qué efectos secundarios o reacciones a los medicamentos debe prestar atención.  Comuníquese con un médico si:  · Piensa que tiene jason reacción a los medicamentos que está tomando.  · Tiene marques de homero frecuentes (recurrentes).  · Se siente mareado.  · Tiene hinchazón en los tobillos.  · Tiene problemas de visión.  Solicite ayuda inmediatamente si:  · Siente un dolor de homero muy intenso.  · Empieza a sentirse desorientado (confundido).  · Se siente débil o adormecido.  · Siente que va a desmayarse.  · Tiene un dolor muy intenso en las siguientes zonas:  ? Pecho.  ? Vientre  (abdomen).  · Vomita más de jason vez.  · Tiene dificultad para respirar.  Resumen  · El término hipertensión es otra forma de denominar a la presión arterial elevada.  · La presión arterial elevada fuerza al corazón a trabajar más para bombear la ivon.  · Para la mayoría de las personas, jason presión arterial normal es angélica que 120/80.  · Las decisiones saludables pueden ayudarle a disminuir casanova presión arterial. Si no puede bajar casanova presión arterial mediante decisiones saludables, es posible que deba horacio medicamentos.  Esta información no tiene doris fin reemplazar el consejo del médico. Asegúrese de hacerle al médico cualquier pregunta que tenga.  Document Released: 06/07/2011 Document Revised: 10/03/2019 Document Reviewed: 10/03/2019  Elsevier Patient Education © 2020 Elsevier Inc.

## 2021-05-16 NOTE — PROGRESS NOTES
Patient discharged home with friend. IV removed. Monitor room notified. Discharge education provided.

## 2021-05-16 NOTE — CARE PLAN
The patient is Stable - Low risk of patient condition declining or worsening       Problem: Knowledge Deficit - Standard  Goal: Patient and family/care givers will demonstrate understanding of plan of care, disease process/condition, diagnostic tests and medications  Outcome: Progressing   Plan of care discussed w/ pt. Encourage pt to voice feelings/concerns regarding treatment plan, diagnostic tests, procedures/results and medications. Answer accordingly.        Problem: Fall Risk  Goal: Patient will remain free from falls  Outcome: Progressing   Pt remains free from falls at this time. Safety precautions in place. Pt educated on calling for assistance when needed.

## 2021-05-16 NOTE — PROGRESS NOTES
Received report from NOC RN. Assumed care of patient at 0700. Patient A&Ox 4, speaking in full sentences, follows commands and responds appropriately to questions. On room air, no signs of respiratory distress. Respirations are even and unlabored. Patient states 4/10 pain at this time. POC discussed and agreed upon with patient. Call light and belongings within reach. Bed in lowest locked position. Upper side rails raised. Fall risk precautions in place. Hourly rounding. Will continue to monitor intake and output.

## 2021-05-16 NOTE — PROGRESS NOTES
Handoff report received. Assumed patient care. Patient resting in bed. Patient not in distress, AAOX4. Pt is on RA. Safety precautions in place. Call light and personal belongings within reach. Bed is locked and in lowest position. Educated to call for assistance if needed, pt verbalized understanding.

## 2021-05-16 NOTE — DISCHARGE SUMMARY
Discharge Summary    CHIEF COMPLAINT ON ADMISSION  Chief Complaint   Patient presents with   • Shortness of Breath     x a few days. denies cough. denies pain.       Reason for Admission  chest pain     Admission Date  5/13/2021    CODE STATUS  Full Code    HPI & HOSPITAL COURSE  66 y.o. male with a history of CAD s/p high risk PCI 8/19/2020 successful PCI/CARLOS PDA, successful PTCA/PCI/CARLOS mid to distal LAD, diabetes mellitus type 2, hypertension, mitral valve replacement in 2016, HFrEF 25%, who presented 5/13/2021 with worsening shortness of breath for 4 days, worsening laying down and exertions.        Of note, he has no insurance and reports he has not been taking Brilinta for a while due to cost issue. Discussed with cardiology, will change to plavix and patient is advised to continue taking plavix and follow up with cardiology outpatient. Plavix price check by CM.     proBNP of 1836 with a troponin T of 27.  EKG showed no significant ischemic changes.  Chest x-ray showed interstitial infiltrates bilaterally consistent with pulmonary edema, which is consistent with acute on chronic CHF exacerbation. Echo showed EF 35%, improved from EF 25% in 8/2020. He was given iv lasix, which has improved his respiratory status. He is continued with Toprol, losartan, Aldactone and hydralazine has been added. Iv lasix was changed to po lasix 40mg daily upon discharge.    DM2: A1c 10. He is on SSI and diabetes educations have been provided.      On the day of discharge, patient is off O2, dyspnea improving. He is educated about low salt diet, daily weight and importance to take medications daily. Therefore, he is discharged in fair and stable condition to home with close outpatient follow-up. He is advised to follow up with PCP in one week and cardiology follow up within 2 weeks.     The patient met 2-midnight criteria for an inpatient stay at the time of discharge.    Discharge Date  5/16/21    FOLLOW UP ITEMS POST  DISCHARGE  PCP  cardiology    DISCHARGE DIAGNOSES  Principal Problem:    Acute on chronic HFrEF (heart failure with reduced ejection fraction) (HCC) POA: Yes  Active Problems:    Type 2 diabetes mellitus with hyperglycemia, with long-term current use of insulin (HCC) POA: Yes    HLD (hyperlipidemia) POA: Yes    Elevated troponin POA: Yes    Coronary artery disease involving native coronary artery of native heart without angina pectoris POA: Yes      Overview: Cardiac catheterization 8/19/2020 with 2.25 x 24 Synergy CARLOS to PDA,       angioplasty and stenting of the m-d LAD with a 2 x 30 mm [place] CARLOS.            Ticagrelor until August 2021    Hypertensive urgency POA: Yes    Status post insertion of drug eluting coronary artery stent (Chronic) POA: Yes  Resolved Problems:    Acute hypoxemic respiratory failure (HCC) POA: Yes      FOLLOW UP  Future Appointments   Date Time Provider Department Center   5/19/2021  9:00 AM KIM Guzmán [facility] None     DianaJOHNNY Kuo  [address] [place] [id number]  [phone]    Go on 5/26/2021  Por favor, cheyenne el registro de entrada a las 8:45 a.m. para jason mike de seguimiento a las 9:00 a.m. Wilma. Please check in at 8:45am for a 9am follow up appointment. Thank You.    [address] [id number] [phone]  In 1 week        MEDICATIONS ON DISCHARGE     Medication List      START taking these medications      Instructions   clopidogrel 75 MG Tabs  Start taking on: May 17, 2021  Commonly known as: PLAVIX   Take 1 tablet by mouth every day for 30 days.  Dose: 75 mg     furosemide 40 MG Tabs  Commonly known as: LASIX   Take 1 tablet by mouth every day for 30 days.  Dose: 40 mg     hydrALAZINE 25 MG Tabs  Commonly known as: APRESOLINE   Take 1 tablet by mouth 2 times a day for 30 days.  Dose: 25 mg        CHANGE how you take these medications      Instructions   insulin 70/30 (70-30) 100 UNIT/ML Susp  What  changed:   · how much to take  · when to take this  Commonly known as: HumuLIN 70/30/NovoLIN 70/30   Inject 15 Units as instructed 2 Times a Day.  Dose: 15 Units     Lipitor 40 MG Tabs  What changed: Another medication with the same name was removed. Continue taking this medication, and follow the directions you see here.  Generic drug: atorvastatin   Take 40 mg by mouth 2 times a day.  Dose: 40 mg     spironolactone 25 MG Tabs  What changed: how much to take  Commonly known as: ALDACTONE   Take 1 tablet by mouth every day.  Dose: 25 mg        CONTINUE taking these medications      Instructions   aspirin 81 MG EC tablet   Take 1 Tab by mouth every day.  Dose: 81 mg     Farxiga 5 MG Tabs  Generic drug: Dapagliflozin Propanediol   TAKE 1 TABLET BY MOUTH ONCE DAILY IN THE MORNING FOR DIABETES     losartan 50 MG Tabs  Commonly known as: COZAAR   Take 1 Tab by mouth every evening.  Dose: 50 mg     metFORMIN 500 MG Tabs  Commonly known as: GLUCOPHAGE   Take 1,000 mg by mouth 2 times a day.  Dose: 1,000 mg     metoprolol SR 25 MG Tb24  Commonly known as: TOPROL XL   Take 3 Tabs by mouth every evening.  Dose: 75 mg     vitamin D 1000 Unit (25 mcg) Tabs  Commonly known as: cholecalciferol   Take 1,000 Units by mouth every day.  Dose: 1,000 Units        STOP taking these medications    ibuprofen 200 MG Tabs  Commonly known as: MOTRIN     ticagrelor 90 MG Tabs tablet  Commonly known as: BRILINTA            Allergies  No Known Allergies    DIET  Orders Placed This Encounter   Procedures   • Diet Order Diet: 2 Gram Sodium; Second Modifier: (optional): Consistent CHO (Diabetic)     Standing Status:   Standing     Number of Occurrences:   1     Order Specific Question:   Diet:     Answer:   2 Gram Sodium [7]     Order Specific Question:   Second Modifier: (optional)     Answer:   Consistent CHO (Diabetic) [4]       ACTIVITY  As tolerated.  Weight bearing as tolerated    CONSULTATIONS  none    PROCEDURES  none    LABORATORY  Lab  Results   Component Value Date    SODIUM 135 05/16/2021    POTASSIUM 3.7 05/16/2021    CHLORIDE 94 (L) 05/16/2021    CO2 29 05/16/2021    GLUCOSE 202 (H) 05/16/2021    BUN 34 (H) 05/16/2021    CREATININE 1.01 05/16/2021        Lab Results   Component Value Date    WBC 11.1 (H) 05/15/2021    HEMOGLOBIN 16.1 05/15/2021    HEMATOCRIT 46.7 05/15/2021    PLATELETCT 265 05/15/2021        Total time of the discharge process exceeds 45 minutes.

## 2021-05-18 LAB
BACTERIA BLD CULT: NORMAL
BACTERIA BLD CULT: NORMAL
SIGNIFICANT IND 70042: NORMAL
SIGNIFICANT IND 70042: NORMAL
SITE SITE: NORMAL
SITE SITE: NORMAL
SOURCE SOURCE: NORMAL
SOURCE SOURCE: NORMAL

## 2021-05-19 ENCOUNTER — PATIENT OUTREACH (OUTPATIENT)
Dept: HEALTH INFORMATION MANAGEMENT | Facility: OTHER | Age: 66
End: 2021-05-19

## 2021-05-19 ENCOUNTER — OFFICE VISIT (OUTPATIENT)
Dept: CARDIOLOGY | Facility: MEDICAL CENTER | Age: 66
End: 2021-05-19
Payer: MEDICARE

## 2021-05-19 VITALS
BODY MASS INDEX: 30.7 KG/M2 | SYSTOLIC BLOOD PRESSURE: 182 MMHG | OXYGEN SATURATION: 92 % | HEART RATE: 72 BPM | WEIGHT: 191 LBS | HEIGHT: 66 IN | DIASTOLIC BLOOD PRESSURE: 88 MMHG

## 2021-05-19 DIAGNOSIS — I10 ESSENTIAL HYPERTENSION: ICD-10-CM

## 2021-05-19 DIAGNOSIS — Z79.899 HIGH RISK MEDICATION USE: ICD-10-CM

## 2021-05-19 DIAGNOSIS — I50.20 ACC/AHA STAGE C SYSTOLIC HEART FAILURE (HCC): ICD-10-CM

## 2021-05-19 DIAGNOSIS — I50.9 HEART FAILURE, NYHA CLASS 2 (HCC): ICD-10-CM

## 2021-05-19 DIAGNOSIS — Z95.5 STATUS POST INSERTION OF DRUG ELUTING CORONARY ARTERY STENT: Chronic | ICD-10-CM

## 2021-05-19 DIAGNOSIS — Z79.4 TYPE 2 DIABETES MELLITUS WITH HYPERGLYCEMIA, WITH LONG-TERM CURRENT USE OF INSULIN (HCC): ICD-10-CM

## 2021-05-19 DIAGNOSIS — I25.10 CORONARY ARTERY DISEASE INVOLVING NATIVE CORONARY ARTERY OF NATIVE HEART WITHOUT ANGINA PECTORIS: ICD-10-CM

## 2021-05-19 DIAGNOSIS — Z95.2 S/P MVR (MITRAL VALVE REPLACEMENT): ICD-10-CM

## 2021-05-19 DIAGNOSIS — I25.5 ISCHEMIC CARDIOMYOPATHY: ICD-10-CM

## 2021-05-19 DIAGNOSIS — E78.2 MIXED HYPERLIPIDEMIA: ICD-10-CM

## 2021-05-19 DIAGNOSIS — E11.65 TYPE 2 DIABETES MELLITUS WITH HYPERGLYCEMIA, WITH LONG-TERM CURRENT USE OF INSULIN (HCC): ICD-10-CM

## 2021-05-19 PROCEDURE — 99214 OFFICE O/P EST MOD 30 MIN: CPT | Performed by: NURSE PRACTITIONER

## 2021-05-19 RX ORDER — CLOPIDOGREL BISULFATE 75 MG/1
75 TABLET ORAL DAILY
Qty: 30 TABLET | Refills: 11 | Status: SHIPPED | OUTPATIENT
Start: 2021-05-19 | End: 2022-04-01

## 2021-05-19 RX ORDER — LOSARTAN POTASSIUM 50 MG/1
50 TABLET ORAL EVERY EVENING
Qty: 30 TABLET | Refills: 11 | Status: SHIPPED | OUTPATIENT
Start: 2021-05-19 | End: 2022-04-01 | Stop reason: CLARIF

## 2021-05-19 RX ORDER — HYDRALAZINE HYDROCHLORIDE 25 MG/1
25 TABLET, FILM COATED ORAL 2 TIMES DAILY
Qty: 60 TABLET | Refills: 11 | Status: SHIPPED | OUTPATIENT
Start: 2021-05-19 | End: 2021-06-03

## 2021-05-19 RX ORDER — FUROSEMIDE 40 MG/1
40 TABLET ORAL DAILY
Qty: 30 TABLET | Refills: 11 | Status: SHIPPED | OUTPATIENT
Start: 2021-05-19 | End: 2022-04-01

## 2021-05-19 ASSESSMENT — ENCOUNTER SYMPTOMS
PALPITATIONS: 0
COUGH: 0
CLAUDICATION: 0
ABDOMINAL PAIN: 0
DIZZINESS: 1
PND: 0
ORTHOPNEA: 0
MYALGIAS: 0
SHORTNESS OF BREATH: 1
FEVER: 0

## 2021-05-19 ASSESSMENT — FIBROSIS 4 INDEX: FIB4 SCORE: 1.27

## 2021-05-19 NOTE — PROGRESS NOTES
Community Health Worker Intake    • Social determinates of health intake completed.   • Identified barriers to: transportation, financial/food insecurity.  • Contact information provided to Chino Anh Deluca   • Has PCP appointment scheduled for: 5/26 at 9:00 am  • Scheduled Food Delivery: 5/20 at 10:00 am  • Outpatient assessment completed.  • Did the patient receive medications post discharge: Yes    CHW Bean reached out to pt via TC to f/u after d/c and introduce CCM services. Pt said he was doing well and had just left his cardiology appt and was at [facility] to pick-up some new medications. He had picked up the previous four already that were listed on his AVS, no side-effects or questions so far. He is aware of his f/u with his PCP and will attend. For transportation to [facility], pt takes the bus. He accepted that CHW mail him a couple bus passes and was also interested in [facility] transportation services. He expressed financial insecurity due to missing work for a few weeks since he has been sick. CHW will complete food bag delivery tomorrow 5/20 and include rent assistance application, Good Rx card, and food bank flyer. He rents a room at [address] [place] [address] and address in chart is his sisters where he prefers to receive his personal mail at. He has a good support system consisting of his sister and does not need additional resources other than those mentioned.     Plan: Food bag delivery on 5/20 with resources included.

## 2021-05-19 NOTE — PROGRESS NOTES
Chief Complaint   Patient presents with   • Coronary Artery Disease     hospital follow up [facility] # [id number] Brissa       Subjective:   Chino Deluca is a 66 y.o. male who presents today for follow up after his recent hospitalization.    Using , Deric, [id number]    Patient was last seen in clinic at his initial visit with Mireya Bernabe on 9/4/2020.    Patient had a recent hospitalization on 5/13/2021 through 5/16/2021.  Patient presented with shortness of breath.  Patient had not been taking his Brilinta due to cost.  He was diuresed and his symptoms improved.  His medications were adjusted.    Patient comes in the clinic today reporting having some shortness of breath with exertion and occasional episodes of lower extremity edema and dizziness.    He denies chest pain, palpitations, orthopnea, PND.    Patient does bring in his medications today, but is not taking losartan or Farxiga.  He is unsure about the losartan, but states the Farxiga was very expensive.    He currently does not have any insurance and is trying to work on coverage.    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    Patient is not weighing himself at home, does not have a scale, plans on buying one today.    Additonally, patient has the following medical problems:    -Ischemic cardiomyopathy, LVEF 35%    -CAD, s/p high risk PCI/CARLOS to PDA, mid to distal LAD on 8/19/2020    -Diabetes: taking metformin    -HTN    -Mitral valve replacement in 2016  Past Medical History:   Diagnosis Date   • Cataract     OU-had surgery   • Diabetes (HCC)     Takes Insulin and oral medication   • Full dentures    • Glaucoma     OU Had Surgery   • Heart valve disease     Pts. friend states, Heart Valve Replacement At [facility] 2016    • HFrEF (heart failure with reduced ejection fraction) ([facility])    • High cholesterol    • Hypertension    • Pneumonia     H/O 2016   • Snoring      Past Surgical History:   Procedure Laterality  Date   • VITRECTOMY POSTERIOR Left 6/9/2020    Procedure: VITRECTOMY, POSTERIOR PORTION- MAMBRANE PEEL POSS LASER GAS OR OIL;  Surgeon: Deonte Miguel M.D.;  Location: SURGERY SAME DAY [facility];  Service: Ophthalmology   • OTHER      cataract bilateral   • OTHER      Surgery For Glaucoma OU    • OTHER CARDIAC SURGERY      Per pts. friend Heart valve replacement 2016 [facility]     Family History   Problem Relation Age of Onset   • No Known Problems Mother    • Diabetes Father      Social History     Socioeconomic History   • Marital status:      Spouse name: Not on file   • Number of children: Not on file   • Years of education: Not on file   • Highest education level: Not on file   Occupational History   • Not on file   Tobacco Use   • Smoking status: Never Smoker   • Smokeless tobacco: Never Used   Vaping Use   • Vaping Use: Never used   Substance and Sexual Activity   • Alcohol use: No   • Drug use: No   • Sexual activity: Yes     Partners: Female   Other Topics Concern   • Not on file   Social History Narrative   • Not on file     Social Determinants of Health     Financial Resource Strain:    • Difficulty of Paying Living Expenses:    Food Insecurity:    • Worried About Running Out of Food in the Last Year:    • Ran Out of Food in the Last Year:    Transportation Needs:    • Lack of Transportation (Medical):    • Lack of Transportation (Non-Medical):    Physical Activity:    • Days of Exercise per Week:    • Minutes of Exercise per Session:    Stress:    • Feeling of Stress :    Social Connections:    • Frequency of Communication with Friends and Family:    • Frequency of Social Gatherings with Friends and Family:    • Attends Nondenominational Services:    • Active Member of Clubs or Organizations:    • Attends Club or Organization Meetings:    • Marital Status:    Intimate Partner Violence:    • Fear of Current or Ex-Partner:    • Emotionally Abused:    • Physically Abused:    • Sexually Abused:      No  Known Allergies  Outpatient Encounter Medications as of 5/19/2021   Medication Sig Dispense Refill   • losartan (COZAAR) 50 MG Tab Take 1 tablet by mouth every evening. 30 tablet 11   • clopidogrel (PLAVIX) 75 MG Tab Take 1 tablet by mouth every day. 30 tablet 11   • furosemide (LASIX) 40 MG Tab Take 1 tablet by mouth every day. 30 tablet 11   • hydrALAZINE (APRESOLINE) 25 MG Tab Take 1 tablet by mouth 2 times a day. 60 tablet 11   • spironolactone (ALDACTONE) 25 MG Tab Take 1 tablet by mouth every day. 30 tablet 11   • atorvastatin (LIPITOR) 40 MG Tab Take 80 mg by mouth every day.     • vitamin D (CHOLECALCIFEROL) 1000 Unit (25 mcg) Tab Take 1,000 Units by mouth every day.     • aspirin EC 81 MG EC tablet Take 1 Tab by mouth every day. 30 Tab 11   • metoprolol SR (TOPROL XL) 25 MG TABLET SR 24 HR Take 3 Tabs by mouth every evening. 30 Tab 11   • metFORMIN (GLUCOPHAGE) 500 MG Tab Take 1,000 mg by mouth 2 times a day.     • insulin 70/30 (HUMULIN/NOVOLIN) (70-30) 100 UNIT/ML Suspension Inject 15 Units as instructed 2 Times a Day. (Patient taking differently: Inject 30 Units under the skin at bedtime.) 10 mL 0   • [DISCONTINUED] clopidogrel (PLAVIX) 75 MG Tab Take 1 tablet by mouth every day for 30 days. 30 tablet 0   • [DISCONTINUED] furosemide (LASIX) 40 MG Tab Take 1 tablet by mouth every day for 30 days. 30 tablet 0   • [DISCONTINUED] hydrALAZINE (APRESOLINE) 25 MG Tab Take 1 tablet by mouth 2 times a day for 30 days. 60 tablet 0   • [DISCONTINUED] FARXIGA 5 MG Tab TAKE 1 TABLET BY MOUTH ONCE DAILY IN THE MORNING FOR DIABETES (Patient not taking: Reported on 5/19/2021)     • [DISCONTINUED] losartan (COZAAR) 50 MG Tab Take 1 Tab by mouth every evening. (Patient not taking: Reported on 5/19/2021) 30 Tab 11     No facility-administered encounter medications on file as of 5/19/2021.     Review of Systems   Constitutional: Negative for fever and malaise/fatigue.   Respiratory: Positive for shortness of breath.  "Negative for cough.    Cardiovascular: Positive for leg swelling. Negative for chest pain, palpitations, orthopnea, claudication and PND.   Gastrointestinal: Negative for abdominal pain.   Musculoskeletal: Negative for myalgias.   Neurological: Positive for dizziness (occ).   All other systems reviewed and are negative.       Objective:   BP (!) 182/88 (BP Location: Right arm, Patient Position: Sitting)   Pulse 72   Ht 1.676 m (5' 6\")   Wt 86.6 kg (191 lb)   SpO2 92%   BMI 30.83 kg/m²     Physical Exam   Constitutional: He is oriented to person, place, and time. He appears well-developed.   HENT:   Head: Normocephalic and atraumatic.   Eyes: Pupils are equal, round, and reactive to light.   Neck: No JVD present.   Cardiovascular: Normal rate, regular rhythm and normal heart sounds.   Pulmonary/Chest: Effort normal and breath sounds normal. No respiratory distress. He has no wheezes. He has no rales.   Abdominal: Soft. Bowel sounds are normal.   Musculoskeletal:      Cervical back: Normal range of motion and neck supple.   Neurological: He is alert and oriented to person, place, and time.   Skin: Skin is warm and dry.   Psychiatric: His behavior is normal.   Vitals reviewed.    Lab Results   Component Value Date/Time    CHOLSTRLTOT 183 05/07/2020 03:09 AM    LDL see below 05/07/2020 03:09 AM    HDL 31 (A) 05/07/2020 03:09 AM    TRIGLYCERIDE 587 (H) 05/07/2020 03:09 AM       Lab Results   Component Value Date/Time    SODIUM 135 05/16/2021 03:28 AM    POTASSIUM 3.7 05/16/2021 03:28 AM    CHLORIDE 94 (L) 05/16/2021 03:28 AM    CO2 29 05/16/2021 03:28 AM    GLUCOSE 202 (H) 05/16/2021 03:28 AM    BUN 34 (H) 05/16/2021 03:28 AM    CREATININE 1.01 05/16/2021 03:28 AM     Lab Results   Component Value Date/Time    ALKPHOSPHAT 132 (H) 05/14/2021 05:51 AM    ASTSGOT 36 05/14/2021 05:51 AM    ALTSGPT 50 05/14/2021 05:51 AM    TBILIRUBIN 1.2 05/14/2021 05:51 AM      Transthoracic Echo Report 8/13/2020  No prior study is " available for comparison.   Contrast was used to enhance visualization of the endocardial border.  Moderate concentric left ventricular hypertrophy.  Left ventricular ejection fraction is visually estimated to be 25%.  Akinesis of the mid to distal inferior wall, distal anterior wall , and   mid to distal septum.  [place] is mildly dyskinetic without thrombus.  A reliable estimation of diastolic function cannot be made due to   mitral valve disease.  Known mitral valve replacement which is functioning normally with   appropriate transvalvular gradient.  Mean transvalvular gradient is 4  mmHg at a heart rate of 83 BPM.  Mild aortic insufficiency.  Aortic sclerosis without stenosis.  Unable to estimate pulmonary artery pressure due to an inadequate   tricuspid regurgitant jet.  A reliable estimation of diastolic function cannot be made due to   mitral valve disease.  Normal pericardium without effusion.  Results via [place] text to ordering physician at 08:42 hrs      Coronary angiogram 8/19/2020   Referring physician: Dr. Tamy Chairez   Pre-operative Diagnosis:  Two-vessel coronary disease (total occlusion of mid left anterior descending artery and 90% mid posterior descending artery stenosis) with new onset systolic heart failure and prior mitral valve replacement felt to be for poor surgical candidate for coronary bypass grafting     Post-operative Diagnosis:   1.  Status post stenting of posterior descending artery with 2.25 x 24 mm Synergy drug eluting stent with no significant residual stenosis and EL-3 flow  2.  Status post balloon angioplasty and stenting of mid to distal left anterior descending artery with 2 x 30 mm T[place] drug eluting stent with no significant restenosis in the stented segment but residual diffuse disease in the distal left anterior descending artery with EL-3 flow     Procedure:  1.  Percutaneous intervention of the posterior descending artery of the right coronary artery  2.  Percutaneous  coronary intervention of the left anterior descending artery  3.  Supervision of moderate conscious sedation       Transthoracic Echo Report 5/14/2021  Prior echo 8/13/20, the EF has mildly improved.  Left ventricular ejection fraction is visually estimated to be 35%.  Mild aortic stenosis.  Unable to estimate pulmonary artery pressure due to an inadequate   tricuspid regurgitant jet.    Assessment:     1. Coronary artery disease involving native coronary artery of native heart without angina pectoris  Basic Metabolic Panel   2. S/P MVR (mitral valve replacement)     3. Ischemic cardiomyopathy  Basic Metabolic Panel   4. High risk medication use  Basic Metabolic Panel   5. Essential hypertension     6. ACC/AHA stage C systolic heart failure (HCC)     7. Heart failure, NYHA class 2 (HCC)     8. Mixed hyperlipidemia     9. Type 2 diabetes mellitus with hyperglycemia, with long-term current use of insulin (HCC)     10. Status post insertion of drug eluting coronary artery stent         Medical Decision Making:  Today's Assessment / Status / Plan:   1. HFrEF, Stage C, Class 2-3, LVEF 35%: Based on physical examination findings, patient is euvolemic. No JVD, lungs are clear to auscultation, no pitting edema in bilateral lower extremities, no ascites.  -Heart failure due to ischemic cardiomyopathy  -ACE-I/ARB/ARNI: Restart losartan 50 mg daily  -Evidence Based Beta-blocker: Continue Toprol-XL 75 mg daily  -Aldosterone Antagonist: Continue spironolactone 12.5 mg daily  -Diuretic: Continue furosemide 40 mg daily  -Other Meds: Continue hydralazine 25 mg twice a day  -Patient is unable to afford Farxiga due to lack of insurance coverage, can consider once patient gets coverage  -Labs: BMP in 2 weeks (information given to patient regarding cash clinical laboratory)  -Repeat Echo in 3 months, if LVEF not >35%, then will discuss/consider ICD for primary Prevention  -Reinforced s/sx of worsening heart failure with patient and  weight monitoring. Pt verbalizes understanding. Pt to call office or RTC if present.  Patient to purchase a scale today  -PUMP line number [phone] (PUMP)  -Heart Failure Education: pt to be contacted by HF nurse for further education  -Advanced care planning: Advanced directive and POLST to be discussed at later date  -Encourage compliance     2.  CAD, s/p PCI/CARLOS to the PDA and LAD on 8/19/2020  -Continue aspirin 81 mg daily  -Continue clopidogrel 75 mg daily  -DAPT for 1 year  -Continue atorvastatin 80 mg daily    3.  Hypertension:  -BP is elevated today, patient to restart losartan per above and continue other recommendations    4.  Hyperlipidemia:  -Will follow lipid panel  -Continue atorvastatin 80 mg daily    5.  Mitral valve replacement in 2016:  -Valve functioning normally per recent echo    6.  Diabetes:  -Continue Metformin 1000 mg twice a day  -Continue insulin 70/30 30 units at bedtime  -Continue follow-up with PCP    Note given to patient to return back to work in 2 weeks per patient request.  Patient to notify office if he cannot return to work without restrictions.    Patient will continue to work on insurance coverage.    FU in clinic in 2 weeks with lab testing. Sooner if needed.    Patient verbalizes understanding and agrees with the plan of care.     PLEASE NOTE: This Note was created using voice recognition Software. I have made every reasonable attempt to correct obvious errors, but I expect that there are errors of grammar and possibly content that I did not discover before finalizing the note

## 2021-05-19 NOTE — LETTER
May 19, 2021    To Whom It May Concern:         This is confirmation that Chino Kamara Deluca attended his scheduled appointment with KIM Guzmán on 5/19/21.         Patient can return to work on June 2, 2021 with no restrictions.          If you have any questions please do not hesitate to call me at the phone number listed below.        Sincerely,          GLORY Guzmán.  [phone]

## 2021-05-20 ENCOUNTER — PATIENT OUTREACH (OUTPATIENT)
Dept: HEALTH INFORMATION MANAGEMENT | Facility: OTHER | Age: 66
End: 2021-05-20

## 2021-05-20 NOTE — PROGRESS NOTES
CHW Bean completed food bag delivery and dropped off resources as well. CHW also set-up transportation with [facility] for his appt on 6/3 at 10:30 am. Pt is in-service with [facility] for Tuesdays, Thursdays & Fridays. [facility] will pick him up from home between 9:30-10:00 am and pick him up from his appt at 11:15. He requested that CHW call him a couple days in advance to give him a reminder. No other needs at this time. CHW will d/c from list for now but will call him to give appt & ride reminder on 5/28.

## 2021-05-28 ENCOUNTER — TELEPHONE (OUTPATIENT)
Dept: CARDIOLOGY | Facility: MEDICAL CENTER | Age: 66
End: 2021-05-28

## 2021-05-28 NOTE — TELEPHONE ENCOUNTER
Spoke with patient who confirmed has had lab work done at [facility]  on 05/27/2021. Request sent to  [facility] location, phone number, and fax number is [phone] for recent lab results to be sent for upcoming appointment.

## 2021-06-02 ENCOUNTER — PATIENT OUTREACH (OUTPATIENT)
Dept: HEALTH INFORMATION MANAGEMENT | Facility: OTHER | Age: 66
End: 2021-06-02

## 2021-06-02 SDOH — ECONOMIC STABILITY: TRANSPORTATION INSECURITY
IN THE PAST 12 MONTHS, HAS LACK OF TRANSPORTATION KEPT YOU FROM MEETINGS, WORK, OR FROM GETTING THINGS NEEDED FOR DAILY LIVING?: YES

## 2021-06-02 SDOH — ECONOMIC STABILITY: FOOD INSECURITY: WITHIN THE PAST 12 MONTHS, THE FOOD YOU BOUGHT JUST DIDN'T LAST AND YOU DIDN'T HAVE MONEY TO GET MORE.: NEVER TRUE

## 2021-06-02 SDOH — ECONOMIC STABILITY: INCOME INSECURITY: HOW HARD IS IT FOR YOU TO PAY FOR THE VERY BASICS LIKE FOOD, HOUSING, MEDICAL CARE, AND HEATING?: SOMEWHAT HARD

## 2021-06-02 SDOH — ECONOMIC STABILITY: FOOD INSECURITY: WITHIN THE PAST 12 MONTHS, YOU WORRIED THAT YOUR FOOD WOULD RUN OUT BEFORE YOU GOT MONEY TO BUY MORE.: SOMETIMES TRUE

## 2021-06-02 SDOH — ECONOMIC STABILITY: TRANSPORTATION INSECURITY
IN THE PAST 12 MONTHS, HAS THE LACK OF TRANSPORTATION KEPT YOU FROM MEDICAL APPOINTMENTS OR FROM GETTING MEDICATIONS?: YES

## 2021-06-02 NOTE — PROGRESS NOTES
DENISE Del Angel reached out to pt via TC to remind him of cardiology appt tomorrow & his ride times with [facility] transportation. Pt confirmed he will make it, however, he will need to be dropped off at his work ([employer]) after the appt. CHW reached out to [facility] transportation and left VM to change drop-off location. No other needs at this time.    Update: Per [facility] guidelines, they are unable to take pts to their work. Pt has been notified and said he will take the bus to work.

## 2021-06-03 ENCOUNTER — OFFICE VISIT (OUTPATIENT)
Dept: CARDIOLOGY | Facility: MEDICAL CENTER | Age: 66
End: 2021-06-03
Payer: MEDICARE

## 2021-06-03 VITALS
BODY MASS INDEX: 31.34 KG/M2 | HEART RATE: 67 BPM | WEIGHT: 195 LBS | SYSTOLIC BLOOD PRESSURE: 124 MMHG | RESPIRATION RATE: 15 BRPM | HEIGHT: 66 IN | OXYGEN SATURATION: 93 % | DIASTOLIC BLOOD PRESSURE: 76 MMHG

## 2021-06-03 DIAGNOSIS — I50.20 ACC/AHA STAGE C SYSTOLIC HEART FAILURE (HCC): ICD-10-CM

## 2021-06-03 DIAGNOSIS — Z95.5 STATUS POST INSERTION OF DRUG ELUTING CORONARY ARTERY STENT: ICD-10-CM

## 2021-06-03 DIAGNOSIS — Z95.2 S/P MVR (MITRAL VALVE REPLACEMENT): ICD-10-CM

## 2021-06-03 DIAGNOSIS — Z79.899 HIGH RISK MEDICATION USE: ICD-10-CM

## 2021-06-03 DIAGNOSIS — I10 ESSENTIAL HYPERTENSION: ICD-10-CM

## 2021-06-03 DIAGNOSIS — Z79.4 TYPE 2 DIABETES MELLITUS WITH HYPERGLYCEMIA, WITH LONG-TERM CURRENT USE OF INSULIN (HCC): ICD-10-CM

## 2021-06-03 DIAGNOSIS — E78.2 MIXED HYPERLIPIDEMIA: ICD-10-CM

## 2021-06-03 DIAGNOSIS — I25.5 ISCHEMIC CARDIOMYOPATHY: ICD-10-CM

## 2021-06-03 DIAGNOSIS — E11.65 TYPE 2 DIABETES MELLITUS WITH HYPERGLYCEMIA, WITH LONG-TERM CURRENT USE OF INSULIN (HCC): ICD-10-CM

## 2021-06-03 DIAGNOSIS — I50.9 HEART FAILURE, NYHA CLASS 2 (HCC): ICD-10-CM

## 2021-06-03 DIAGNOSIS — I25.10 CORONARY ARTERY DISEASE INVOLVING NATIVE CORONARY ARTERY OF NATIVE HEART WITHOUT ANGINA PECTORIS: ICD-10-CM

## 2021-06-03 PROCEDURE — 99214 OFFICE O/P EST MOD 30 MIN: CPT | Performed by: NURSE PRACTITIONER

## 2021-06-03 ASSESSMENT — ENCOUNTER SYMPTOMS
COUGH: 0
PND: 1
ABDOMINAL PAIN: 0
SHORTNESS OF BREATH: 1
DIZZINESS: 1
FEVER: 0
MYALGIAS: 0
PALPITATIONS: 0
CLAUDICATION: 0
ORTHOPNEA: 0

## 2021-06-03 ASSESSMENT — FIBROSIS 4 INDEX: FIB4 SCORE: 1.27

## 2021-06-03 NOTE — PROGRESS NOTES
Chief Complaint   Patient presents with   • Coronary Artery Disease     F/V Dx:  Coronary artery disease involving native coronary artery of native heart without angina pectoris       Subjective:   Chino Deluca is a 66 y.o. male who presents today for follow up after his recent hospitalization.    Using , Solo, [id number].    Patient was last seen in clinic on 5/19/2021. During that visit, he was recommended to restart losartan 50 mg daily.     He reports having some difficulty with dizziness if standing too quickly or changing positions. He continues to have some CRAIN and occ PND.    He denies chest pain, palpitations, orthopnea or lower extremity edema.    He reports his home weights have been fluctuating between 161-169 pounds and he does not know why it is fluctuating.    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  He reports doing well.    He continues to work on insurance coverage for his card.     Patient did not bring in his medications with him today, but states he should be taking everything as he previously had been.    Additonally, patient has the following medical problems:    -Hospitalization on 5/13/2021 through 5/16/2021.  Patient presented with shortness of breath.  Patient had not been taking his Brilinta due to cost.  He was diuresed and his symptoms improved.  His medications were adjusted.    -Ischemic cardiomyopathy, LVEF 35%    -CAD, s/p high risk PCI/CARLOS to PDA, mid to distal LAD on 8/19/2020    -Diabetes: taking metformin    -HTN    -Mitral valve replacement in 2016  Past Medical History:   Diagnosis Date   • Cataract     OU-had surgery   • Diabetes ([facility])     Takes Insulin and oral medication   • Full dentures    • Glaucoma     OU Had Surgery   • Heart valve disease     Pts. friend states, Heart Valve Replacement At [facility] 2016    • HFrEF (heart failure with reduced ejection fraction) ([facility])    • High cholesterol    • Hypertension    •  Pneumonia     H/O 2016   • Snoring      Past Surgical History:   Procedure Laterality Date   • VITRECTOMY POSTERIOR Left 6/9/2020    Procedure: VITRECTOMY, POSTERIOR PORTION- MAMBRANE PEEL POSS LASER GAS OR OIL;  Surgeon: Deonte Miguel M.D.;  Location: SURGERY SAME DAY [facility];  Service: Ophthalmology   • OTHER      cataract bilateral   • OTHER      Surgery For Glaucoma OU    • OTHER CARDIAC SURGERY      Per pts. friend Heart valve replacement 2016 [facility]     Family History   Problem Relation Age of Onset   • No Known Problems Mother    • Diabetes Father      Social History     Socioeconomic History   • Marital status:      Spouse name: Not on file   • Number of children: Not on file   • Years of education: Not on file   • Highest education level: Not on file   Occupational History   • Not on file   Tobacco Use   • Smoking status: Never Smoker   • Smokeless tobacco: Never Used   Vaping Use   • Vaping Use: Never used   Substance and Sexual Activity   • Alcohol use: No   • Drug use: No   • Sexual activity: Yes     Partners: Female   Other Topics Concern   • Not on file   Social History Narrative   • Not on file     Social Determinants of Health     Financial Resource Strain: Medium Risk   • Difficulty of Paying Living Expenses: Somewhat hard   Food Insecurity: Food Insecurity Present   • Worried About Running Out of Food in the Last Year: Sometimes true   • Ran Out of Food in the Last Year: Never true   Transportation Needs: Unmet Transportation Needs   • Lack of Transportation (Medical): Yes   • Lack of Transportation (Non-Medical): Yes   Physical Activity:    • Days of Exercise per Week:    • Minutes of Exercise per Session:    Stress:    • Feeling of Stress :    Social Connections:    • Frequency of Communication with Friends and Family:    • Frequency of Social Gatherings with Friends and Family:    • Attends Tenri[employer] Services:    • Active Member of Clubs or Organizations:    • Attends Club or  Organization Meetings:    • Marital Status:    Intimate Partner Violence:    • Fear of Current or Ex-Partner:    • Emotionally Abused:    • Physically Abused:    • Sexually Abused:      No Known Allergies  Outpatient Encounter Medications as of 6/3/2021   Medication Sig Dispense Refill   • losartan (COZAAR) 50 MG Tab Take 1 tablet by mouth every evening. 30 tablet 11   • clopidogrel (PLAVIX) 75 MG Tab Take 1 tablet by mouth every day. 30 tablet 11   • furosemide (LASIX) 40 MG Tab Take 1 tablet by mouth every day. 30 tablet 11   • [DISCONTINUED] hydrALAZINE (APRESOLINE) 25 MG Tab Take 1 tablet by mouth 2 times a day. 60 tablet 11   • spironolactone (ALDACTONE) 25 MG Tab Take 1 tablet by mouth every day. 30 tablet 11   • atorvastatin (LIPITOR) 40 MG Tab Take 80 mg by mouth every day.     • vitamin D (CHOLECALCIFEROL) 1000 Unit (25 mcg) Tab Take 1,000 Units by mouth every day.     • aspirin EC 81 MG EC tablet Take 1 Tab by mouth every day. 30 Tab 11   • metoprolol SR (TOPROL XL) 25 MG TABLET SR 24 HR Take 3 Tabs by mouth every evening. 30 Tab 11   • metFORMIN (GLUCOPHAGE) 500 MG Tab Take 1,000 mg by mouth 2 times a day.     • insulin 70/30 (HUMULIN/NOVOLIN) (70-30) 100 UNIT/ML Suspension Inject 15 Units as instructed 2 Times a Day. (Patient taking differently: Inject 30 Units under the skin at bedtime.) 10 mL 0     No facility-administered encounter medications on file as of 6/3/2021.     Review of Systems   Constitutional: Negative for fever and malaise/fatigue.   Respiratory: Positive for shortness of breath (occ with exertion ). Negative for cough.    Cardiovascular: Positive for PND (occ). Negative for chest pain, palpitations, orthopnea, claudication and leg swelling.   Gastrointestinal: Negative for abdominal pain.   Musculoskeletal: Negative for myalgias.   Neurological: Positive for dizziness (with position changes ).   All other systems reviewed and are negative.       Objective:   /76 (BP Location: Left  "arm, Patient Position: Sitting, BP Cuff Size: Adult)   Pulse 67   Resp 15   Ht 1.676 m (5' 6\")   Wt 88.5 kg (195 lb)   SpO2 93%   BMI 31.47 kg/m²     Physical Exam   Constitutional: He is oriented to person, place, and time. He appears well-developed.   HENT:   Head: Normocephalic and atraumatic.   Eyes: Pupils are equal, round, and reactive to light.   Neck: No JVD present.   Cardiovascular: Normal rate, regular rhythm and normal heart sounds.   Pulmonary/Chest: Effort normal and breath sounds normal. No respiratory distress. He has no wheezes. He has no rales.   Abdominal: Soft. Bowel sounds are normal.   Musculoskeletal:      Cervical back: Normal range of motion and neck supple.   Neurological: He is alert and oriented to person, place, and time.   Skin: Skin is warm and dry.   Psychiatric: His behavior is normal.   Vitals reviewed.    Lab Results   Component Value Date/Time    CHOLSTRLTOT 183 05/07/2020 03:09 AM    LDL see below 05/07/2020 03:09 AM    HDL 31 (A) 05/07/2020 03:09 AM    TRIGLYCERIDE 587 (H) 05/07/2020 03:09 AM       Lab Results   Component Value Date/Time    SODIUM 135 05/16/2021 03:28 AM    POTASSIUM 3.7 05/16/2021 03:28 AM    CHLORIDE 94 (L) 05/16/2021 03:28 AM    CO2 29 05/16/2021 03:28 AM    GLUCOSE 202 (H) 05/16/2021 03:28 AM    BUN 34 (H) 05/16/2021 03:28 AM    CREATININE 1.01 05/16/2021 03:28 AM     Lab Results   Component Value Date/Time    ALKPHOSPHAT 132 (H) 05/14/2021 05:51 AM    ASTSGOT 36 05/14/2021 05:51 AM    ALTSGPT 50 05/14/2021 05:51 AM    TBILIRUBIN 1.2 05/14/2021 05:51 AM      Transthoracic Echo Report 8/13/2020  No prior study is available for comparison.   Contrast was used to enhance visualization of the endocardial border.  Moderate concentric left ventricular hypertrophy.  Left ventricular ejection fraction is visually estimated to be 25%.  Akinesis of the mid to distal inferior wall, distal anterior wall , and   mid to distal septum.  Souris is mildly dyskinetic " without thrombus.  A reliable estimation of diastolic function cannot be made due to   mitral valve disease.  Known mitral valve replacement which is functioning normally with   appropriate transvalvular gradient.  Mean transvalvular gradient is 4  mmHg at a heart rate of 83 BPM.  Mild aortic insufficiency.  Aortic sclerosis without stenosis.  Unable to estimate pulmonary artery pressure due to an inadequate   tricuspid regurgitant jet.  A reliable estimation of diastolic function cannot be made due to   mitral valve disease.  Normal pericardium without effusion.  Results via [place] text to ordering physician at 08:42 hrs      Coronary angiogram 8/19/2020   Referring physician: Dr. Tamy Chairez   Pre-operative Diagnosis:  Two-vessel coronary disease (total occlusion of mid left anterior descending artery and 90% mid posterior descending artery stenosis) with new onset systolic heart failure and prior mitral valve replacement felt to be for poor surgical candidate for coronary bypass grafting     Post-operative Diagnosis:   1.  Status post stenting of posterior descending artery with 2.25 x 24 mm Synergy drug eluting stent with no significant residual stenosis and EL-3 flow  2.  Status post balloon angioplasty and stenting of mid to distal left anterior descending artery with 2 x 30 mm [place]ren drug eluting stent with no significant restenosis in the stented segment but residual diffuse disease in the distal left anterior descending artery with EL-3 flow     Procedure:  1.  Percutaneous intervention of the posterior descending artery of the right coronary artery  2.  Percutaneous coronary intervention of the left anterior descending artery  3.  Supervision of moderate conscious sedation       Transthoracic Echo Report 5/14/2021  Prior echo 8/13/20, the EF has mildly improved.  Left ventricular ejection fraction is visually estimated to be 35%.  Mild aortic stenosis.  Hello unable to estimate pulmonary artery  pressure due to an inadequate   tricuspid regurgitant jet.    Assessment:     1. ACC/AHA stage C systolic heart failure (HCC)     2. Heart failure, NYHA class 2 (HCC)     3. Coronary artery disease involving native coronary artery of native heart without angina pectoris     4. Status post insertion of drug eluting coronary artery stent     5. Ischemic cardiomyopathy     6. High risk medication use     7. Mixed hyperlipidemia     8. S/P MVR (mitral valve replacement)     9. Essential hypertension     10. Type 2 diabetes mellitus with hyperglycemia, with long-term current use of insulin ([facility])         Medical Decision Making:  Today's Assessment / Status / Plan:   1. HFrEF, Stage C, Class 2-3, LVEF 35%: Based on physical examination findings, patient is euvolemic. No JVD, lungs are clear to auscultation, no pitting edema in bilateral lower extremities, no ascites.  -Heart failure due to ischemic cardiomyopathy  -Patient to verify his medications and his list.  Encourage patient to bring his medications with every visit  -ACE-I/ARB/ARNI: Continue losartan 50 mg daily  -Evidence Based Beta-blocker: Continue Toprol-XL 75 mg daily  -Aldosterone Antagonist: Continue spironolactone 25 mg daily  -Diuretic: Continue furosemide 40 mg daily  -Other Meds: Recommend patient to stop hydralazine  -Patient is unable to afford Farxiga due to lack of insurance coverage, can consider once patient gets coverage  -Labs: No labs due at this time  -Repeat Echo in 2-3 months, if LVEF not >35%, then will discuss/consider ICD for primary Prevention  -Reinforced s/sx of worsening heart failure with patient and weight monitoring. Pt verbalizes understanding. Pt to call office or RTC if present.  Patient to purchase a scale today  -PUMP line number [phone] (PUMP)  -Heart Failure Education: Education reinforced, patient to be contacted by her heart failure nurse for further education  -Advanced care planning: Advanced directive and POLST to be  discussed at later date  -Encourage compliance     2.  CAD, s/p PCI/CARLOS to the PDA and LAD on 8/19/2020  -Continue aspirin 81 mg daily  -Continue clopidogrel 75 mg daily  -DAPT for 1 year  -Continue atorvastatin 80 mg daily    3.  Hypertension:  -BP better   -continue current recommendations    4.  Hyperlipidemia:  -Will follow lipid panel  -Continue atorvastatin 80 mg daily  -slightly elevate Alk phos at 144 on 5/20/2021    5.  Mitral valve replacement in 2016:  -Valve functioning normally per recent echo    6.  Diabetes:  -Continue Metformin 1000 mg twice a day  -Continue insulin 70/30 30 units at bedtime  -Continue follow-up with PCP    Patient will continue to work on insurance coverage.    FU in clinic in 4 weeks with medications.  Sooner if needed.    Patient verbalizes understanding and agrees with the plan of care.     PLEASE NOTE: This Note was created using voice recognition Software. I have made every reasonable attempt to correct obvious errors, but I expect that there are errors of grammar and possibly content that I did not discover before finalizing the note

## 2021-06-04 ENCOUNTER — TELEPHONE (OUTPATIENT)
Dept: CARDIOLOGY | Facility: MEDICAL CENTER | Age: 66
End: 2021-06-04

## 2021-06-04 NOTE — TELEPHONE ENCOUNTER
Called patient for HF education using interpretor Champ:[id number]  Reviewed patient diagnosis, patient knows very little about his heart condition, he knows that he had surgery but doesn't know exactly what is going on. Reviewed dietary restrictions with patient, patient does add salt to his food but he says it is very little. Patient eats a lot of beans and cooks his foods fresh at home, patient says he has a hard time reading, it sounded like a eye-sight problem but may potentially be a learning deficit as well. Requested that he bring in ALL of his medications to his next appointment for review. Patient verbalized understanding and was able to repeat back when he needed to call, he is weighing himself daily and will work on lowering sodium intake. Patient given PUMP line number with instructions to ask for interpretor when he calls.

## 2022-04-01 ENCOUNTER — OFFICE VISIT (OUTPATIENT)
Dept: CARDIOLOGY | Facility: MEDICAL CENTER | Age: 67
End: 2022-04-01
Payer: COMMERCIAL

## 2022-04-01 VITALS
HEIGHT: 66 IN | RESPIRATION RATE: 18 BRPM | WEIGHT: 193.8 LBS | HEART RATE: 65 BPM | DIASTOLIC BLOOD PRESSURE: 72 MMHG | OXYGEN SATURATION: 95 % | SYSTOLIC BLOOD PRESSURE: 180 MMHG | BODY MASS INDEX: 31.15 KG/M2

## 2022-04-01 DIAGNOSIS — Z95.5 STATUS POST INSERTION OF DRUG ELUTING CORONARY ARTERY STENT: ICD-10-CM

## 2022-04-01 DIAGNOSIS — E78.2 MIXED HYPERLIPIDEMIA: ICD-10-CM

## 2022-04-01 DIAGNOSIS — Z79.899 HIGH RISK MEDICATION USE: ICD-10-CM

## 2022-04-01 DIAGNOSIS — I50.9 HEART FAILURE, NYHA CLASS 2 (HCC): ICD-10-CM

## 2022-04-01 DIAGNOSIS — I10 HTN (HYPERTENSION), MALIGNANT: ICD-10-CM

## 2022-04-01 DIAGNOSIS — I50.20 ACC/AHA STAGE C SYSTOLIC HEART FAILURE (HCC): ICD-10-CM

## 2022-04-01 DIAGNOSIS — E11.65 TYPE 2 DIABETES MELLITUS WITH HYPERGLYCEMIA, WITH LONG-TERM CURRENT USE OF INSULIN (HCC): ICD-10-CM

## 2022-04-01 DIAGNOSIS — Z79.4 TYPE 2 DIABETES MELLITUS WITH HYPERGLYCEMIA, WITH LONG-TERM CURRENT USE OF INSULIN (HCC): ICD-10-CM

## 2022-04-01 DIAGNOSIS — I10 ESSENTIAL HYPERTENSION: ICD-10-CM

## 2022-04-01 DIAGNOSIS — Z95.2 S/P MVR (MITRAL VALVE REPLACEMENT): ICD-10-CM

## 2022-04-01 DIAGNOSIS — I25.5 ISCHEMIC CARDIOMYOPATHY: ICD-10-CM

## 2022-04-01 DIAGNOSIS — I25.10 CORONARY ARTERY DISEASE INVOLVING NATIVE CORONARY ARTERY OF NATIVE HEART WITHOUT ANGINA PECTORIS: ICD-10-CM

## 2022-04-01 PROCEDURE — 99214 OFFICE O/P EST MOD 30 MIN: CPT | Performed by: NURSE PRACTITIONER

## 2022-04-01 RX ORDER — OLMESARTAN MEDOXOMIL 20 MG/1
20 TABLET ORAL DAILY
Qty: 30 TABLET | Refills: 11 | Status: SHIPPED | OUTPATIENT
Start: 2022-04-01 | End: 2022-08-08 | Stop reason: SDUPTHER

## 2022-04-01 RX ORDER — OLMESARTAN MEDOXOMIL 20 MG/1
20 TABLET ORAL DAILY
COMMUNITY
Start: 2022-01-06 | End: 2022-04-01 | Stop reason: SDUPTHER

## 2022-04-01 ASSESSMENT — ENCOUNTER SYMPTOMS
BLURRED VISION: 1
COUGH: 0
SHORTNESS OF BREATH: 1
ORTHOPNEA: 0
MYALGIAS: 0
CLAUDICATION: 0
FEVER: 0
PND: 0
ABDOMINAL PAIN: 0
PALPITATIONS: 0
DIZZINESS: 1

## 2022-04-01 ASSESSMENT — FIBROSIS 4 INDEX: FIB4 SCORE: 1.29

## 2022-04-01 NOTE — PROGRESS NOTES
Chief Complaint   Patient presents with   • Coronary Artery Disease     F/V Dx: Coronary artery disease involving native coronary artery of native heart without angina pectoris   • Hypertension     F/V Dx: Hypertensive urgency     • Hyperlipidemia       Subjective:   Chino Deluca is a 67 y.o. male who presents today for follow up on his heart Failure.    Using , Delmy [id number].    Patient was last seen in clinic on 6/3/2021. During that visit, he was recommended to stop hydralazine.  Patient was recommended to follow-up in 4 weeks.    Patient reports he attempted to follow-up at 1 point, but states there was no  to assist him.  So he left.  Patient has been seeing his PCP.  They referred him back to our office for management.    Patient did bring in his medication bottles with him today.  He reports taking 125 mg of metoprolol instead of 75.    He reports having some blurred vision, occasional shortness of breath and dizziness.  He denies chest pain, palpitations, orthopnea, PND or edema.    He has an eye appointment on 4/8/2022.    Patient does have uncontrolled diabetes and has not yet changed his diet.  He does have a referral to endocrinology.  His last A1c was greater than 14 on 3/3/2022    He does not weigh himself at home.    Additonally, patient has the following medical problems:    -Hospitalization on 5/13/2021 through 5/16/2021.  Patient presented with shortness of breath.  Patient had not been taking his Brilinta due to cost.  He was diuresed and his symptoms improved.  His medications were adjusted.    -Ischemic cardiomyopathy, LVEF 35%    -CAD, s/p high risk PCI/CRALOS to PDA, mid to distal LAD on 8/19/2020    -Diabetes: taking metformin    -HTN    -Mitral valve replacement in 2016  Past Medical History:   Diagnosis Date   • Cataract     OU-had surgery   • Diabetes (HCC)     Takes Insulin and oral medication   • Full dentures    • Glaucoma     OU Had Surgery   •  Heart valve disease     Pts. friend states, Heart Valve Replacement At [facility] 2016    • HFrEF (heart failure with reduced ejection fraction) ([facility])    • High cholesterol    • Hypertension    • Pneumonia     H/O 2016   • Snoring      Past Surgical History:   Procedure Laterality Date   • VITRECTOMY POSTERIOR Left 6/9/2020    Procedure: VITRECTOMY, POSTERIOR PORTION- MAMBRANE PEEL POSS LASER GAS OR OIL;  Surgeon: Deonte Miguel M.D.;  Location: SURGERY SAME DAY [facility];  Service: Ophthalmology   • OTHER      cataract bilateral   • OTHER      Surgery For Glaucoma OU    • OTHER CARDIAC SURGERY      Per pts. friend Heart valve replacement 2016 [facility]     Family History   Problem Relation Age of Onset   • No Known Problems Mother    • Diabetes Father      Social History     Socioeconomic History   • Marital status:      Spouse name: Not on file   • Number of children: Not on file   • Years of education: Not on file   • Highest education level: Not on file   Occupational History   • Not on file   Tobacco Use   • Smoking status: Never Smoker   • Smokeless tobacco: Never Used   Vaping Use   • Vaping Use: Never used   Substance and Sexual Activity   • Alcohol use: No   • Drug use: No   • Sexual activity: Yes     Partners: Female   Other Topics Concern   • Not on file   Social History Narrative   • Not on file     Social Determinants of Health     Financial Resource Strain: Medium Risk   • Difficulty of Paying Living Expenses: Somewhat hard   Food Insecurity: Food Insecurity Present   • Worried About Running Out of Food in the Last Year: Sometimes true   • Ran Out of Food in the Last Year: Never true   Transportation Needs: Unmet Transportation Needs   • Lack of Transportation (Medical): Yes   • Lack of Transportation (Non-Medical): Yes   Physical Activity: Not on file   Stress: Not on file   Social Connections: Not on file   Intimate Partner Violence: Not on file   Housing Stability: Not on file     No Known  Allergies  Outpatient Encounter Medications as of 4/1/2022   Medication Sig Dispense Refill   • olmesartan (BENICAR) 20 MG Tab Take 1 Tablet by mouth every day. 30 Tablet 11   • spironolactone (ALDACTONE) 25 MG Tab Take 1 tablet by mouth every day. 30 tablet 11   • atorvastatin (LIPITOR) 40 MG Tab Take 80 mg by mouth every day.     • vitamin D (CHOLECALCIFEROL) 1000 Unit (25 mcg) Tab Take 1,000 Units by mouth every day.     • aspirin EC 81 MG EC tablet Take 1 Tab by mouth every day. 30 Tab 11   • metoprolol SR (TOPROL XL) 25 MG TABLET SR 24 HR Take 3 Tabs by mouth every evening. 30 Tab 11   • metFORMIN (GLUCOPHAGE) 500 MG Tab Take 1,000 mg by mouth 2 times a day.     • insulin 70/30 (HUMULIN/NOVOLIN) (70-30) 100 UNIT/ML Suspension Inject 15 Units as instructed 2 Times a Day. (Patient taking differently: Inject 30 Units under the skin at bedtime.) 10 mL 0   • [DISCONTINUED] mupirocin (BACTROBAN) 2 % Ointment Apply  topically. (Patient not taking: Reported on 4/1/2022)     • [DISCONTINUED] olmesartan (BENICAR) 20 MG Tab Take 20 mg by mouth every day.     • [DISCONTINUED] losartan (COZAAR) 50 MG Tab Take 1 tablet by mouth every evening. 30 tablet 11   • [DISCONTINUED] clopidogrel (PLAVIX) 75 MG Tab Take 1 tablet by mouth every day. 30 tablet 11   • [DISCONTINUED] furosemide (LASIX) 40 MG Tab Take 1 tablet by mouth every day. 30 tablet 11     No facility-administered encounter medications on file as of 4/1/2022.     Review of Systems   Constitutional: Negative for fever and malaise/fatigue.   Eyes: Positive for blurred vision.   Respiratory: Positive for shortness of breath (occ with exertion ). Negative for cough.    Cardiovascular: Negative for chest pain, palpitations, orthopnea, claudication, leg swelling and PND.   Gastrointestinal: Negative for abdominal pain.   Musculoskeletal: Negative for myalgias.   Neurological: Positive for dizziness (with position changes ).   All other systems reviewed and are  "negative.       Objective:   BP (!) 180/72 (BP Location: Left arm, Patient Position: Sitting, BP Cuff Size: Adult)   Pulse 65   Resp 18   Ht 1.676 m (5' 6\")   Wt 87.9 kg (193 lb 12.8 oz)   SpO2 95%   BMI 31.28 kg/m²     Physical Exam  Vitals reviewed.   Constitutional:       Appearance: He is well-developed.   HENT:      Head: Normocephalic and atraumatic.   Eyes:      Pupils: Pupils are equal, round, and reactive to light.   Neck:      Vascular: No JVD.   Cardiovascular:      Rate and Rhythm: Normal rate and regular rhythm.      Heart sounds: Normal heart sounds.   Pulmonary:      Effort: Pulmonary effort is normal. No respiratory distress.      Breath sounds: Normal breath sounds. No wheezing or rales.   Abdominal:      General: Bowel sounds are normal.      Palpations: Abdomen is soft.   Musculoskeletal:      Cervical back: Normal range of motion and neck supple.   Skin:     General: Skin is warm and dry.   Neurological:      Mental Status: He is alert and oriented to person, place, and time.   Psychiatric:         Behavior: Behavior normal.       Lab Results   Component Value Date/Time    CHOLSTRLTOT 183 05/07/2020 03:09 AM    LDL see below 05/07/2020 03:09 AM    HDL 31 (A) 05/07/2020 03:09 AM    TRIGLYCERIDE 587 (H) 05/07/2020 03:09 AM       Lab Results   Component Value Date/Time    SODIUM 135 05/16/2021 03:28 AM    POTASSIUM 3.7 05/16/2021 03:28 AM    CHLORIDE 94 (L) 05/16/2021 03:28 AM    CO2 29 05/16/2021 03:28 AM    GLUCOSE 202 (H) 05/16/2021 03:28 AM    BUN 34 (H) 05/16/2021 03:28 AM    CREATININE 1.01 05/16/2021 03:28 AM     Lab Results   Component Value Date/Time    ALKPHOSPHAT 132 (H) 05/14/2021 05:51 AM    ASTSGOT 36 05/14/2021 05:51 AM    ALTSGPT 50 05/14/2021 05:51 AM    TBILIRUBIN 1.2 05/14/2021 05:51 AM      Transthoracic Echo Report 8/13/2020  No prior study is available for comparison.   Contrast was used to enhance visualization of the endocardial border.  Moderate concentric left " ventricular hypertrophy.  Left ventricular ejection fraction is visually estimated to be 25%.  Akinesis of the mid to distal inferior wall, distal anterior wall , and   mid to distal septum.  [place] is mildly dyskinetic without thrombus.  A reliable estimation of diastolic function cannot be made due to   mitral valve disease.  Known mitral valve replacement which is functioning normally with   appropriate transvalvular gradient.  Mean transvalvular gradient is 4  mmHg at a heart rate of 83 BPM.  Mild aortic insufficiency.  Aortic sclerosis without stenosis.  Unable to estimate pulmonary artery pressure due to an inadequate   tricuspid regurgitant jet.  A reliable estimation of diastolic function cannot be made due to   mitral valve disease.  Normal pericardium without effusion.  Results via [place] text to ordering physician at 08:42 hrs      Coronary angiogram 8/19/2020   Referring physician: Dr. Tamy Chairez   Pre-operative Diagnosis:  Two-vessel coronary disease (total occlusion of mid left anterior descending artery and 90% mid posterior descending artery stenosis) with new onset systolic heart failure and prior mitral valve replacement felt to be for poor surgical candidate for coronary bypass grafting     Post-operative Diagnosis:   1.  Status post stenting of posterior descending artery with 2.25 x 24 mm Synergy drug eluting stent with no significant residual stenosis and EL-3 flow  2.  Status post balloon angioplasty and stenting of mid to distal left anterior descending artery with 2 x 30 mm Cain drug eluting stent with no significant restenosis in the stented segment but residual diffuse disease in the distal left anterior descending artery with EL-3 flow     Procedure:  1.  Percutaneous intervention of the posterior descending artery of the right coronary artery  2.  Percutaneous coronary intervention of the left anterior descending artery  3.  Supervision of moderate conscious sedation        Transthoracic Echo Report 5/14/2021  Prior echo 8/13/20, the EF has mildly improved.  Left ventricular ejection fraction is visually estimated to be 35%.  Mild aortic stenosis.  Hello unable to estimate pulmonary artery pressure due to an inadequate   tricuspid regurgitant jet.    Assessment:     1. High risk medication use  EC-ECHOCARDIOGRAM COMPLETE W/O CONT    Basic Metabolic Panel   2. ACC/AHA stage C systolic heart failure (HCC)  EC-ECHOCARDIOGRAM COMPLETE W/O CONT    Basic Metabolic Panel   3. HTN (hypertension), malignant  EC-ECHOCARDIOGRAM COMPLETE W/O CONT    Basic Metabolic Panel   4. Heart failure, NYHA class 2 (HCC)     5. Status post insertion of drug eluting coronary artery stent     6. Coronary artery disease involving native coronary artery of native heart without angina pectoris     7. Ischemic cardiomyopathy     8. Mixed hyperlipidemia     9. Essential hypertension     10. S/P MVR (mitral valve replacement)     11. Type 2 diabetes mellitus with hyperglycemia, with long-term current use of insulin ([facility])         Medical Decision Making:  Today's Assessment / Status / Plan:   1. HFrEF, Stage C, Class 2, LVEF 35%: Based on physical examination findings, patient is euvolemic. No JVD, lungs are clear to auscultation, no pitting edema in bilateral lower extremities, no ascites.  -Heart failure due to ischemic cardiomyopathy  -ACE-I/ARB/ARNI: Restart home losartan, looks like his PCP had him on olmesartan, patient not sure why he does not have his bottle here with him today.  He states he brought in all of his current medications.  -Evidence Based Beta-blocker: Reduce and continue Toprol-XL at 75 mg daily  -Aldosterone Antagonist: Continue spironolactone 25 mg daily  -Diuretic: Currently not on diuretics  -SGLT2 inhibitor: None, previously was unable to afford due to insurance coverage, will consider at follow-up visit  -Labs: BMP in 3 weeks  -Echocardiogram ordered, if LVEF not >35%, then will  discuss/consider ICD for primary Prevention or discuss patient to get back on medical therapy  -Reinforced s/sx of worsening heart failure with patient and weight monitoring. Pt verbalizes understanding. Pt to call office or RTC if present.  Patient to purchase a scale today  -PUMP line number [phone] (PUMP)  -Heart Failure Education: Education reinforced  -Advanced care planning: Advanced directive and POLST to be discussed at later date     2.  CAD, s/p PCI/CARLOS to the PDA and LAD on 8/19/2020  -Continue aspirin 81 mg daily  -Continue atorvastatin 80 mg daily    3.  Hypertension: BP elevated  -Restarted on losartan  -continue other recommendations per above    4.  Hyperlipidemia:  -Recent LDL 42 on 3/3/2022  -Continue atorvastatin 80 mg daily  -Liver function stable    5.  Mitral valve replacement in 2016:  -Valve functioning normally per last echo    6.  Diabetes:  -Continue Metformin 1000 mg twice a day  -Continue insulin 70/30 30 units at bedtime  -Continue follow-up with PCP and has a referral to endocrinology    FU in clinic in 3 weeks with medications.  Sooner if needed.    Patient verbalizes understanding and agrees with the plan of care.     PLEASE NOTE: This Note was created using voice recognition Software. I have made every reasonable attempt to correct obvious errors, but I expect that there are errors of grammar and possibly content that I did not discover before finalizing the note

## 2022-04-13 DIAGNOSIS — Z79.899 HIGH RISK MEDICATION USE: ICD-10-CM

## 2022-04-13 DIAGNOSIS — I10 HTN (HYPERTENSION), MALIGNANT: ICD-10-CM

## 2022-04-13 DIAGNOSIS — I50.20 ACC/AHA STAGE C SYSTOLIC HEART FAILURE (HCC): ICD-10-CM

## 2022-04-15 ENCOUNTER — HOSPITAL ENCOUNTER (OUTPATIENT)
Facility: MEDICAL CENTER | Age: 67
End: 2022-04-15
Attending: STUDENT IN AN ORGANIZED HEALTH CARE EDUCATION/TRAINING PROGRAM
Payer: COMMERCIAL

## 2022-04-15 ENCOUNTER — HOSPITAL ENCOUNTER (EMERGENCY)
Facility: MEDICAL CENTER | Age: 67
End: 2022-04-15
Attending: EMERGENCY MEDICINE
Payer: COMMERCIAL

## 2022-04-15 VITALS
BODY MASS INDEX: 31.14 KG/M2 | SYSTOLIC BLOOD PRESSURE: 161 MMHG | HEIGHT: 66 IN | HEART RATE: 63 BPM | WEIGHT: 193.78 LBS | RESPIRATION RATE: 15 BRPM | OXYGEN SATURATION: 94 % | DIASTOLIC BLOOD PRESSURE: 87 MMHG | TEMPERATURE: 97.5 F

## 2022-04-15 DIAGNOSIS — H53.9 VISUAL CHANGES: ICD-10-CM

## 2022-04-15 DIAGNOSIS — H53.8 BLURRED VISION: ICD-10-CM

## 2022-04-15 LAB
BASOPHILS # BLD AUTO: 0.5 % (ref 0–1.8)
BASOPHILS # BLD: 0.04 K/UL (ref 0–0.12)
CRP SERPL HS-MCNC: <0.3 MG/DL (ref 0–0.75)
EOSINOPHIL # BLD AUTO: 0.64 K/UL (ref 0–0.51)
EOSINOPHIL NFR BLD: 8.3 % (ref 0–6.9)
ERYTHROCYTE [DISTWIDTH] IN BLOOD BY AUTOMATED COUNT: 42.5 FL (ref 35.9–50)
ERYTHROCYTE [SEDIMENTATION RATE] IN BLOOD BY WESTERGREN METHOD: 7 MM/HOUR (ref 0–20)
HCT VFR BLD AUTO: 40.7 % (ref 42–52)
HGB BLD-MCNC: 13.7 G/DL (ref 14–18)
IMM GRANULOCYTES # BLD AUTO: 0.03 K/UL (ref 0–0.11)
IMM GRANULOCYTES NFR BLD AUTO: 0.4 % (ref 0–0.9)
LYMPHOCYTES # BLD AUTO: 2.13 K/UL (ref 1–4.8)
LYMPHOCYTES NFR BLD: 27.7 % (ref 22–41)
MCH RBC QN AUTO: 29.4 PG (ref 27–33)
MCHC RBC AUTO-ENTMCNC: 33.7 G/DL (ref 33.7–35.3)
MCV RBC AUTO: 87.3 FL (ref 81.4–97.8)
MONOCYTES # BLD AUTO: 0.89 K/UL (ref 0–0.85)
MONOCYTES NFR BLD AUTO: 11.6 % (ref 0–13.4)
NEUTROPHILS # BLD AUTO: 3.97 K/UL (ref 1.82–7.42)
NEUTROPHILS NFR BLD: 51.5 % (ref 44–72)
NRBC # BLD AUTO: 0 K/UL
NRBC BLD-RTO: 0 /100 WBC
PLATELET # BLD AUTO: 192 K/UL (ref 164–446)
PMV BLD AUTO: 11.2 FL (ref 9–12.9)
RBC # BLD AUTO: 4.66 M/UL (ref 4.7–6.1)
WBC # BLD AUTO: 7.7 K/UL (ref 4.8–10.8)

## 2022-04-15 PROCEDURE — 86140 C-REACTIVE PROTEIN: CPT

## 2022-04-15 PROCEDURE — 99282 EMERGENCY DEPT VISIT SF MDM: CPT

## 2022-04-15 PROCEDURE — 85025 COMPLETE CBC W/AUTO DIFF WBC: CPT

## 2022-04-15 PROCEDURE — 86480 TB TEST CELL IMMUN MEASURE: CPT

## 2022-04-15 PROCEDURE — 85549 MURAMIDASE: CPT

## 2022-04-15 PROCEDURE — 82164 ANGIOTENSIN I ENZYME TEST: CPT

## 2022-04-15 PROCEDURE — 85652 RBC SED RATE AUTOMATED: CPT

## 2022-04-15 RX ORDER — HYDROMORPHONE HYDROCHLORIDE 1 MG/ML
INJECTION, SOLUTION INTRAMUSCULAR; INTRAVENOUS; SUBCUTANEOUS
Status: DISCONTINUED
Start: 2022-04-15 | End: 2022-04-15 | Stop reason: HOSPADM

## 2022-04-15 ASSESSMENT — FIBROSIS 4 INDEX: FIB4 SCORE: 1.29

## 2022-04-15 NOTE — ED TRIAGE NOTES
"Chief Complaint   Patient presents with   • Blurred Vision   • Sent by MD     Pt to ED from [facility] was sent here and told he needs a surgery but states nothing was explained to him and is unable to elaborate-- sent by MD for further testing. Pt denies pain   Pt primarily Mont[place]in speaking   Language line: Kell [id number]    BP (!) 182/88   Pulse 70   Temp 35.8 °C (96.5 °F) (Temporal)   Resp 14   Ht 1.676 m (5' 6\")   Wt 87.9 kg (193 lb 12.6 oz)   SpO2 93%   BMI 31.28 kg/m²     "

## 2022-04-15 NOTE — DISCHARGE INSTRUCTIONS
Please see your doctor in follow-up and use my chart to understand your lab results with your doctor    Surgery scheduled for May 24  All lab work your doctor wanted has been drawn

## 2022-04-15 NOTE — ED PROVIDER NOTES
ED Provider Note    CHIEF COMPLAINT  Chief Complaint   Patient presents with   • Blurred Vision   • Sent by MD BETANCOURT  Chino Deluca is a 67 y.o. male who presents after being sent in by his eye doctor for lab work.  History through  yields that the patient has accidentally checked into the emergency department and states that he has a scheduled surgery on May 24 and was sent in to [facility] for blood work but not to see the emergency physician.  He says this is a mistake.  The patient says that he does not drive and has some blurry vision has been going on for many months.  Is status post CABG in 2016 single-vessel but denies any chest pain denies any current other complaints such as fever, chills, sweats    ROS  Pertinent negative fever.    PAST MEDICAL HISTORY  Past Medical History:   Diagnosis Date   • Cataract     OU-had surgery   • Diabetes ([facility])     Takes Insulin and oral medication   • Full dentures    • Glaucoma     OU Had Surgery   • Heart valve disease     Pts. friend states, Heart Valve Replacement At [facility] 2016    • HFrEF (heart failure with reduced ejection fraction) ([facility])    • High cholesterol    • Hypertension    • Pneumonia     H/O 2016   • Snoring        FAMILY HISTORY  Family History   Problem Relation Age of Onset   • No Known Problems Mother    • Diabetes Father        SOCIAL HISTORY   reports that he has never smoked. He has never used smokeless tobacco. He reports that he does not drink alcohol and does not use drugs.    SURGICAL HISTORY  Past Surgical History:   Procedure Laterality Date   • VITRECTOMY POSTERIOR Left 6/9/2020    Procedure: VITRECTOMY, POSTERIOR PORTION- MAMBRANE PEEL POSS LASER GAS OR OIL;  Surgeon: Deonte Miguel M.D.;  Location: SURGERY SAME DAY [facility];  Service: Ophthalmology   • OTHER      cataract bilateral   • OTHER      Surgery For Glaucoma OU    • OTHER CARDIAC SURGERY      Per pts. friend Heart valve replacement 2016 [facility]       CURRENT  "MEDICATIONS  Home Medications     Reviewed by Divina Portillo R.N. (Registered Nurse) on 04/15/22 at 0831  Med List Status: <None>   Medication Last Dose Status   aspirin EC 81 MG EC tablet  Active   atorvastatin (LIPITOR) 40 MG Tab  Active   insulin 70/30 (HUMULIN/NOVOLIN) (70-30) 100 UNIT/ML Suspension  Active   metFORMIN (GLUCOPHAGE) 500 MG Tab  Active   metoprolol SR (TOPROL XL) 25 MG TABLET SR 24 HR  Active   olmesartan (BENICAR) 20 MG Tab  Active   spironolactone (ALDACTONE) 25 MG Tab  Active   vitamin D (CHOLECALCIFEROL) 1000 Unit (25 mcg) Tab  Active                ALLERGIES  No Known Allergies    PHYSICAL EXAM  VITAL SIGNS: BP (!) 182/88   Pulse 70   Temp 35.8 °C (96.5 °F) (Temporal)   Resp 14   Ht 1.676 m (5' 6\")   Wt 87.9 kg (193 lb 12.6 oz)   SpO2 93%   BMI 31.28 kg/m²    Constitutional: Well developed, Well nourished, No acute distress, Non-toxic appearance.   HENT: Unremarkable  Eyes: PERRLA, EOMI, no proptosis, no periorbital edema or erythema and no discharge  Neurologic: Neurologically intact  Psychiatric:       COURSE & MEDICAL DECISION MAKING  Pertinent Labs & Imaging studies reviewed. (See chart for details)  The patient is accidentally checked in the emergency department for typical preoperative labs and we have ordered those under his eye doctor's name as a courtesy and are discharging him.  He understands that his labs will be available on my chart and he should discuss these with his doctor in preparation for his surgery on May 24    FINAL IMPRESSION  1. Visual changes    2. Blurred vision            Electronically signed by: Gus Wilson M.D., 4/15/2022 9:15 AM    The note accurately reflects work and decisions made by me.  Gus Wilson M.D.  4/15/2022  9:17 AM      "

## 2022-04-15 NOTE — ED NOTES
Lab and phlebotomy called for patient's outpatient lab prescriptions for elective surgery for May and patient in no need of emergency services but agreed to labs to be done today for eye surgery in May. ER phlebotomist May G at bedside drawing labs as ordered on paper prescriptions from eye doctor.

## 2022-04-15 NOTE — ED NOTES
Pt able to ambulate to GRN 23 from the lobby with steady gait. Pt changed into gown and placed on cardiac monitor. Chart up for ERP.

## 2022-04-17 LAB — ACE SERPL-CCNC: 48 U/L (ref 9–67)

## 2022-04-18 LAB
GAMMA INTERFERON BACKGROUND BLD IA-ACNC: 0.03 IU/ML
M TB IFN-G BLD-IMP: NEGATIVE
M TB IFN-G CD4+ BCKGRND COR BLD-ACNC: 0 IU/ML
MITOGEN IGNF BCKGRD COR BLD-ACNC: >10 IU/ML
QFT TB2 - NIL TBQ2: 0 IU/ML

## 2022-04-22 ENCOUNTER — OFFICE VISIT (OUTPATIENT)
Dept: CARDIOLOGY | Facility: MEDICAL CENTER | Age: 67
End: 2022-04-22
Payer: COMMERCIAL

## 2022-04-22 VITALS
SYSTOLIC BLOOD PRESSURE: 140 MMHG | BODY MASS INDEX: 31.34 KG/M2 | HEIGHT: 66 IN | OXYGEN SATURATION: 95 % | RESPIRATION RATE: 16 BRPM | WEIGHT: 195 LBS | HEART RATE: 70 BPM | DIASTOLIC BLOOD PRESSURE: 72 MMHG

## 2022-04-22 DIAGNOSIS — I50.9 HEART FAILURE, NYHA CLASS 2 (HCC): ICD-10-CM

## 2022-04-22 DIAGNOSIS — I25.10 CORONARY ARTERY DISEASE INVOLVING NATIVE CORONARY ARTERY OF NATIVE HEART WITHOUT ANGINA PECTORIS: ICD-10-CM

## 2022-04-22 DIAGNOSIS — Z79.4 TYPE 2 DIABETES MELLITUS WITH HYPERGLYCEMIA, WITH LONG-TERM CURRENT USE OF INSULIN (HCC): ICD-10-CM

## 2022-04-22 DIAGNOSIS — Z95.2 S/P MVR (MITRAL VALVE REPLACEMENT): ICD-10-CM

## 2022-04-22 DIAGNOSIS — E78.2 MIXED HYPERLIPIDEMIA: ICD-10-CM

## 2022-04-22 DIAGNOSIS — I10 HTN (HYPERTENSION), MALIGNANT: ICD-10-CM

## 2022-04-22 DIAGNOSIS — I50.20 ACC/AHA STAGE C SYSTOLIC HEART FAILURE (HCC): ICD-10-CM

## 2022-04-22 DIAGNOSIS — I25.5 ISCHEMIC CARDIOMYOPATHY: ICD-10-CM

## 2022-04-22 DIAGNOSIS — Z79.899 HIGH RISK MEDICATION USE: ICD-10-CM

## 2022-04-22 DIAGNOSIS — Z95.5 STATUS POST INSERTION OF DRUG ELUTING CORONARY ARTERY STENT: ICD-10-CM

## 2022-04-22 DIAGNOSIS — E11.65 TYPE 2 DIABETES MELLITUS WITH HYPERGLYCEMIA, WITH LONG-TERM CURRENT USE OF INSULIN (HCC): ICD-10-CM

## 2022-04-22 PROBLEM — E88.810 METABOLIC SYNDROME: Status: ACTIVE | Noted: 2022-03-11

## 2022-04-22 PROBLEM — E11.42 DIABETIC PERIPHERAL NEUROPATHY (HCC): Status: ACTIVE | Noted: 2022-01-06

## 2022-04-22 PROBLEM — E66.9 OBESE: Status: ACTIVE | Noted: 2020-09-23

## 2022-04-22 PROBLEM — E13.319 RETINOPATHY DUE TO SECONDARY DIABETES MELLITUS (HCC): Status: ACTIVE | Noted: 2022-03-08

## 2022-04-22 PROBLEM — E55.9 VITAMIN D DEFICIENCY: Status: ACTIVE | Noted: 2019-08-24

## 2022-04-22 PROBLEM — R80.8 OTHER PROTEINURIA: Status: ACTIVE | Noted: 2022-01-06

## 2022-04-22 LAB — LYSOZYME SERPL-MCNC: 5.7 UG/ML (ref 3–12.8)

## 2022-04-22 PROCEDURE — 99213 OFFICE O/P EST LOW 20 MIN: CPT | Performed by: NURSE PRACTITIONER

## 2022-04-22 ASSESSMENT — FIBROSIS 4 INDEX: FIB4 SCORE: 1.78

## 2022-04-22 ASSESSMENT — ENCOUNTER SYMPTOMS
MYALGIAS: 0
SHORTNESS OF BREATH: 1
COUGH: 0
ORTHOPNEA: 0
BLURRED VISION: 0
PND: 0
CLAUDICATION: 0
ABDOMINAL PAIN: 0
DIZZINESS: 1
FEVER: 0
PALPITATIONS: 0

## 2022-04-22 NOTE — PROGRESS NOTES
Chief Complaint   Patient presents with   • Coronary Artery Disease     F/V Dx: Coronary artery disease involving native coronary artery of native heart without angina pectoris   • Hypertension       Subjective:   Chino Deluca is a 67 y.o. male who presents today for follow up on his heart Failure.    Using Caryn, # [id number] on Ipad    Patient was last seen in clinic on 4/1/2022. During that visit, patient was encouraged to bring in his medications to his follow-up visit.  Patient was also recommended to restart losartan and not take olmesartan.  Patient recommended to get follow-up lab testing.    Patient comes in the office today without his medications, he is not sure if he restarted losartan or if he is taking olmesartan.  He reports he is taking 7 medications.    He reports his home weights are around 195 pounds at home    He reports continued shortness of breath and dizziness.  He denies chest pain, palpitations, orthopnea, PND.    Patient does have uncontrolled diabetes and has not yet changed his diet.  He does have a referral to endocrinology.  His last A1c was greater than 14 on 3/3/2022    Additonally, patient has the following medical problems:    -Hospitalization on 5/13/2021 through 5/16/2021.  Patient presented with shortness of breath.  Patient had not been taking his Brilinta due to cost.  He was diuresed and his symptoms improved.  His medications were adjusted.    -Ischemic cardiomyopathy, LVEF 35%    -CAD, s/p high risk PCI/CARLOS to PDA, mid to distal LAD on 8/19/2020    -Diabetes: taking metformin    -HTN    -Mitral valve replacement in 2016  Past Medical History:   Diagnosis Date   • Cataract     OU-had surgery   • Diabetes ([facility])     Takes Insulin and oral medication   • Full dentures    • Glaucoma     OU Had Surgery   • Heart valve disease     Pts. friend states, Heart Valve Replacement At [facility] 2016    • HFrEF (heart failure with reduced ejection fraction) ([facility])     • High cholesterol    • Hypertension    • Pneumonia     H/O 2016   • Snoring      Past Surgical History:   Procedure Laterality Date   • VITRECTOMY POSTERIOR Left 6/9/2020    Procedure: VITRECTOMY, POSTERIOR PORTION- MAMBRANE PEEL POSS LASER GAS OR OIL;  Surgeon: Deonte Miguel M.D.;  Location: SURGERY SAME DAY [facility];  Service: Ophthalmology   • OTHER      cataract bilateral   • OTHER      Surgery For Glaucoma OU    • OTHER CARDIAC SURGERY      Per pts. friend Heart valve replacement 2016 [facility]     Family History   Problem Relation Age of Onset   • No Known Problems Mother    • Diabetes Father      Social History     Socioeconomic History   • Marital status:      Spouse name: Not on file   • Number of children: Not on file   • Years of education: Not on file   • Highest education level: Not on file   Occupational History   • Not on file   Tobacco Use   • Smoking status: Never Smoker   • Smokeless tobacco: Never Used   Vaping Use   • Vaping Use: Never used   Substance and Sexual Activity   • Alcohol use: No   • Drug use: No   • Sexual activity: Yes     Partners: Female   Other Topics Concern   • Not on file   Social History Narrative   • Not on file     Social Determinants of Health     Financial Resource Strain: Medium Risk   • Difficulty of Paying Living Expenses: Somewhat hard   Food Insecurity: Food Insecurity Present   • Worried About Running Out of Food in the Last Year: Sometimes true   • Ran Out of Food in the Last Year: Never true   Transportation Needs: Unmet Transportation Needs   • Lack of Transportation (Medical): Yes   • Lack of Transportation (Non-Medical): Yes   Physical Activity: Not on file   Stress: Not on file   Social Connections: Not on file   Intimate Partner Violence: Not on file   Housing Stability: Not on file     No Known Allergies  Outpatient Encounter Medications as of 4/22/2022   Medication Sig Dispense Refill   • olmesartan (BENICAR) 20 MG Tab Take 1 Tablet by mouth  "every day. 30 Tablet 11   • spironolactone (ALDACTONE) 25 MG Tab Take 1 tablet by mouth every day. 30 tablet 11   • atorvastatin (LIPITOR) 40 MG Tab Take 80 mg by mouth every day.     • vitamin D (CHOLECALCIFEROL) 1000 Unit (25 mcg) Tab Take 1,000 Units by mouth every day.     • aspirin EC 81 MG EC tablet Take 1 Tab by mouth every day. 30 Tab 11   • metoprolol SR (TOPROL XL) 25 MG TABLET SR 24 HR Take 3 Tabs by mouth every evening. 30 Tab 11   • metFORMIN (GLUCOPHAGE) 500 MG Tab Take 1,000 mg by mouth 2 times a day.     • insulin 70/30 (HUMULIN/NOVOLIN) (70-30) 100 UNIT/ML Suspension Inject 15 Units as instructed 2 Times a Day. (Patient taking differently: Inject 30 Units under the skin at bedtime.) 10 mL 0     No facility-administered encounter medications on file as of 4/22/2022.     Review of Systems   Constitutional: Negative for fever and malaise/fatigue.   Eyes: Negative for blurred vision.   Respiratory: Positive for shortness of breath (occ with exertion ). Negative for cough.    Cardiovascular: Negative for chest pain, palpitations, orthopnea, claudication, leg swelling and PND.   Gastrointestinal: Negative for abdominal pain.   Musculoskeletal: Negative for myalgias.   Neurological: Positive for dizziness (with position changes ).   All other systems reviewed and are negative.       Objective:   /72 (BP Location: Left arm, Patient Position: Sitting, BP Cuff Size: Adult)   Pulse 70   Resp 16   Ht 1.676 m (5' 6\")   Wt 88.5 kg (195 lb)   SpO2 95%   BMI 31.47 kg/m²     Physical Exam  Vitals reviewed.   Constitutional:       Appearance: He is well-developed.   HENT:      Head: Normocephalic and atraumatic.   Eyes:      Pupils: Pupils are equal, round, and reactive to light.   Neck:      Vascular: No JVD.   Cardiovascular:      Rate and Rhythm: Normal rate and regular rhythm.      Heart sounds: Normal heart sounds.   Pulmonary:      Effort: Pulmonary effort is normal. No respiratory distress.      " Breath sounds: Normal breath sounds. No wheezing or rales.   Abdominal:      General: Bowel sounds are normal.      Palpations: Abdomen is soft.   Musculoskeletal:      Cervical back: Normal range of motion and neck supple.      Right lower leg: No edema.      Left lower leg: No edema.   Skin:     General: Skin is warm and dry.   Neurological:      Mental Status: He is alert and oriented to person, place, and time.   Psychiatric:         Behavior: Behavior normal.       Lab Results   Component Value Date/Time    CHOLSTRLTOT 183 05/07/2020 03:09 AM    LDL see below 05/07/2020 03:09 AM    HDL 31 (A) 05/07/2020 03:09 AM    TRIGLYCERIDE 587 (H) 05/07/2020 03:09 AM       Lab Results   Component Value Date/Time    SODIUM 135 05/16/2021 03:28 AM    POTASSIUM 3.7 05/16/2021 03:28 AM    CHLORIDE 94 (L) 05/16/2021 03:28 AM    CO2 29 05/16/2021 03:28 AM    GLUCOSE 202 (H) 05/16/2021 03:28 AM    BUN 34 (H) 05/16/2021 03:28 AM    CREATININE 1.01 05/16/2021 03:28 AM     Lab Results   Component Value Date/Time    ALKPHOSPHAT 132 (H) 05/14/2021 05:51 AM    ASTSGOT 36 05/14/2021 05:51 AM    ALTSGPT 50 05/14/2021 05:51 AM    TBILIRUBIN 1.2 05/14/2021 05:51 AM      Transthoracic Echo Report 8/13/2020  No prior study is available for comparison.   Contrast was used to enhance visualization of the endocardial border.  Moderate concentric left ventricular hypertrophy.  Left ventricular ejection fraction is visually estimated to be 25%.  Akinesis of the mid to distal inferior wall, distal anterior wall , and   mid to distal septum.  [place] is mildly dyskinetic without thrombus.  A reliable estimation of diastolic function cannot be made due to   mitral valve disease.  Known mitral valve replacement which is functioning normally with   appropriate transvalvular gradient.  Mean transvalvular gradient is 4  mmHg at a heart rate of 83 BPM.  Mild aortic insufficiency.  Aortic sclerosis without stenosis.  Unable to estimate pulmonary artery  pressure due to an inadequate   tricuspid regurgitant jet.  A reliable estimation of diastolic function cannot be made due to   mitral valve disease.  Normal pericardium without effusion.  Results via [place] text to ordering physician at 08:42 hrs      Coronary angiogram 8/19/2020   Referring physician: Dr. Tamy Chairez   Pre-operative Diagnosis:  Two-vessel coronary disease (total occlusion of mid left anterior descending artery and 90% mid posterior descending artery stenosis) with new onset systolic heart failure and prior mitral valve replacement felt to be for poor surgical candidate for coronary bypass grafting     Post-operative Diagnosis:   1.  Status post stenting of posterior descending artery with 2.25 x 24 mm Synergy drug eluting stent with no significant residual stenosis and EL-3 flow  2.  Status post balloon angioplasty and stenting of mid to distal left anterior descending artery with 2 x 30 mm Cain drug eluting stent with no significant restenosis in the stented segment but residual diffuse disease in the distal left anterior descending artery with EL-3 flow     Procedure:  1.  Percutaneous intervention of the posterior descending artery of the right coronary artery  2.  Percutaneous coronary intervention of the left anterior descending artery  3.  Supervision of moderate conscious sedation       Transthoracic Echo Report 5/14/2021  Prior echo 8/13/20, the EF has mildly improved.  Left ventricular ejection fraction is visually estimated to be 35%.  Mild aortic stenosis.  Hello unable to estimate pulmonary artery pressure due to an inadequate   tricuspid regurgitant jet.    Assessment:     1. ACC/AHA stage C systolic heart failure (HCC)     2. Heart failure, NYHA class 2 (HCC)     3. High risk medication use     4. Coronary artery disease involving native coronary artery of native heart without angina pectoris     5. Ischemic cardiomyopathy     6. Status post insertion of drug eluting coronary  artery stent     7. HTN (hypertension), malignant     8. Mixed hyperlipidemia     9. S/P MVR (mitral valve replacement)     10. Type 2 diabetes mellitus with hyperglycemia, with long-term current use of insulin (HCC)         Medical Decision Making:  Today's Assessment / Status / Plan:   1. HFrEF, Stage C, Class 2, LVEF 35%: Based on physical examination findings, patient is euvolemic. No JVD, lungs are clear to auscultation, no pitting edema in bilateral lower extremities, no ascites.  -Heart failure due to ischemic cardiomyopathy  -Unsure of the accuracy of his medication list at this time, discussed the importance with patient to bring in his meds at next visit  -ACE-I/ARB/ARNI: Unsure if patient is taking losartan, olmesartan or neither  -Evidence Based Beta-blocker: Continue Toprol-XL at 75 mg daily???  -Aldosterone Antagonist: Continue spironolactone 25 mg daily???  -Diuretic: Currently not on diuretics  -SGLT2 inhibitor: None, previously was unable to afford due to insurance coverage, will consider at follow-up visit after being seen updated med list  -Labs: None  -Echocardiogram scheduled on 5/20/2022, if LVEF not >35%, then will discuss/consider ICD for primary Prevention or discuss patient to get back on medical therapy  -Reinforced s/sx of worsening heart failure with patient and weight monitoring. Pt verbalizes understanding. Pt to call office or RTC if present.  Patient to purchase a scale today  -PUMP line number [phone] (PUMP)  -Heart Failure Education: Education reinforced  -Advanced care planning: Advanced directive and POLST to be discussed at later date     2.  CAD, s/p PCI/CARLOS to the PDA and LAD on 8/19/2020  -Continue aspirin 81 mg daily  -Continue atorvastatin 80 mg daily    3.  Hypertension: BP better   -Continue current regimen for now    4.  Hyperlipidemia:  -Recent LDL 42 on 3/3/2022  -Continue atorvastatin 80 mg daily  -Liver function stable    5.  Mitral valve replacement in  2016:  -Valve functioning normally per last echo    6.  Diabetes:  -Continue Metformin 1000 mg twice a day  -Continue insulin 70/30 30 units at bedtime  -Continue follow-up with PCP and has a referral to endocrinology    FU in clinic in 4 weeks with medications.  Sooner if needed.    Patient verbalizes understanding and agrees with the plan of care.     PLEASE NOTE: This Note was created using voice recognition Software. I have made every reasonable attempt to correct obvious errors, but I expect that there are errors of grammar and possibly content that I did not discover before finalizing the note

## 2022-05-17 ENCOUNTER — HOSPITAL ENCOUNTER (OUTPATIENT)
Facility: MEDICAL CENTER | Age: 67
End: 2022-05-17
Attending: OPHTHALMOLOGY | Admitting: OPHTHALMOLOGY
Payer: COMMERCIAL

## 2022-05-20 ENCOUNTER — PRE-ADMISSION TESTING (OUTPATIENT)
Dept: ADMISSIONS | Facility: MEDICAL CENTER | Age: 67
End: 2022-05-20
Attending: OPHTHALMOLOGY
Payer: COMMERCIAL

## 2022-05-20 VITALS — HEIGHT: 76 IN | WEIGHT: 192.68 LBS | BODY MASS INDEX: 23.46 KG/M2

## 2022-05-20 DIAGNOSIS — Z01.812 PRE-OPERATIVE LABORATORY EXAMINATION: ICD-10-CM

## 2022-05-20 DIAGNOSIS — Z01.810 PRE-OPERATIVE CARDIOVASCULAR EXAMINATION: ICD-10-CM

## 2022-05-20 LAB
ANION GAP SERPL CALC-SCNC: 12 MMOL/L (ref 7–16)
BUN SERPL-MCNC: 16 MG/DL (ref 8–22)
CALCIUM SERPL-MCNC: 9.1 MG/DL (ref 8.5–10.5)
CHLORIDE SERPL-SCNC: 100 MMOL/L (ref 96–112)
CO2 SERPL-SCNC: 23 MMOL/L (ref 20–33)
CREAT SERPL-MCNC: 0.87 MG/DL (ref 0.5–1.4)
EKG IMPRESSION: NORMAL
EST. AVERAGE GLUCOSE BLD GHB EST-MCNC: 303 MG/DL
GFR SERPLBLD CREATININE-BSD FMLA CKD-EPI: 94 ML/MIN/1.73 M 2
GLUCOSE SERPL-MCNC: 405 MG/DL (ref 65–99)
HBA1C MFR BLD: 12.2 % (ref 4–5.6)
POTASSIUM SERPL-SCNC: 4.6 MMOL/L (ref 3.6–5.5)
SODIUM SERPL-SCNC: 135 MMOL/L (ref 135–145)

## 2022-05-20 PROCEDURE — 80048 BASIC METABOLIC PNL TOTAL CA: CPT

## 2022-05-20 PROCEDURE — 93005 ELECTROCARDIOGRAM TRACING: CPT

## 2022-05-20 PROCEDURE — 36415 COLL VENOUS BLD VENIPUNCTURE: CPT

## 2022-05-20 PROCEDURE — 83036 HEMOGLOBIN GLYCOSYLATED A1C: CPT

## 2022-05-20 PROCEDURE — 93010 ELECTROCARDIOGRAM REPORT: CPT | Performed by: INTERNAL MEDICINE

## 2022-05-20 RX ORDER — DAPSONE 100 MG/1
100 TABLET ORAL DAILY
COMMUNITY
End: 2022-08-08

## 2022-05-20 ASSESSMENT — FIBROSIS 4 INDEX: FIB4 SCORE: 1.78

## 2022-05-20 NOTE — OR NURSING
During PAT appointment, patient reported he takes Dapsone for his heart.  Patient was instructed to follow MD instructions regarding his Aspirin, insulin, and Dapson prior to surgery.  Patient stated verbal understanding; states he is going to his doctor's office today and will ask about medication instructions.  Anesthesia to be asked to please call patient regarding Dapsone instructions prior to surgery.  This was added to [facility] anesthesia notification form to be emailed to [facility] Anesthesia by charge RN at the end of the day. Anesthesia also to be notified of the following history: diabetes, HTN, heart valve replacement, and heart failure.

## 2022-06-07 ENCOUNTER — HOSPITAL ENCOUNTER (OUTPATIENT)
Facility: MEDICAL CENTER | Age: 67
End: 2022-06-07
Attending: OPHTHALMOLOGY | Admitting: OPHTHALMOLOGY
Payer: COMMERCIAL

## 2022-06-14 ENCOUNTER — PRE-ADMISSION TESTING (OUTPATIENT)
Dept: ADMISSIONS | Facility: MEDICAL CENTER | Age: 67
End: 2022-06-14
Attending: OPHTHALMOLOGY
Payer: COMMERCIAL

## 2022-06-14 VITALS — BODY MASS INDEX: 23.14 KG/M2 | HEIGHT: 76 IN | WEIGHT: 190.04 LBS

## 2022-06-14 DIAGNOSIS — Z01.812 PRE-OPERATIVE LABORATORY EXAMINATION: ICD-10-CM

## 2022-06-14 DIAGNOSIS — Z01.810 PRE-OPERATIVE CARDIOVASCULAR EXAMINATION: ICD-10-CM

## 2022-06-14 LAB
ANION GAP SERPL CALC-SCNC: 12 MMOL/L (ref 7–16)
BUN SERPL-MCNC: 26 MG/DL (ref 8–22)
CALCIUM SERPL-MCNC: 8 MG/DL (ref 8.5–10.5)
CHLORIDE SERPL-SCNC: 103 MMOL/L (ref 96–112)
CO2 SERPL-SCNC: 19 MMOL/L (ref 20–33)
CREAT SERPL-MCNC: 1.21 MG/DL (ref 0.5–1.4)
EST. AVERAGE GLUCOSE BLD GHB EST-MCNC: 246 MG/DL
GFR SERPLBLD CREATININE-BSD FMLA CKD-EPI: 65 ML/MIN/1.73 M 2
GLUCOSE SERPL-MCNC: 214 MG/DL (ref 65–99)
HBA1C MFR BLD: 10.2 % (ref 4–5.6)
POTASSIUM SERPL-SCNC: 6 MMOL/L (ref 3.6–5.5)
SODIUM SERPL-SCNC: 134 MMOL/L (ref 135–145)

## 2022-06-14 PROCEDURE — 80048 BASIC METABOLIC PNL TOTAL CA: CPT

## 2022-06-14 PROCEDURE — 83036 HEMOGLOBIN GLYCOSYLATED A1C: CPT

## 2022-06-14 PROCEDURE — 36415 COLL VENOUS BLD VENIPUNCTURE: CPT

## 2022-06-14 ASSESSMENT — FIBROSIS 4 INDEX: FIB4 SCORE: 1.78

## 2022-06-14 NOTE — OR NURSING
Discussed with  how to take diabetic medication-insulin & metforman, states knows how to take it the night before surgery. Told not to take am of surgery   Encouraged to call PMD at [facility] to clarify, if he has questions.  Given medication sheet with medications he should not take, am of surgery 6/28  All instructions given in [place].

## 2022-07-07 ENCOUNTER — HOSPITAL ENCOUNTER (OUTPATIENT)
Facility: MEDICAL CENTER | Age: 67
End: 2022-07-07
Attending: OPHTHALMOLOGY | Admitting: OPHTHALMOLOGY
Payer: COMMERCIAL

## 2022-08-08 ENCOUNTER — OFFICE VISIT (OUTPATIENT)
Dept: CARDIOLOGY | Facility: MEDICAL CENTER | Age: 67
End: 2022-08-08
Payer: COMMERCIAL

## 2022-08-08 VITALS
DIASTOLIC BLOOD PRESSURE: 68 MMHG | RESPIRATION RATE: 16 BRPM | HEIGHT: 76 IN | SYSTOLIC BLOOD PRESSURE: 120 MMHG | BODY MASS INDEX: 23.62 KG/M2 | HEART RATE: 65 BPM | WEIGHT: 194 LBS | OXYGEN SATURATION: 94 %

## 2022-08-08 DIAGNOSIS — I10 HTN (HYPERTENSION), MALIGNANT: ICD-10-CM

## 2022-08-08 DIAGNOSIS — I25.10 CORONARY ARTERY DISEASE INVOLVING NATIVE CORONARY ARTERY OF NATIVE HEART WITHOUT ANGINA PECTORIS: ICD-10-CM

## 2022-08-08 DIAGNOSIS — Z79.899 HIGH RISK MEDICATION USE: ICD-10-CM

## 2022-08-08 DIAGNOSIS — Z79.4 TYPE 2 DIABETES MELLITUS WITH HYPERGLYCEMIA, WITH LONG-TERM CURRENT USE OF INSULIN (HCC): ICD-10-CM

## 2022-08-08 DIAGNOSIS — Z95.5 STATUS POST INSERTION OF DRUG ELUTING CORONARY ARTERY STENT: ICD-10-CM

## 2022-08-08 DIAGNOSIS — E78.2 MIXED HYPERLIPIDEMIA: ICD-10-CM

## 2022-08-08 DIAGNOSIS — I50.20 ACC/AHA STAGE C SYSTOLIC HEART FAILURE (HCC): ICD-10-CM

## 2022-08-08 DIAGNOSIS — Z95.2 S/P MVR (MITRAL VALVE REPLACEMENT): ICD-10-CM

## 2022-08-08 DIAGNOSIS — R06.09 DYSPNEA ON EXERTION: ICD-10-CM

## 2022-08-08 DIAGNOSIS — I25.5 ISCHEMIC CARDIOMYOPATHY: ICD-10-CM

## 2022-08-08 DIAGNOSIS — I50.9 HEART FAILURE, NYHA CLASS 2 (HCC): ICD-10-CM

## 2022-08-08 DIAGNOSIS — E11.65 TYPE 2 DIABETES MELLITUS WITH HYPERGLYCEMIA, WITH LONG-TERM CURRENT USE OF INSULIN (HCC): ICD-10-CM

## 2022-08-08 PROCEDURE — 99215 OFFICE O/P EST HI 40 MIN: CPT | Performed by: INTERNAL MEDICINE

## 2022-08-08 RX ORDER — FUROSEMIDE 40 MG/1
1 TABLET ORAL DAILY
COMMUNITY
End: 2022-08-08

## 2022-08-08 RX ORDER — CLOPIDOGREL BISULFATE 75 MG/1
1 TABLET ORAL DAILY
COMMUNITY
End: 2022-08-08

## 2022-08-08 RX ORDER — OLMESARTAN MEDOXOMIL 20 MG/1
20 TABLET ORAL DAILY
Qty: 30 TABLET | Refills: 11 | Status: SHIPPED | OUTPATIENT
Start: 2022-08-08 | End: 2023-05-30

## 2022-08-08 RX ORDER — DAPSONE 100 MG/1
100 TABLET ORAL NIGHTLY
COMMUNITY
Start: 2022-04-21 | End: 2023-02-22

## 2022-08-08 RX ORDER — SPIRONOLACTONE 25 MG/1
25 TABLET ORAL DAILY
Qty: 30 TABLET | Refills: 11 | Status: SHIPPED | OUTPATIENT
Start: 2022-08-08 | End: 2023-05-30

## 2022-08-08 RX ORDER — DAPAGLIFLOZIN 10 MG/1
10 TABLET, FILM COATED ORAL DAILY
Qty: 90 TABLET | Refills: 3 | Status: SHIPPED | OUTPATIENT
Start: 2022-08-08 | End: 2023-07-27 | Stop reason: SDUPTHER

## 2022-08-08 ASSESSMENT — ENCOUNTER SYMPTOMS
MEMORY LOSS: 0
SHORTNESS OF BREATH: 1
MYALGIAS: 0
DIAPHORESIS: 0
BLURRED VISION: 0
COUGH: 0
DEPRESSION: 0
HEADACHES: 0
PALPITATIONS: 0
ABDOMINAL PAIN: 0
BRUISES/BLEEDS EASILY: 0
DOUBLE VISION: 0
DIZZINESS: 0
FALLS: 0
SENSORY CHANGE: 0
FEVER: 0

## 2022-08-08 ASSESSMENT — FIBROSIS 4 INDEX: FIB4 SCORE: 1.78

## 2022-08-08 NOTE — PROGRESS NOTES
Chief Complaint   Patient presents with   • Coronary Artery Disease     F/V Dx:Coronary artery disease involving native coronary artery of native heart without angina pectoris   • Hyperlipidemia       Subjective     Chino Deluca is a 67 y.o. male who presents today for cardiac care and management due to ischemic cardiomyopathy with LV function of 35%, established coronary tear disease status post PCI and stent placement to the PDA, mid to distal LAD in August 2020, hypertension, hyperlipidemia, mitral valve replacement in 2016, along with diabetes.    Patient is feeling better these days. Does get winded upon walking up inclines or for distance. No symptoms at rest or with daily living activities.    Patient has not had his recent transthoracic echocardiogram to further reassess his cardiac function and valvular function.    Past Medical History:   Diagnosis Date   • Bowel habit changes     Consipation   • Breath shortness     Patient reports in the past.  Pt. reports this resolved after heart surgery in 2016.   • Cataract     OU-had surgery   • Dental disorder    • Diabetes (HCC)     Takes Insulin and oral medication   • Full dentures    • Glaucoma     OU Had Surgery   • Heart valve disease     Pts. friend states, Heart Valve Replacement At [facility] 2016    • HFrEF (heart failure with reduced ejection fraction) ([facility])    • High cholesterol    • Hypertension    • Pneumonia     H/O 2016   • Snoring      Past Surgical History:   Procedure Laterality Date   • VITRECTOMY POSTERIOR Left 6/9/2020    Procedure: VITRECTOMY, POSTERIOR PORTION- MAMBRANE PEEL POSS LASER GAS OR OIL;  Surgeon: Deonte Miguel M.D.;  Location: SURGERY SAME DAY [facility];  Service: Ophthalmology   • OTHER      cataract bilateral   • OTHER      Surgery For Glaucoma OU    • OTHER CARDIAC SURGERY      Per pts. friend Heart valve replacement 2016 [facility]     Family History   Problem Relation Age of Onset   • No Known Problems Mother    •  Diabetes Father      Social History     Socioeconomic History   • Marital status:      Spouse name: Not on file   • Number of children: Not on file   • Years of education: Not on file   • Highest education level: Not on file   Occupational History   • Not on file   Tobacco Use   • Smoking status: Former Smoker     Packs/day: 0.25     Years: 15.00     Pack years: 3.75     Types: Cigarettes     Quit date:      Years since quittin.6   • Smokeless tobacco: Never Used   Vaping Use   • Vaping Use: Never used   Substance and Sexual Activity   • Alcohol use: No   • Drug use: No   • Sexual activity: Yes     Partners: Female   Other Topics Concern   • Not on file   Social History Narrative   • Not on file     Social Determinants of Health     Financial Resource Strain: Not on file   Food Insecurity: Not on file   Transportation Needs: Not on file   Physical Activity: Not on file   Stress: Not on file   Social Connections: Not on file   Intimate Partner Violence: Not on file   Housing Stability: Not on file     No Known Allergies  Outpatient Encounter Medications as of 2022   Medication Sig Dispense Refill   • dapsone 100 MG Tab Take 100 mg by mouth every day.     • clopidogrel (PLAVIX) 75 MG Tab Take 1 Tablet by mouth every day.     • furosemide (LASIX) 40 MG Tab Take 1 Tablet by mouth every day.     • dapsone 100 MG Tab Take 100 mg by mouth every day.     • olmesartan (BENICAR) 20 MG Tab Take 1 Tablet by mouth every day. 30 Tablet 11   • spironolactone (ALDACTONE) 25 MG Tab Take 1 tablet by mouth every day. 30 tablet 11   • atorvastatin (LIPITOR) 40 MG Tab Take 80 mg by mouth every day.     • vitamin D (CHOLECALCIFEROL) 1000 Unit (25 mcg) Tab Take 1,000 Units by mouth every day.     • aspirin EC 81 MG EC tablet Take 1 Tab by mouth every day. 30 Tab 11   • metoprolol SR (TOPROL XL) 25 MG TABLET SR 24 HR Take 3 Tabs by mouth every evening. 30 Tab 11   • metFORMIN (GLUCOPHAGE) 500 MG Tab Take 1,000 mg by  "mouth 2 times a day.     • insulin 70/30 (HUMULIN/NOVOLIN) (70-30) 100 UNIT/ML Suspension Inject 15 Units as instructed 2 Times a Day. (Patient taking differently: Inject 30 Units under the skin at bedtime.) 10 mL 0     No facility-administered encounter medications on file as of 8/8/2022.     Review of Systems   Constitutional: Negative for diaphoresis and fever.   HENT: Negative for nosebleeds.    Eyes: Negative for blurred vision and double vision.   Respiratory: Positive for shortness of breath. Negative for cough.    Cardiovascular: Negative for chest pain and palpitations.   Gastrointestinal: Negative for abdominal pain.   Genitourinary: Negative for dysuria and frequency.   Musculoskeletal: Negative for falls and myalgias.   Skin: Negative for rash.   Neurological: Negative for dizziness, sensory change and headaches.   Endo/Heme/Allergies: Does not bruise/bleed easily.   Psychiatric/Behavioral: Negative for depression and memory loss.              Objective     BP (!) 0/0 (BP Location: Left arm, Patient Position: Sitting, BP Cuff Size: Adult)   Ht 1.93 m (6' 4\")   BMI 23.13 kg/m²     Physical Exam  Vitals and nursing note reviewed.   Constitutional:       General: He is not in acute distress.     Appearance: He is not diaphoretic.   HENT:      Head: Normocephalic and atraumatic.      Right Ear: External ear normal.      Left Ear: External ear normal.      Nose: No congestion or rhinorrhea.   Eyes:      General:         Right eye: No discharge.         Left eye: No discharge.   Neck:      Thyroid: No thyromegaly.      Vascular: No JVD.   Cardiovascular:      Rate and Rhythm: Normal rate and regular rhythm.      Pulses: Normal pulses.   Pulmonary:      Effort: No respiratory distress.   Abdominal:      General: There is no distension.      Tenderness: There is no abdominal tenderness.   Musculoskeletal:         General: No swelling or tenderness.      Right lower leg: No edema.      Left lower leg: No edema. "   Skin:     General: Skin is warm and dry.   Neurological:      Mental Status: He is alert and oriented to person, place, and time.      Cranial Nerves: No cranial nerve deficit.   Psychiatric:         Behavior: Behavior normal.                Assessment & Plan     1. ACC/AHA stage C systolic heart failure (HCC)     2. Heart failure, NYHA class 2 (HCC)     3. Coronary artery disease involving native coronary artery of native heart without angina pectoris     4. Ischemic cardiomyopathy     5. Status post insertion of drug eluting coronary artery stent     6. Mixed hyperlipidemia     7. S/P MVR (mitral valve replacement)     8. HTN (hypertension), malignant     9. Type 2 diabetes mellitus with hyperglycemia, with long-term current use of insulin (HCC)     10. High risk medication use         Medical Decision Making: Today's Assessment/Status/Plan:   Ischemic Cardiomyopathy:  Chronically illed condition which requires ongoing close monitoring and treatment to improve survival rate along with decreasing risk of clinical decompensation sudden cardiac death and hospitalization.    Today, based on physical examination findings, patient is euvolemic. No JVD, lungs are clear to auscultation, no pitting edema in bilateral lower extremities, no ascites.     Dry weight is 194 lbs.     Continue Olmesartan 20 mg daily, Toprol XL will be increased to 100 mg daily.     Aldactone antagonist with Spironolactone 25 mg daily.    Based on recent data on SGLT2 and heart failure with reduced ejection fraction, patient will be benefited from Farxiga 10 mg p.o. once a day for further reduction in mortality and hospitalization with absolute risk reduction of 4.9%.  Therefore, I will start patient on Farxiga 10 mg p.o. once a day.  Risks and benefits were explained to patient and patient has agreed to proceed.    We will consider ICD placement for primary prevention in 3 months if left ventricular systolic function remains less than 35% after  optimization of evidence based heart failure medical regimen and compliance with medications.    Coronary arterial disease:  Continue asa, BB, ARBI and statin at current dose.    Hypertension:  Optimize control using cardiomyopathy medical regimen as well.    Hyperlipidemia:  Optimize statin as within guidelines of CAD treatment as above.       Of note, during the care of this patient, I spent a significant amount of time explaining the nature of the disease process, reviewing all possible imaging studies, blood test results to patient.  The overall care of this patient require a higher level of care than usual due to the multiple comorbidities along with ongoing issues of congestive heart failure that put this patient at risk for sudden cardiac death, increased mortality, and hospitalization.  This patient also requires close monitoring with at least monthly blood test to monitor renal function and electrolytes due to the ongoing dynamic changes of medical therapy titration protocol to ensure optimal benefits for the overall care of this patient.

## 2022-08-11 ENCOUNTER — HOSPITAL ENCOUNTER (OUTPATIENT)
Dept: LAB | Facility: MEDICAL CENTER | Age: 67
End: 2022-08-11
Attending: INTERNAL MEDICINE
Payer: COMMERCIAL

## 2022-08-11 DIAGNOSIS — Z79.899 HIGH RISK MEDICATION USE: ICD-10-CM

## 2022-08-11 DIAGNOSIS — I50.20 ACC/AHA STAGE C SYSTOLIC HEART FAILURE (HCC): ICD-10-CM

## 2022-08-11 DIAGNOSIS — I50.9 HEART FAILURE, NYHA CLASS 2 (HCC): ICD-10-CM

## 2022-08-11 LAB
ANION GAP SERPL CALC-SCNC: 10 MMOL/L (ref 7–16)
BUN SERPL-MCNC: 24 MG/DL (ref 8–22)
CALCIUM SERPL-MCNC: 8.6 MG/DL (ref 8.5–10.5)
CHLORIDE SERPL-SCNC: 105 MMOL/L (ref 96–112)
CO2 SERPL-SCNC: 24 MMOL/L (ref 20–33)
CREAT SERPL-MCNC: 0.88 MG/DL (ref 0.5–1.4)
GFR SERPLBLD CREATININE-BSD FMLA CKD-EPI: 94 ML/MIN/1.73 M 2
GLUCOSE SERPL-MCNC: 122 MG/DL (ref 65–99)
NT-PROBNP SERPL IA-MCNC: 759 PG/ML (ref 0–125)
POTASSIUM SERPL-SCNC: 4.3 MMOL/L (ref 3.6–5.5)
SODIUM SERPL-SCNC: 139 MMOL/L (ref 135–145)

## 2022-08-11 PROCEDURE — 36415 COLL VENOUS BLD VENIPUNCTURE: CPT

## 2022-08-11 PROCEDURE — 80048 BASIC METABOLIC PNL TOTAL CA: CPT

## 2022-08-11 PROCEDURE — 83880 ASSAY OF NATRIURETIC PEPTIDE: CPT

## 2022-08-19 ENCOUNTER — PRE-ADMISSION TESTING (OUTPATIENT)
Dept: ADMISSIONS | Facility: MEDICAL CENTER | Age: 67
End: 2022-08-19
Attending: OPHTHALMOLOGY
Payer: COMMERCIAL

## 2022-08-19 RX ORDER — METOPROLOL SUCCINATE 25 MG/1
25 TABLET, EXTENDED RELEASE ORAL NIGHTLY
COMMUNITY
End: 2023-02-22

## 2022-08-19 ASSESSMENT — FIBROSIS 4 INDEX: FIB4 SCORE: 1.78

## 2022-08-19 NOTE — OR NURSING
Instructed patient to call his primary and surgeon's office for insulin and ASA instructions.  Patient verbalizes understanding,

## 2022-08-23 ENCOUNTER — HOSPITAL ENCOUNTER (OUTPATIENT)
Facility: MEDICAL CENTER | Age: 67
End: 2022-08-23
Attending: OPHTHALMOLOGY | Admitting: OPHTHALMOLOGY
Payer: COMMERCIAL

## 2022-08-23 ENCOUNTER — ANESTHESIA EVENT (OUTPATIENT)
Dept: SURGERY | Facility: MEDICAL CENTER | Age: 67
End: 2022-08-23
Payer: COMMERCIAL

## 2022-08-23 ENCOUNTER — ANESTHESIA (OUTPATIENT)
Dept: SURGERY | Facility: MEDICAL CENTER | Age: 67
End: 2022-08-23
Payer: COMMERCIAL

## 2022-08-23 VITALS
WEIGHT: 186.51 LBS | BODY MASS INDEX: 29.27 KG/M2 | RESPIRATION RATE: 14 BRPM | TEMPERATURE: 97.3 F | SYSTOLIC BLOOD PRESSURE: 152 MMHG | HEIGHT: 67 IN | HEART RATE: 77 BPM | DIASTOLIC BLOOD PRESSURE: 75 MMHG | OXYGEN SATURATION: 91 %

## 2022-08-23 LAB — GLUCOSE BLD STRIP.AUTO-MCNC: 314 MG/DL (ref 65–99)

## 2022-08-23 PROCEDURE — 700101 HCHG RX REV CODE 250: Performed by: OPHTHALMOLOGY

## 2022-08-23 PROCEDURE — 00145 ANES PX EYE VITREORTNL SURG: CPT | Performed by: STUDENT IN AN ORGANIZED HEALTH CARE EDUCATION/TRAINING PROGRAM

## 2022-08-23 PROCEDURE — 82962 GLUCOSE BLOOD TEST: CPT

## 2022-08-23 PROCEDURE — 160029 HCHG SURGERY MINUTES - 1ST 30 MINS LEVEL 4: Performed by: OPHTHALMOLOGY

## 2022-08-23 PROCEDURE — 160002 HCHG RECOVERY MINUTES (STAT): Performed by: OPHTHALMOLOGY

## 2022-08-23 PROCEDURE — 700111 HCHG RX REV CODE 636 W/ 250 OVERRIDE (IP): Performed by: STUDENT IN AN ORGANIZED HEALTH CARE EDUCATION/TRAINING PROGRAM

## 2022-08-23 PROCEDURE — 700105 HCHG RX REV CODE 258: Performed by: OPHTHALMOLOGY

## 2022-08-23 PROCEDURE — 700101 HCHG RX REV CODE 250: Performed by: STUDENT IN AN ORGANIZED HEALTH CARE EDUCATION/TRAINING PROGRAM

## 2022-08-23 PROCEDURE — 700111 HCHG RX REV CODE 636 W/ 250 OVERRIDE (IP): Performed by: OPHTHALMOLOGY

## 2022-08-23 PROCEDURE — 160035 HCHG PACU - 1ST 60 MINS PHASE I: Performed by: OPHTHALMOLOGY

## 2022-08-23 PROCEDURE — 160025 RECOVERY II MINUTES (STATS): Performed by: OPHTHALMOLOGY

## 2022-08-23 PROCEDURE — 160009 HCHG ANES TIME/MIN: Performed by: OPHTHALMOLOGY

## 2022-08-23 PROCEDURE — 160041 HCHG SURGERY MINUTES - EA ADDL 1 MIN LEVEL 4: Performed by: OPHTHALMOLOGY

## 2022-08-23 PROCEDURE — 160046 HCHG PACU - 1ST 60 MINS PHASE II: Performed by: OPHTHALMOLOGY

## 2022-08-23 PROCEDURE — 700101 HCHG RX REV CODE 250

## 2022-08-23 PROCEDURE — 160048 HCHG OR STATISTICAL LEVEL 1-5: Performed by: OPHTHALMOLOGY

## 2022-08-23 DEVICE — SILICONE OIL 10ML: Type: IMPLANTABLE DEVICE | Site: EYE | Status: FUNCTIONAL

## 2022-08-23 RX ORDER — METOCLOPRAMIDE HYDROCHLORIDE 5 MG/ML
INJECTION INTRAMUSCULAR; INTRAVENOUS PRN
Status: DISCONTINUED | OUTPATIENT
Start: 2022-08-23 | End: 2022-08-23 | Stop reason: SURG

## 2022-08-23 RX ORDER — TROPICAMIDE 10 MG/ML
SOLUTION/ DROPS OPHTHALMIC
Status: COMPLETED
Start: 2022-08-23 | End: 2022-08-23

## 2022-08-23 RX ORDER — HYDROMORPHONE HYDROCHLORIDE 1 MG/ML
0.2 INJECTION, SOLUTION INTRAMUSCULAR; INTRAVENOUS; SUBCUTANEOUS
Status: DISCONTINUED | OUTPATIENT
Start: 2022-08-23 | End: 2022-08-23 | Stop reason: HOSPADM

## 2022-08-23 RX ORDER — PHENYLEPHRINE HYDROCHLORIDE 25 MG/ML
SOLUTION/ DROPS OPHTHALMIC
Status: COMPLETED
Start: 2022-08-23 | End: 2022-08-23

## 2022-08-23 RX ORDER — BALANCED SALT SOLUTION ENRICHED WITH BICARBONATE, DEXTROSE, AND GLUTATHIONE
KIT INTRAOCULAR
Status: DISCONTINUED | OUTPATIENT
Start: 2022-08-23 | End: 2022-08-23 | Stop reason: HOSPADM

## 2022-08-23 RX ORDER — NEOMYCIN SULFATE, POLYMYXIN B SULFATE, AND DEXAMETHASONE 3.5; 10000; 1 MG/G; [USP'U]/G; MG/G
OINTMENT OPHTHALMIC
Status: DISCONTINUED | OUTPATIENT
Start: 2022-08-23 | End: 2022-08-23 | Stop reason: HOSPADM

## 2022-08-23 RX ORDER — SODIUM CHLORIDE, SODIUM LACTATE, POTASSIUM CHLORIDE, CALCIUM CHLORIDE 600; 310; 30; 20 MG/100ML; MG/100ML; MG/100ML; MG/100ML
INJECTION, SOLUTION INTRAVENOUS CONTINUOUS
Status: ACTIVE | OUTPATIENT
Start: 2022-08-23 | End: 2022-08-23

## 2022-08-23 RX ORDER — CEFAZOLIN SODIUM 1 G/3ML
INJECTION, POWDER, FOR SOLUTION INTRAMUSCULAR; INTRAVENOUS
Status: DISCONTINUED | OUTPATIENT
Start: 2022-08-23 | End: 2022-08-23 | Stop reason: HOSPADM

## 2022-08-23 RX ORDER — MOXIFLOXACIN 5 MG/ML
SOLUTION/ DROPS OPHTHALMIC
Status: COMPLETED
Start: 2022-08-23 | End: 2022-08-23

## 2022-08-23 RX ORDER — FLURBIPROFEN SODIUM 0.3 MG/ML
SOLUTION/ DROPS OPHTHALMIC
Status: COMPLETED
Start: 2022-08-23 | End: 2022-08-23

## 2022-08-23 RX ORDER — ONDANSETRON 2 MG/ML
4 INJECTION INTRAMUSCULAR; INTRAVENOUS
Status: DISCONTINUED | OUTPATIENT
Start: 2022-08-23 | End: 2022-08-23 | Stop reason: HOSPADM

## 2022-08-23 RX ORDER — HYDROMORPHONE HYDROCHLORIDE 1 MG/ML
0.4 INJECTION, SOLUTION INTRAMUSCULAR; INTRAVENOUS; SUBCUTANEOUS
Status: DISCONTINUED | OUTPATIENT
Start: 2022-08-23 | End: 2022-08-23 | Stop reason: HOSPADM

## 2022-08-23 RX ORDER — HALOPERIDOL 5 MG/ML
1 INJECTION INTRAMUSCULAR
Status: DISCONTINUED | OUTPATIENT
Start: 2022-08-23 | End: 2022-08-23 | Stop reason: HOSPADM

## 2022-08-23 RX ORDER — LIDOCAINE HYDROCHLORIDE 20 MG/ML
INJECTION, SOLUTION EPIDURAL; INFILTRATION; INTRACAUDAL; PERINEURAL PRN
Status: DISCONTINUED | OUTPATIENT
Start: 2022-08-23 | End: 2022-08-23 | Stop reason: SURG

## 2022-08-23 RX ORDER — BALANCED SALT SOLUTION 6.4; .75; .48; .3; 3.9; 1.7 MG/ML; MG/ML; MG/ML; MG/ML; MG/ML; MG/ML
SOLUTION OPHTHALMIC
Status: DISCONTINUED | OUTPATIENT
Start: 2022-08-23 | End: 2022-08-23 | Stop reason: HOSPADM

## 2022-08-23 RX ORDER — BALANCED SALT SOLUTION ENRICHED WITH BICARBONATE, DEXTROSE, AND GLUTATHIONE
KIT INTRAOCULAR
Status: DISCONTINUED
Start: 2022-08-23 | End: 2022-08-23 | Stop reason: HOSPADM

## 2022-08-23 RX ORDER — OXYCODONE HCL 5 MG/5 ML
10 SOLUTION, ORAL ORAL
Status: DISCONTINUED | OUTPATIENT
Start: 2022-08-23 | End: 2022-08-23 | Stop reason: HOSPADM

## 2022-08-23 RX ORDER — SODIUM CHLORIDE, SODIUM LACTATE, POTASSIUM CHLORIDE, CALCIUM CHLORIDE 600; 310; 30; 20 MG/100ML; MG/100ML; MG/100ML; MG/100ML
INJECTION, SOLUTION INTRAVENOUS CONTINUOUS
Status: DISCONTINUED | OUTPATIENT
Start: 2022-08-23 | End: 2022-08-23 | Stop reason: HOSPADM

## 2022-08-23 RX ORDER — OXYCODONE HCL 5 MG/5 ML
5 SOLUTION, ORAL ORAL
Status: DISCONTINUED | OUTPATIENT
Start: 2022-08-23 | End: 2022-08-23 | Stop reason: HOSPADM

## 2022-08-23 RX ORDER — DEXAMETHASONE SODIUM PHOSPHATE 4 MG/ML
INJECTION, SOLUTION INTRA-ARTICULAR; INTRALESIONAL; INTRAMUSCULAR; INTRAVENOUS; SOFT TISSUE
Status: DISCONTINUED | OUTPATIENT
Start: 2022-08-23 | End: 2022-08-23 | Stop reason: HOSPADM

## 2022-08-23 RX ORDER — TRIAMCINOLONE ACETONIDE 40 MG/ML
INJECTION, SUSPENSION INTRA-ARTICULAR; INTRAMUSCULAR
Status: DISCONTINUED | OUTPATIENT
Start: 2022-08-23 | End: 2022-08-23 | Stop reason: HOSPADM

## 2022-08-23 RX ORDER — ONDANSETRON 2 MG/ML
INJECTION INTRAMUSCULAR; INTRAVENOUS PRN
Status: DISCONTINUED | OUTPATIENT
Start: 2022-08-23 | End: 2022-08-23 | Stop reason: SURG

## 2022-08-23 RX ORDER — HYDROMORPHONE HYDROCHLORIDE 1 MG/ML
0.1 INJECTION, SOLUTION INTRAMUSCULAR; INTRAVENOUS; SUBCUTANEOUS
Status: DISCONTINUED | OUTPATIENT
Start: 2022-08-23 | End: 2022-08-23 | Stop reason: HOSPADM

## 2022-08-23 RX ORDER — CYCLOPENTOLATE HYDROCHLORIDE 10 MG/ML
SOLUTION/ DROPS OPHTHALMIC
Status: COMPLETED
Start: 2022-08-23 | End: 2022-08-23

## 2022-08-23 RX ADMIN — LIDOCAINE HYDROCHLORIDE 80 MG: 20 INJECTION, SOLUTION EPIDURAL; INFILTRATION; INTRACAUDAL at 14:54

## 2022-08-23 RX ADMIN — TROPICAMIDE 1 DROP: 10 SOLUTION/ DROPS OPHTHALMIC at 11:30

## 2022-08-23 RX ADMIN — PHENYLEPHRINE HYDROCHLORIDE 1 DROP: 25 SOLUTION/ DROPS OPHTHALMIC at 11:30

## 2022-08-23 RX ADMIN — METOCLOPRAMIDE 10 MG: 5 INJECTION, SOLUTION INTRAMUSCULAR; INTRAVENOUS at 15:07

## 2022-08-23 RX ADMIN — FLURBIPROFEN SODIUM 1 DROP: 0.3 SOLUTION/ DROPS OPHTHALMIC at 11:30

## 2022-08-23 RX ADMIN — MOXIFLOXACIN 1 DROP: 5 SOLUTION/ DROPS OPHTHALMIC at 11:30

## 2022-08-23 RX ADMIN — PROPOFOL 160 MG: 10 INJECTION, EMULSION INTRAVENOUS at 14:54

## 2022-08-23 RX ADMIN — CYCLOPENTOLATE HYDROCHLORIDE 1 DROP: 10 SOLUTION OPHTHALMIC at 11:31

## 2022-08-23 RX ADMIN — ONDANSETRON 4 MG: 2 INJECTION INTRAMUSCULAR; INTRAVENOUS at 15:24

## 2022-08-23 RX ADMIN — SODIUM CHLORIDE, POTASSIUM CHLORIDE, SODIUM LACTATE AND CALCIUM CHLORIDE: 600; 310; 30; 20 INJECTION, SOLUTION INTRAVENOUS at 11:31

## 2022-08-23 RX ADMIN — FENTANYL CITRATE 50 MCG: 50 INJECTION, SOLUTION INTRAMUSCULAR; INTRAVENOUS at 14:51

## 2022-08-23 ASSESSMENT — FIBROSIS 4 INDEX: FIB4 SCORE: 1.78

## 2022-08-23 NOTE — OR NURSING
1651 pt arrived from OR. Report received. Pt arouses to voice. On 6L mask. Dressing in place to right eye CDI. Per MD position either side.     1649 pt more awake, denies pain/nausea. Tolerating PO fluids. DR Miguel at bedside and updating pt ride.     1715 pt provided with jello snack.     1733 pt meets phase 2 criteria. Pt up to bathroom, able to void. Dressed with assistance.     1739 called pt alexandere Samuel to provide updates.    1751 pt maintaining O2 sats greater than 90% on room air.  d/c instructions discussed all questions answered. IV removed. Pt d/c to home with family, escorted out by staff.

## 2022-08-23 NOTE — DISCHARGE INSTRUCTIONS
HOME CARE INSTRUCTIONS    ACTIVITY: Rest and take it easy for the first 24 hours.  A responsible adult is recommended to remain with you during that time.  It is normal to feel sleepy.  We encourage you to not do anything that requires balance, judgment or coordination.    FOR 24 HOURS DO NOT:  Drive, operate machinery or run household appliances.  Drink beer or alcoholic beverages.  Make important decisions or sign legal documents.    SPECIAL INSTRUCTIONS:     Vitrectomy, Care After  This sheet gives you information about how to care for yourself after your procedure. Your health care provider may also give you more specific instructions. If you have problems or questions, contact your health care provider.  What can I expect after the procedure?  After the procedure, it is common to have:  A spot that looks like a bubble blocking your field of vision. This goes away over time.  A feeling like there is something in your eye.  Very blurry vision in the affected eye.  A dilated pupil.  Light sensitivity.  Difficulty seeing things up close.  Follow these instructions at home:  Eye care  Keep the area around your eye clean and dry.  If you were given an eye patch or eye shield, wear it as told by your health care provider.  If you were prescribed antibiotic eye drops, use them as told by your health care provider. Do not stop using the antibiotic even if your condition improves.  Wear sunglasses if your eyes are sensitive to light.  Do not use contact lenses until your health care provider approves.  Activity         You may be told to stay in a certain position for a period of time, such as sitting up or lying on your back or on your stomach. Make sure you do this as told by your health care provider.  Rest as told by your health care provider.  Avoid strenuous physical activity for as long as told by your health care provider. This includes bending over, lifting anything that is heavier than 5 lb (2.3 kg) or the  limit that you are told by your health care provider, and straining.  Ask your health care provider when you may have sex.  Do not drive until your health care provider approves.  Do not ride in an airplane until your health care provider approves.  General instructions  Take or apply over-the-counter and prescription medicines only as told by your health care provider. This includes any eye drops.  Keep all follow-up visits as told by your health care provider. This is important.  Contact a health care provider if:  Your eye becomes very red and painful.  You have any pus or discharge coming from your eye.  You have chills.  Your eyelid on either eye becomes swollen or stuck shut.  Get help right away if:  You have a fever.  You have severe pain.  You see small, drifting specks in front of your vision.  Part of your vision is covered by what looks like a black curtain that you cannot see through.  You have a sudden loss of vision.  Summary  After the procedure, it is common to have a spot that looks like a bubble blocking your field of vision. This goes away over time.  If you were given an eye patch or eye shield, wear it as told by your health care provider.  You may be told to stay in a certain position for a period of time, such as sitting up or lying on your back or on your stomach. Make sure you do this as told by your health care provider.  Take or apply over-the-counter and prescription medicines only as told by your health care provider. This includes any eye drops.  This information is not intended to replace advice given to you by your health care provider. Make sure you discuss any questions you have with your health care provider.  Document Released: 09/04/2012 Document Revised: 01/03/2020 Document Reviewed: 01/06/2020  Elsevier Patient Education © 2020 Elsevier Inc.      DIET: To avoid nausea, slowly advance diet as tolerated, avoiding spicy or greasy foods for the first day.  Add more substantial  food to your diet according to your physician's instructions.  Babies can be fed formula or breast milk as soon as they are hungry.  INCREASE FLUIDS AND FIBER TO AVOID CONSTIPATION.      MEDICATIONS: Resume taking daily medication.  Take prescribed pain medication with food.  If no medication is prescribed, you may take non-aspirin pain medication if needed.  PAIN MEDICATION CAN BE VERY CONSTIPATING.  Take a stool softener or laxative such as senokot, pericolace, or milk of magnesia if needed.    Prescription given for .  Last pain medication given at .    A follow-up appointment should be arranged with your doctor; call to schedule.    You should CALL YOUR PHYSICIAN if you develop:  Fever greater than 101 degrees F.  Pain not relieved by medication, or persistent nausea or vomiting.  Excessive bleeding (blood soaking through dressing) or unexpected drainage from the wound.  Extreme redness or swelling around the incision site, drainage of pus or foul smelling drainage.  Inability to urinate or empty your bladder within 8 hours.  Problems with breathing or chest pain.    You should call 911 if you develop problems with breathing or chest pain.  If you are unable to contact your doctor or surgical center, you should go to the nearest emergency room or urgent care center.  Physician's telephone #: Dr. Miguel at [phone]    MILD FLU-LIKE SYMPTOMS ARE NORMAL.  YOU MAY EXPERIENCE GENERALIZED MUSCLE ACHES, THROAT IRRITATION, HEADACHE AND/OR SOME NAUSEA.    If any questions arise, call your doctor.  If your doctor is not available, please feel free to call the Surgical Center at [phone].  The Center is open Monday through Friday from 7AM to 7PM.      A registered nurse may call you a few days after your surgery to see how you are doing after your procedure.    You may also receive a survey in the mail within the next two weeks and we ask that you take a few moments to complete the survey and return it to us.   Our goal is to provide you with very good care and we value your comments.     Depression / Suicide Risk    As you are discharged from this [facility] facility, it is important to learn how to keep safe from harming yourself.    Recognize the warning signs:  Abrupt changes in personality, positive or negative- including increase in energy   Giving away possessions  Change in eating patterns- significant weight changes-  positive or negative  Change in sleeping patterns- unable to sleep or sleeping all the time   Unwillingness or inability to communicate  Depression  Unusual sadness, discouragement and loneliness  Talk of wanting to die  Neglect of personal appearance   Rebelliousness- reckless behavior  Withdrawal from people/activities they love  Confusion- inability to concentrate     If you or a loved one observes any of these behaviors or has concerns about self-harm, here's what you can do:  Talk about it- your feelings and reasons for harming yourself  Remove any means that you might use to hurt yourself (examples: pills, rope, extension cords, firearm)  Get professional help from the community (Mental Health, Substance Abuse, psychological counseling)  Do not be alone:Call your Safe Contact- someone whom you trust who will be there for you.  Call your local CRISIS HOTLINE 191-5124 or 619-559-8792  Call your local Children's Mobile Crisis Response Team [place] (708) 021-4079 or www.[place]  Call the toll free National Suicide Prevention Hotlines   National Suicide Prevention Lifeline 320-275-JZEL (0244)  National Hope Line Network 800-SUICIDE (531-4172)    I acknowledge receipt and understanding of these Home Care instructions.      Vitrectomía, cuidados posteriores  Vitrectomy, Care After  Esta hoja le nancie información sobre cómo cuidarse después del procedimiento. El médico también podrá darle instrucciones más específicas. Comuníquese con el médico si tiene problemas o preguntas.  ¿Qué  puedo esperar después del procedimiento?  Después del procedimiento, es común tener los siguientes síntomas:  Jason suha que se ve doris jason burbuja que le bloquea el nani de visión. Esta desaparece con el tiempo.  Sensación de tener algo en el clarence.  Visión muy borrosa en el clarence afectado.  Jason pupila dilatada.  Sensibilidad a la harshil.  Dificultad para elio las cosas de cerca.  Siga estas instrucciones en casanova casa:  Cuidado de los ojos  Mantenga el área que rodea el clarence limpia y seca.  Si le indicaron que use un parche u otra protección ocular, úsela doris se lo haya indicado el médico.  Si le recetaron gotas oftálmicas con antibiótico, úselas doris se lo haya indicado el médico. No deje de usar el antibiótico aunque la afección mejore.  Use lentes de sol si tiene los ojos sensibles a la harshil.  No use lentes de contacto hasta que el médico lo autorice.  Actividad         Quizás le indiquen que permanezca en cierta posición rodrigo un período, por ejemplo, sentado o recostado de espalda o boca abajo. Asegúrese de hacer esto doris se lo haya indicado el médico.  [place]iane reposo doris se lo haya indicado el médico.  Evite la actividad física extenuante rodrigo el tiempo que le haya indicado el médico. [place] incluye doblarse, levantar cualquier objeto que pese más de 5 libras (2.3 kg) o el límite que le haya indicado el médico, y hacer esfuerzos.  Pregúntele al médico cuándo puede tener relaciones sexuales.  No conduzca hasta que el médico lo autorice.  No viaje en avión hasta que el médico lo autorice.  Instrucciones generales  [place] o aplíquese los medicamentos de venta zana y los recetados solamente doris se lo haya indicado el médico. [place] incluye cualquier clase de gotas oftálmicas.  Concurra a todas las visitas de seguimiento doris se lo haya indicado el médico. [place] es importante.  Comuníquese con un médico si:  El clarence se pone muy castanon y le duele.  Le sale pus o secreción del clarence.  Tiene escalofríos.  El párpado de cualquier clarence  se hincha o se pega.  Solicite ayuda inmediatamente si:  Tiene fiebre.  Siente dolor intenso.  Ve pequeñas manchas que flotan en casanova [place] de visión.  Parte de la visión está cubierta por algo que parece jason mike james, que no le permite elio.  Pierde la visión repentinamente.  Resumen  Después del procedimiento, es común tener un lugar que parece jason burbuja y le bloquea el [place] de visión. Esta desaparece con el tiempo.  Si le indicaron que use un parche u otra protección ocular, úsela doris se lo haya indicado el médico.  Quizás le indiquen que permanezca en cierta posición rodrigo un período, por ejemplo, sentado o recostado de espalda o boca abajo. Asegúrese de hacer esto doris se lo haya indicado el médico.  [place] o aplíquese los medicamentos de venta zana y los recetados solamente doris se lo haya indicado el médico. [place] incluye cualquier clase de gotas oftálmicas.  Esta información no tiene doris fin reemplazar el consejo del médico. Asegúrese de hacerle al médico cualquier pregunta que tenga.  Document Released: 12/06/2012 Document Revised: 02/12/2020 Document Reviewed: 02/12/2020  Elsevier Patient Education © 2020 Elsevier Inc.    Instrucciones Para [place]  (Home Care Instructions)    ACTIVIDAD: Descanse y tome todo con mucha calma las primeras 24 horas después de casanova cirugía.  Jason persona adulta responsable debe permanecer con usted rodrigo trevon periodo de tiempo.  Es normal sentirse sonoliento o sonolienta rodrigo esas primeras horas.  Le recomendamos que no deric nada que requiera equilibrio, jr decisiones a mucha coordinación de casanova parte.    NO DERIC ESTO PURANTE LAS PRIMERAS 24 HORAS:   Manejar o conducir algún vehiculo, operar maquinarias o utilizar electrodomesticos.   Beber cerveza o algún otro tipo de bebida alcohólica.   Jr decisiones importantes o firmar documentos legales.      DIETA: Para evitar las nauseas, prosiga despacito con casanova dieta a medida que pueda ir tolerándola mejor, evite comidas  muy condimentadas o grasosas rodrigo trevon primer día.  Vaya agregando comidas más substanciadas a casanova dieta a medida que asi lo indique casanova médica.  Los bebés pueden beber leche preparada o formula, ásl doris también leche del seno de la madre a medida que vayan teniendo hambre.  SIGA AGREGANDO LIQUIDOS Y COMIDAS CON FIBRA PARA EVITAR ESTREÑIMIENTO.      MEDICAMENTOS/MEDICINAS:  Vuelva a horacio carmelita medicamentos diarios.  [place] los medicamentos que se le prescribe con un poco de comida.  Si no le prescribe ningún tipo de medicamento, entonces puede horacio medicinas para el dolor que no contienen aspirina, si las necesita.  LAS MEDICINAS PARA EL DOLOR PUEDEN ESTREÑIRLE MUCHO.  [place] un suavizante para el excremento o materia fecal (stool softener) o un laxativo doris por ejemplo: senokot, pericolase, o leche de magnesia, si lo necesita.      Usted debe LIAMAR A CASANOVA MEDICO si tiene los siguientes síntomas:   -   Marta fiebre más donato de 101 grados Fahrenheit.   -   Un dolor incesante aún con los medicamentos, o nauseas y vómito persistente.   -   Un sangrado excesivo (ivon que traspasa los vendajes o gasas) o algúln tipo de drenaje inesperado que proviene de la henda.     -   Un color castanon exagerado o hinchazón alrededor del área en donde se le hizo incisión o umesh, o un drenaje de pus o con olor nia proveniente de la henda.   -    La inhabilidad de orinar o vaciar casanova vejiga en 8 horas.   -    Problemas con a respiración o marques en el pecho.    Usted debe llamar al 911 si se presentan problemas con el dolor al respirar o el pecho.  Si no se puede ponnoer en comunicación con un medica o con el centro de cirugía, usted debe ir a la estación de emergencia (emergency room) más cercana o a un centro de atención de urgencia (urgent care center).     LOS SÍNTOMAS DE UN LEVE RESFRIO SON MUY NORMALES.  ADEMÁS USTED PUEDE LLEGAR A SENTIR MARQUES GENERALES DE MÚSCULOS, IRRITACIÓN EN LA GARGANTA, MARQUES DE MARY Y/O UN POCO DE  NAUSEAS.    Sie tiene alguna pregunta, llame a casanova médico.  Si casanova médico no se encuentra disponible, por favor llame al Centro de Cirugía at [phone].  el Centro está abierto de Lunes a Viernes desde las 7:00 de la manana hasta las 5:00 de la noche.      Mi firma a continuación indica que he recibido y entiendco estas instrucciones acera de los cuidados en la casa (Home Care Instructions)    Usted recibirá jason encuesta en la correspondencia en las siguientes semanas y le pedimos que por favor tome un momento para completar carlos alberto encuesta y regresaría a hosotros.  Nuestro objetivó es brindarle un cuidado muy singh y par lo tanto apreciamos carmelita coméntanos.  Muchas fallon por nando escogido el [facility]ug[facility] de [facility].

## 2022-08-23 NOTE — ANESTHESIA PROCEDURE NOTES
Airway    Date/Time: 8/23/2022 2:56 PM  Performed by: Jose Ferreira M.D.  Authorized by: Jose Ferreira M.D.     Location:  OR  Urgency:  Elective  Indications for Airway Management:  Anesthesia      Spontaneous Ventilation: absent    Sedation Level:  Deep  Preoxygenated: Yes    Final Airway Type:  Supraglottic airway  Final Supraglottic Airway:  Standard LMA    SGA Size:  4  Number of Attempts at Approach:  1

## 2022-08-23 NOTE — ANESTHESIA PREPROCEDURE EVALUATION
Case: [id number] Date/Time: 08/23/22 6725    Procedure: VITRECTOMY ENDOLASER, MEMBRANE PEEL, POSSIBLE GAS OR OIL RIGHT EYE    Pre-op diagnosis: TRACTIONAL RETINAL DETACHMENT RIGHT EYE    Location: [facility] ROOM 24 / SURGERY SAME DAY [facility]    Surgeons: Deonte Miguel M.D.      CAD, CHF, no chest pain no dyspnea.  Followed by cardiology.    IDDM, did not take insulin yesterday.  Last A1c 8.4, improving.    No anesthesia problems.    Relevant Problems   CARDIAC   (positive) Coronary artery disease involving native coronary artery of native heart without angina pectoris   (positive) Essential hypertension   (positive) Hypertensive urgency         (positive) HALEY (acute kidney injury) (HCC)      ENDO   (positive) Type 2 diabetes mellitus with hyperglycemia, with long-term current use of insulin (HCC)       Physical Exam    Airway   Mallampati: II  TM distance: >3 FB  Neck ROM: full       Cardiovascular - normal exam  Rhythm: regular  Rate: normal     Dental - normal exam           Pulmonary - normal exam     Abdominal    Neurological - normal exam                 Anesthesia Plan    ASA 4   ASA physical status 4 criteria: severe reduction of ejection fractions    Plan - general       Airway plan will be LMA          Induction: intravenous    Postoperative Plan: Postoperative administration of opioids is intended.    Pertinent diagnostic labs and testing reviewed    Informed Consent:    Anesthetic plan and risks discussed with patient.    Use of blood products discussed with: patient whom consented to blood products.

## 2022-08-24 NOTE — ANESTHESIA POSTPROCEDURE EVALUATION
Patient: Chino Deluca    Procedure Summary     Date: 08/23/22 Room / Location: [facility] ROOM 24 / SURGERY SAME DAY [facility]    Anesthesia Start: 1448 Anesthesia Stop: 1653    Procedure: VITRECTOMY ENDOLASER, MEMBRANE PEEL, POSSIBLE GAS OR OIL RIGHT EYE Diagnosis: (TRACTIONAL RETINAL DETACHMENT RIGHT EYE)    Surgeons: Deonte Miguel M.D. Responsible Provider: Jose Ferreira M.D.    Anesthesia Type: general ASA Status: 4          Final Anesthesia Type: general  Last vitals  BP   Blood Pressure : (!) 157/73    Temp   36.3 °C (97.3 °F)    Pulse   78   Resp   16    SpO2   92 %      Anesthesia Post Evaluation    Patient location during evaluation: PACU  Patient participation: complete - patient participated  Level of consciousness: awake and alert    Airway patency: patent  Anesthetic complications: no  Cardiovascular status: hemodynamically stable  Respiratory status: acceptable  Hydration status: euvolemic    PONV: none          No notable events documented.     Nurse Pain Score: 0 (NPRS)

## 2022-08-24 NOTE — OP REPORT
OPERATIVE NOTE FOR TRACTIONAL RETINAL DETACHMENT SECONDARY TO PROLIFERATIVE DIABETIC RETINOPATHY      Pre-op Diagnosis:  1. 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     : Deonte Miguel M.D., Lester Ortiz M.D.     Procedure(s):  1-REPAIR OF COMPLEX RETINAL DETACHMENT, right eye    Complications:  None    Specimens:  None    Anesthesia:   General    Estimated Blood Loss: Less than 5cc    PREOPERATIVE FINDINGS:  Prior to the surgery, the risks and benefits were discussed at length with the patient, stressing that visual outcome could not be guaranteed, and informed consent was obtained.     PROCEDURE IN DETAIL:   The patient was brought to the operating room table and placed in supine position on the operating room table. General anesthesia was induced and the patient intubated without complications or difficulties. The eye was prepped and draped using Betadine, 5% Betadine drops were placed in the eye.     A 23-gauge pars plana vitrectomy set up was positioned, transconjunctival ports were placed in the inferotemporal, superotemporal and superonasal quadrants, 4.0 mm away from the limbus. An infusion cannula was attached to the inferotemporal port. Pars plana vitrectomy was initiated, carried as far anterior as fesable and as far posterior as removing the hyaloid face.     Severe fibrovascular membranes were noted in the entire posterior pole extending outside the arcades.    PFO was used to flatten the retina.Using the microvitrector and intraocular forceps, extensive membranes were carefully dissected from the surface of the retina. Then endodiathermy was applied to the multiple sites of NVE to minimize bleeding  in all quadrants of the retina and over the retinal vessels. Hemostasis was succesfully achieved with the endodiathermy.     Air-fluid exchange was then performed followed by endolaser in a PRP fashion. Silicone oil was then placed in the eye. Once the appropriate filling of the eye was obtained, the ports were removed and the sclerotomies were closed using transconjunctival 7-0 Vicryl. Intraocular pressure was adjusted to a physiological range.      Topical drops of Timolol, Atropine, and Maxitrol were placed on the eye and the eye was patched and shielded.The patient was extubated and returned to the recovery room in stable condition, having tolerated the procedure well. Patient will follow up tomorrow morning in the eye clinic for post op day one follow up.     General: WN/WD NAD  Neurology: A&Ox3, nonfocal, MACK x 4  Respiratory: CTA B/L  CV: RRR, S1S2.  Abdominal: Soft, NT, ND +BS.  Extremities: No edema, + peripheral pulses

## 2023-02-07 ENCOUNTER — TELEPHONE (OUTPATIENT)
Dept: CARDIOLOGY | Facility: MEDICAL CENTER | Age: 68
End: 2023-02-07
Payer: MEDICARE

## 2023-02-07 NOTE — TELEPHONE ENCOUNTER
Call placed to patient in regards to echocardiogram ordered by . No answer LVM to ask if completed outside of [facility].

## 2023-02-22 ENCOUNTER — PRE-ADMISSION TESTING (OUTPATIENT)
Dept: ADMISSIONS | Facility: MEDICAL CENTER | Age: 68
End: 2023-02-22
Attending: OPHTHALMOLOGY
Payer: COMMERCIAL

## 2023-02-22 DIAGNOSIS — Z01.810 PRE-OPERATIVE CARDIOVASCULAR EXAMINATION: ICD-10-CM

## 2023-02-22 DIAGNOSIS — Z01.812 PRE-OPERATIVE LABORATORY EXAMINATION: ICD-10-CM

## 2023-02-22 LAB
ANION GAP SERPL CALC-SCNC: 10 MMOL/L (ref 7–16)
BUN SERPL-MCNC: 12 MG/DL (ref 8–22)
CALCIUM SERPL-MCNC: 8.6 MG/DL (ref 8.5–10.5)
CHLORIDE SERPL-SCNC: 102 MMOL/L (ref 96–112)
CO2 SERPL-SCNC: 29 MMOL/L (ref 20–33)
CREAT SERPL-MCNC: 0.76 MG/DL (ref 0.5–1.4)
EST. AVERAGE GLUCOSE BLD GHB EST-MCNC: 146 MG/DL
GFR SERPLBLD CREATININE-BSD FMLA CKD-EPI: 98 ML/MIN/1.73 M 2
GLUCOSE SERPL-MCNC: 124 MG/DL (ref 65–99)
HBA1C MFR BLD: 6.7 % (ref 4–5.6)
POTASSIUM SERPL-SCNC: 4 MMOL/L (ref 3.6–5.5)
SODIUM SERPL-SCNC: 141 MMOL/L (ref 135–145)

## 2023-02-22 PROCEDURE — 93005 ELECTROCARDIOGRAM TRACING: CPT

## 2023-02-22 PROCEDURE — 80048 BASIC METABOLIC PNL TOTAL CA: CPT

## 2023-02-22 PROCEDURE — 36415 COLL VENOUS BLD VENIPUNCTURE: CPT

## 2023-02-22 PROCEDURE — 83036 HEMOGLOBIN GLYCOSYLATED A1C: CPT

## 2023-02-22 RX ORDER — METFORMIN HYDROCHLORIDE 500 MG/1
500 TABLET, EXTENDED RELEASE ORAL EVERY EVENING
COMMUNITY
Start: 2023-02-13

## 2023-02-22 RX ORDER — DAPSONE 100 MG/1
100 TABLET ORAL DAILY
COMMUNITY
Start: 2022-12-27 | End: 2023-05-30

## 2023-02-22 RX ORDER — METOPROLOL SUCCINATE 25 MG/1
75 TABLET, EXTENDED RELEASE ORAL DAILY
COMMUNITY
Start: 2022-12-27 | End: 2023-03-27

## 2023-02-22 RX ORDER — SPIRONOLACTONE 25 MG/1
12.5 TABLET ORAL DAILY
COMMUNITY
Start: 2022-10-31 | End: 2023-02-22

## 2023-02-22 RX ORDER — ATORVASTATIN CALCIUM 40 MG/1
80 TABLET, FILM COATED ORAL DAILY
COMMUNITY
Start: 2022-12-27 | End: 2023-03-27

## 2023-02-22 RX ORDER — METFORMIN HYDROCHLORIDE 500 MG/1
1000 TABLET, EXTENDED RELEASE ORAL
COMMUNITY
Start: 2022-12-27 | End: 2023-02-22

## 2023-02-23 ENCOUNTER — APPOINTMENT (OUTPATIENT)
Dept: ADMISSIONS | Facility: MEDICAL CENTER | Age: 68
End: 2023-02-23
Payer: COMMERCIAL

## 2023-02-23 LAB — EKG IMPRESSION: NORMAL

## 2023-02-23 PROCEDURE — 93010 ELECTROCARDIOGRAM REPORT: CPT | Performed by: INTERNAL MEDICINE

## 2023-02-24 ENCOUNTER — ANESTHESIA (OUTPATIENT)
Dept: SURGERY | Facility: MEDICAL CENTER | Age: 68
End: 2023-02-24
Payer: COMMERCIAL

## 2023-02-24 ENCOUNTER — HOSPITAL ENCOUNTER (OUTPATIENT)
Facility: MEDICAL CENTER | Age: 68
End: 2023-02-24
Attending: OPHTHALMOLOGY | Admitting: OPHTHALMOLOGY
Payer: COMMERCIAL

## 2023-02-24 ENCOUNTER — ANESTHESIA EVENT (OUTPATIENT)
Dept: SURGERY | Facility: MEDICAL CENTER | Age: 68
End: 2023-02-24
Payer: COMMERCIAL

## 2023-02-24 VITALS
SYSTOLIC BLOOD PRESSURE: 159 MMHG | WEIGHT: 191.8 LBS | BODY MASS INDEX: 30.1 KG/M2 | HEART RATE: 77 BPM | TEMPERATURE: 98 F | OXYGEN SATURATION: 94 % | RESPIRATION RATE: 18 BRPM | HEIGHT: 67 IN | DIASTOLIC BLOOD PRESSURE: 71 MMHG

## 2023-02-24 LAB — GLUCOSE BLD STRIP.AUTO-MCNC: 105 MG/DL (ref 65–99)

## 2023-02-24 PROCEDURE — 160029 HCHG SURGERY MINUTES - 1ST 30 MINS LEVEL 4: Performed by: OPHTHALMOLOGY

## 2023-02-24 PROCEDURE — 160025 RECOVERY II MINUTES (STATS): Performed by: OPHTHALMOLOGY

## 2023-02-24 PROCEDURE — 700111 HCHG RX REV CODE 636 W/ 250 OVERRIDE (IP): Performed by: OPHTHALMOLOGY

## 2023-02-24 PROCEDURE — 160036 HCHG PACU - EA ADDL 30 MINS PHASE I: Performed by: OPHTHALMOLOGY

## 2023-02-24 PROCEDURE — 00145 ANES PX EYE VITREORTNL SURG: CPT | Performed by: ANESTHESIOLOGY

## 2023-02-24 PROCEDURE — 160035 HCHG PACU - 1ST 60 MINS PHASE I: Performed by: OPHTHALMOLOGY

## 2023-02-24 PROCEDURE — 160009 HCHG ANES TIME/MIN: Performed by: OPHTHALMOLOGY

## 2023-02-24 PROCEDURE — 700101 HCHG RX REV CODE 250

## 2023-02-24 PROCEDURE — 700101 HCHG RX REV CODE 250: Performed by: ANESTHESIOLOGY

## 2023-02-24 PROCEDURE — 160041 HCHG SURGERY MINUTES - EA ADDL 1 MIN LEVEL 4: Performed by: OPHTHALMOLOGY

## 2023-02-24 PROCEDURE — 160047 HCHG PACU  - EA ADDL 30 MINS PHASE II: Performed by: OPHTHALMOLOGY

## 2023-02-24 PROCEDURE — 160002 HCHG RECOVERY MINUTES (STAT): Performed by: OPHTHALMOLOGY

## 2023-02-24 PROCEDURE — 160048 HCHG OR STATISTICAL LEVEL 1-5: Performed by: OPHTHALMOLOGY

## 2023-02-24 PROCEDURE — 82962 GLUCOSE BLOOD TEST: CPT

## 2023-02-24 PROCEDURE — 700111 HCHG RX REV CODE 636 W/ 250 OVERRIDE (IP): Performed by: ANESTHESIOLOGY

## 2023-02-24 PROCEDURE — 160046 HCHG PACU - 1ST 60 MINS PHASE II: Performed by: OPHTHALMOLOGY

## 2023-02-24 PROCEDURE — 700105 HCHG RX REV CODE 258: Performed by: OPHTHALMOLOGY

## 2023-02-24 PROCEDURE — 700101 HCHG RX REV CODE 250: Performed by: OPHTHALMOLOGY

## 2023-02-24 RX ORDER — MOXIFLOXACIN 5 MG/ML
SOLUTION/ DROPS OPHTHALMIC
Status: COMPLETED
Start: 2023-02-24 | End: 2023-02-24

## 2023-02-24 RX ORDER — BALANCED SALT SOLUTION 6.4; .75; .48; .3; 3.9; 1.7 MG/ML; MG/ML; MG/ML; MG/ML; MG/ML; MG/ML
SOLUTION OPHTHALMIC
Status: DISCONTINUED | OUTPATIENT
Start: 2023-02-24 | End: 2023-02-24 | Stop reason: HOSPADM

## 2023-02-24 RX ORDER — TRIAMCINOLONE ACETONIDE 40 MG/ML
INJECTION, SUSPENSION INTRA-ARTICULAR; INTRAMUSCULAR
Status: DISCONTINUED | OUTPATIENT
Start: 2023-02-24 | End: 2023-02-24 | Stop reason: HOSPADM

## 2023-02-24 RX ORDER — OXYCODONE HCL 5 MG/5 ML
10 SOLUTION, ORAL ORAL
Status: DISCONTINUED | OUTPATIENT
Start: 2023-02-24 | End: 2023-02-24 | Stop reason: HOSPADM

## 2023-02-24 RX ORDER — NEOMYCIN SULFATE, POLYMYXIN B SULFATE, AND DEXAMETHASONE 3.5; 10000; 1 MG/G; [USP'U]/G; MG/G
OINTMENT OPHTHALMIC
Status: DISCONTINUED | OUTPATIENT
Start: 2023-02-24 | End: 2023-02-24 | Stop reason: HOSPADM

## 2023-02-24 RX ORDER — FLURBIPROFEN SODIUM 0.3 MG/ML
SOLUTION/ DROPS OPHTHALMIC
Status: COMPLETED
Start: 2023-02-24 | End: 2023-02-24

## 2023-02-24 RX ORDER — BALANCED SALT SOLUTION ENRICHED WITH BICARBONATE, DEXTROSE, AND GLUTATHIONE
KIT INTRAOCULAR
Status: DISCONTINUED | OUTPATIENT
Start: 2023-02-24 | End: 2023-02-24 | Stop reason: HOSPADM

## 2023-02-24 RX ORDER — ONDANSETRON 2 MG/ML
4 INJECTION INTRAMUSCULAR; INTRAVENOUS
Status: DISCONTINUED | OUTPATIENT
Start: 2023-02-24 | End: 2023-02-24 | Stop reason: HOSPADM

## 2023-02-24 RX ORDER — METOCLOPRAMIDE HYDROCHLORIDE 5 MG/ML
INJECTION INTRAMUSCULAR; INTRAVENOUS PRN
Status: DISCONTINUED | OUTPATIENT
Start: 2023-02-24 | End: 2023-02-24 | Stop reason: SURG

## 2023-02-24 RX ORDER — LABETALOL HYDROCHLORIDE 5 MG/ML
5 INJECTION, SOLUTION INTRAVENOUS
Status: DISCONTINUED | OUTPATIENT
Start: 2023-02-24 | End: 2023-02-24 | Stop reason: HOSPADM

## 2023-02-24 RX ORDER — HYDROMORPHONE HYDROCHLORIDE 1 MG/ML
0.4 INJECTION, SOLUTION INTRAMUSCULAR; INTRAVENOUS; SUBCUTANEOUS
Status: DISCONTINUED | OUTPATIENT
Start: 2023-02-24 | End: 2023-02-24 | Stop reason: HOSPADM

## 2023-02-24 RX ORDER — EPHEDRINE SULFATE 50 MG/ML
5 INJECTION, SOLUTION INTRAVENOUS
Status: DISCONTINUED | OUTPATIENT
Start: 2023-02-24 | End: 2023-02-24 | Stop reason: HOSPADM

## 2023-02-24 RX ORDER — MEPERIDINE HYDROCHLORIDE 25 MG/ML
12.5 INJECTION INTRAMUSCULAR; INTRAVENOUS; SUBCUTANEOUS
Status: DISCONTINUED | OUTPATIENT
Start: 2023-02-24 | End: 2023-02-24 | Stop reason: HOSPADM

## 2023-02-24 RX ORDER — HALOPERIDOL 5 MG/ML
1 INJECTION INTRAMUSCULAR
Status: DISCONTINUED | OUTPATIENT
Start: 2023-02-24 | End: 2023-02-24 | Stop reason: HOSPADM

## 2023-02-24 RX ORDER — MIDAZOLAM HYDROCHLORIDE 1 MG/ML
1 INJECTION INTRAMUSCULAR; INTRAVENOUS
Status: DISCONTINUED | OUTPATIENT
Start: 2023-02-24 | End: 2023-02-24 | Stop reason: HOSPADM

## 2023-02-24 RX ORDER — BALANCED SALT SOLUTION ENRICHED WITH BICARBONATE, DEXTROSE, AND GLUTATHIONE
KIT INTRAOCULAR
Status: DISCONTINUED
Start: 2023-02-24 | End: 2023-02-24 | Stop reason: HOSPADM

## 2023-02-24 RX ORDER — PHENYLEPHRINE HYDROCHLORIDE 25 MG/ML
SOLUTION/ DROPS OPHTHALMIC
Status: COMPLETED
Start: 2023-02-24 | End: 2023-02-24

## 2023-02-24 RX ORDER — ATROPINE SULFATE 10 MG/ML
SOLUTION/ DROPS OPHTHALMIC
Status: DISCONTINUED
Start: 2023-02-24 | End: 2023-02-24 | Stop reason: HOSPADM

## 2023-02-24 RX ORDER — DIPHENHYDRAMINE HYDROCHLORIDE 50 MG/ML
12.5 INJECTION INTRAMUSCULAR; INTRAVENOUS
Status: DISCONTINUED | OUTPATIENT
Start: 2023-02-24 | End: 2023-02-24 | Stop reason: HOSPADM

## 2023-02-24 RX ORDER — SODIUM CHLORIDE, SODIUM LACTATE, POTASSIUM CHLORIDE, CALCIUM CHLORIDE 600; 310; 30; 20 MG/100ML; MG/100ML; MG/100ML; MG/100ML
INJECTION, SOLUTION INTRAVENOUS CONTINUOUS
Status: DISCONTINUED | OUTPATIENT
Start: 2023-02-24 | End: 2023-02-24 | Stop reason: HOSPADM

## 2023-02-24 RX ORDER — CYCLOPENTOLATE HYDROCHLORIDE 10 MG/ML
SOLUTION/ DROPS OPHTHALMIC
Status: COMPLETED
Start: 2023-02-24 | End: 2023-02-24

## 2023-02-24 RX ORDER — TROPICAMIDE 10 MG/ML
SOLUTION/ DROPS OPHTHALMIC
Status: COMPLETED
Start: 2023-02-24 | End: 2023-02-24

## 2023-02-24 RX ORDER — OXYCODONE HCL 5 MG/5 ML
5 SOLUTION, ORAL ORAL
Status: DISCONTINUED | OUTPATIENT
Start: 2023-02-24 | End: 2023-02-24 | Stop reason: HOSPADM

## 2023-02-24 RX ORDER — HYDROMORPHONE HYDROCHLORIDE 1 MG/ML
0.5 INJECTION, SOLUTION INTRAMUSCULAR; INTRAVENOUS; SUBCUTANEOUS
Status: DISCONTINUED | OUTPATIENT
Start: 2023-02-24 | End: 2023-02-24 | Stop reason: HOSPADM

## 2023-02-24 RX ORDER — LIDOCAINE HYDROCHLORIDE 20 MG/ML
INJECTION, SOLUTION EPIDURAL; INFILTRATION; INTRACAUDAL; PERINEURAL PRN
Status: DISCONTINUED | OUTPATIENT
Start: 2023-02-24 | End: 2023-02-24 | Stop reason: SURG

## 2023-02-24 RX ORDER — HYDROMORPHONE HYDROCHLORIDE 1 MG/ML
0.2 INJECTION, SOLUTION INTRAMUSCULAR; INTRAVENOUS; SUBCUTANEOUS
Status: DISCONTINUED | OUTPATIENT
Start: 2023-02-24 | End: 2023-02-24 | Stop reason: HOSPADM

## 2023-02-24 RX ORDER — HYDRALAZINE HYDROCHLORIDE 20 MG/ML
5 INJECTION INTRAMUSCULAR; INTRAVENOUS
Status: DISCONTINUED | OUTPATIENT
Start: 2023-02-24 | End: 2023-02-24 | Stop reason: HOSPADM

## 2023-02-24 RX ORDER — ONDANSETRON 2 MG/ML
INJECTION INTRAMUSCULAR; INTRAVENOUS PRN
Status: DISCONTINUED | OUTPATIENT
Start: 2023-02-24 | End: 2023-02-24 | Stop reason: SURG

## 2023-02-24 RX ORDER — CEFAZOLIN SODIUM 1 G/3ML
INJECTION, POWDER, FOR SOLUTION INTRAMUSCULAR; INTRAVENOUS
Status: DISCONTINUED | OUTPATIENT
Start: 2023-02-24 | End: 2023-02-24 | Stop reason: HOSPADM

## 2023-02-24 RX ADMIN — CYCLOPENTOLATE HYDROCHLORIDE 1 DROP: 10 SOLUTION OPHTHALMIC at 12:50

## 2023-02-24 RX ADMIN — TROPICAMIDE 1 DROP: 10 SOLUTION/ DROPS OPHTHALMIC at 12:48

## 2023-02-24 RX ADMIN — SODIUM CHLORIDE, POTASSIUM CHLORIDE, SODIUM LACTATE AND CALCIUM CHLORIDE: 600; 310; 30; 20 INJECTION, SOLUTION INTRAVENOUS at 14:20

## 2023-02-24 RX ADMIN — ONDANSETRON 4 MG: 2 INJECTION INTRAMUSCULAR; INTRAVENOUS at 14:50

## 2023-02-24 RX ADMIN — LIDOCAINE HYDROCHLORIDE 60 MG: 20 INJECTION, SOLUTION EPIDURAL; INFILTRATION; INTRACAUDAL at 14:28

## 2023-02-24 RX ADMIN — MOXIFLOXACIN 1 DROP: 5 SOLUTION/ DROPS OPHTHALMIC at 12:49

## 2023-02-24 RX ADMIN — FENTANYL CITRATE 25 MCG: 50 INJECTION, SOLUTION INTRAMUSCULAR; INTRAVENOUS at 14:46

## 2023-02-24 RX ADMIN — PHENYLEPHRINE HYDROCHLORIDE 1 DROP: 25 SOLUTION/ DROPS OPHTHALMIC at 12:48

## 2023-02-24 RX ADMIN — PROPOFOL 150 MG: 10 INJECTION, EMULSION INTRAVENOUS at 14:28

## 2023-02-24 RX ADMIN — FLURBIPROFEN SODIUM 1 DROP: 0.3 SOLUTION/ DROPS OPHTHALMIC at 12:49

## 2023-02-24 RX ADMIN — FENTANYL CITRATE 50 MCG: 50 INJECTION, SOLUTION INTRAMUSCULAR; INTRAVENOUS at 14:36

## 2023-02-24 RX ADMIN — METOCLOPRAMIDE 10 MG: 5 INJECTION, SOLUTION INTRAMUSCULAR; INTRAVENOUS at 14:35

## 2023-02-24 ASSESSMENT — FIBROSIS 4 INDEX: FIB4 SCORE: 1.8

## 2023-02-24 ASSESSMENT — PAIN DESCRIPTION - PAIN TYPE
TYPE: SURGICAL PAIN
TYPE: SURGICAL PAIN

## 2023-02-24 NOTE — OR NURSING
1530 Report received from Noel PUCKETT and care of patient assumed at this time. Tolerating po, denies pain or nausea at this time.     1553 Phone call to patient's nephew Logan, given status update. States he is in the lobby.   Brought to bedside.     1558 Handoff report to Noel PUCKETT.

## 2023-02-24 NOTE — ANESTHESIA PREPROCEDURE EVALUATION
Case: [id number] Date/Time: 02/24/23 1345    Procedure: VITRECTOMY, ENDO LASER, MEMBRANE PEEL, POSSIBLE GAS OR OIL LEFT EYE    Pre-op diagnosis: VITREOUS HEMORRHAGE LEFT EYE    Location: [facility] ROOM 24 / SURGERY SAME DAY [facility]    Surgeons: Deonte Miguel M.D.          Relevant Problems   CARDIAC   (positive) Coronary artery disease involving native coronary artery of native heart without angina pectoris   (positive) Essential hypertension   (positive) Hypertensive urgency         (positive) HALEY (acute kidney injury) (HCC)      ENDO   (positive) Type 2 diabetes mellitus with hyperglycemia, with long-term current use of insulin (HCC)     Ischemic cardiomyopathy with LV function of 35%,   S/p PCI and stent placement to the PDA, mid to distal LAD in August 2020  Hypertension  Hyperlipidemia  Mitral valve replacement in 2016    Denies any CP/SOB at this time  NPO >8h except meds    Physical Exam    Airway   Mallampati: II  TM distance: >3 FB  Neck ROM: full       Cardiovascular - normal exam  Rhythm: regular  Rate: normal  (-) murmur     Dental - normal exam           Pulmonary - normal exam  Breath sounds clear to auscultation     Abdominal    Neurological - normal exam                 Anesthesia Plan    ASA 4   ASA physical status 4 criteria: severe reduction of ejection fractions    Plan - general       Airway plan will be LMA          Induction: intravenous      Pertinent diagnostic labs and testing reviewed    Informed Consent:    Anesthetic plan and risks discussed with patient.

## 2023-02-24 NOTE — ANESTHESIA TIME REPORT
Anesthesia Start and Stop Event Times     Date Time Event    2/24/2023 1307 Ready for Procedure     1420 Anesthesia Start     1509 Anesthesia Stop        Responsible Staff  02/24/23    Name Role Begin End    Meli Culver M.D. Anesth 1420 1509        Overtime Reason:  overtime    Comments:

## 2023-02-24 NOTE — ANESTHESIA PROCEDURE NOTES
Airway    Date/Time: 2/24/2023 2:31 PM  Performed by: Meli Culver M.D.  Authorized by: Meli Culver M.D.     Location:  OR  Urgency:  Elective  Indications for Airway Management:  Anesthesia      Spontaneous Ventilation: absent    Sedation Level:  Deep  Preoxygenated: Yes    Final Airway Type:  Supraglottic airway  Final Supraglottic Airway:  Standard LMA    SGA Size:  4  Number of Attempts at Approach:  1

## 2023-02-24 NOTE — OR NURSING
1505 Pt to PACU from OR. Report from anesthesia and OR RN. On 6L O2 via mask. Respirations even and unlabored. VSS.      1530 Up and ambulate to bathroom with SBA. Upon returning to bed, oxygen saturation was 87%. Able to correctly return demonstrate incentive spirometer.     1545 Dr. Miguel at bedside to do post op evaluation.    1700 Discharge orders received. Meets discharge criteria at this time. No pain. No nausea. Tolerating PO. On RA. All lines and monitors discontinued. Reviewed discharge paperwork with keisha Ford. Discussed diet, activity, medications, follow up care and worsening symptoms. No questions at this time. Pt to be discharged to home via private vehicle.     1729 Pt escorted out of department in wheelchair with all belongings. Discharged home to responsible adult.

## 2023-02-24 NOTE — DISCHARGE INSTRUCTIONS
RETINAL DETACHMENT OR VITRECTOMY INSTRUCTIONS    These instructions are provided so that you can have the best chance for a successful recovery from your recent eye surgery. In general, they will remain in effect for at least two to three weeks following your operation.   Please call my office to see me within one week following your discharge from the hospital. You can make this appointment by calling my office. Call Monday through Friday from 8:00 am to 5:00 pm. For patients who live a great distance from my office, I may ask you to make arrangements with your local ophthalmologist for follow up care instead of returning here.     Eye Care:    Please keep eye patch on; keep clean and dry until follow up appointment. If a patch is worn at home, it should be changed at least once daily. These patches can usually be purchased at your local drug store.   If the eyelids become stuck together because of discharge, gently wash them with a clean damp washcloth moistened with warm tap water. It is advisable to wash under the eye with a damp washcloth at least once a day; be careful not to touch or press on the eye itself.     Appearance and sensation in the operated eye:    1.   It is normal for the operated eye to remain somewhat red, swollen and slightly irritated for four to six weeks.     2.   It is expected that there will be an increased amount of tearing and mattering in the operated eye.     Return of eyesight:     1.   You final best vision will not be known until the eye and retina are completely healed, which takes at least two to three months and sometimes much longer.     2.   Following this type of surgery, you may require a change in the prescription for your glasses or contact lenses. In general, checking for a new prescription is not done until at least two to three months following your surgery.     Physical Activities:    Things to Avoid:    1.   Aspirin  2.   Lifting or moving heavy objects (20 pounds  or more)  3.   Rubbing the operated eye.   4.   Strenuous or jarring physical exertion such as running, jumping, aerobics and swimming.   5.   Driving a car.  6.   Garden or yard work.  7.   Wearing of contact lenses in the operated eye for 4-6 weeks.  8.   Returning to work or school; depending upon the nature of eye surgery and occupation, I will advise you to refrain from returning to school or work for anywhere from 2-8 weeks.   9.   Air travel unless otherwise instructed.   10. Reading unless otherwise instructed.     Permissible Activities:    1.   Watching television and using your eyes in moderation.   2.   Cooking and light housework.   3.   Riding in a car on paved roads.  4.   Showering, bathing and shaving; however, avoid getting water in your eyes.     Indications for calling my office:     1.   Increased swelling or redness of the eyelids.  2.   Increased persistent pain in the eye which is not relieved by Tylenol or which does not go away within 24 hours.   3.   Decreased vision.         If any questions arise, call your provider.  If your provider is not available, please feel free to call the Surgical Center at [phone].    MEDICATIONS: Resume taking daily medication.  Take prescribed pain medication with food.  If no medication is prescribed, you may take non-aspirin pain medication if needed.  PAIN MEDICATION CAN BE VERY CONSTIPATING.  Take a stool softener or laxative such as senokot, pericolace, or milk of magnesia if needed.    What to Expect Post Anesthesia    Rest and take it easy for the first 24 hours.  A responsible adult is recommended to remain with you during that time.  It is normal to feel sleepy.  We encourage you to not do anything that requires balance, judgment or coordination.    FOR 24 HOURS DO NOT:  Drive, operate machinery or run household appliances.  Drink beer or alcoholic beverages.  Make important decisions or sign legal documents.    To avoid nausea, slowly advance  diet as tolerated, avoiding spicy or greasy foods for the first day.  Add more substantial food to your diet according to your provider's instructions.  Babies can be fed formula or breast milk as soon as they are hungry.  INCREASE FLUIDS AND FIBER TO AVOID CONSTIPATION.    MILD FLU-LIKE SYMPTOMS ARE NORMAL.  YOU MAY EXPERIENCE GENERALIZED MUSCLE ACHES, THROAT IRRITATION, HEADACHE AND/OR SOME NAUSEA.

## 2023-02-25 NOTE — ANESTHESIA POSTPROCEDURE EVALUATION
Patient: Chino Deluca    Procedure Summary     Date: 02/24/23 Room / Location: [facility] ROOM 24 / SURGERY SAME DAY [facility]    Anesthesia Start: 1420 Anesthesia Stop: 1509    Procedure: VITRECTOMY, ENDO LASER, LEFT EYE (Left: Eye) Diagnosis: (VITREOUS HEMORRHAGE LEFT EYE)    Surgeons: Deonte Miguel M.D. Responsible Provider: Meli Culver M.D.    Anesthesia Type: general ASA Status: 4          Final Anesthesia Type: general  Last vitals  BP   Blood Pressure : (!) 159/71    Temp   36.7 °C (98 °F)    Pulse   77   Resp   18    SpO2   94 %      Anesthesia Post Evaluation    Patient location during evaluation: PACU  Patient participation: complete - patient participated  Level of consciousness: awake and alert    Airway patency: patent  Anesthetic complications: no  Cardiovascular status: hemodynamically stable  Respiratory status: acceptable  Hydration status: euvolemic    PONV: none          No notable events documented.     Nurse Pain Score: 0 (NPRS)

## 2023-02-28 NOTE — OP REPORT
SURGEON:  Denote Miguel MD     ASSISTANT:  None.       PROCEDURES:  A 23-gauge pars plana vitrectomy, endolaser left eye     PREOPERATIVE DIAGNOSIS:  Vitreous hemorrhage left eye     POSTOPERATIVE DIAGNOSIS:  Same     ANESTHESIA:  General endotracheal.       FLUIDS:  See anesthesia note.       COMPLICATIONS:  None.       DETAILS OF THE PROCEDURE:  Correct eye was marked in the preoperative area.    Patient was taken to the operating room.  General anesthesia induced by anesthesiology and the patient was prepped and draped in the usual sterile ophthalmic fashion.  Site was marked 4 mm from the limbus in the inferotemporal quadrant.  A 23-gauge trocar was used in a beveled fashion to enter the vitreous cavity.  Infusion was placed in the eye, visualized with the light pipe and turned on.  One site superonasally and another site superotemporally were then marked 4 mm from the limbus.  A 23-gauge trocar was used in a beveled fashion to enter the vitreous cavity.  Light pipe and vitrector were inserted inside the eye. Core and peripheral vitrectomy was performed to vitreous hemorrhage. Trocars were then removed.  There was no leak. Postop Ancef and dexamethasone were injected subconjunctivally.  Drapes were then removed.  Patient's face was cleaned, ointment applied to the eye.  Eye was patched and shielded. Patient was taken to the recovery room in good condition.  There were no complications.

## 2023-05-23 ENCOUNTER — HOSPITAL ENCOUNTER (OUTPATIENT)
Dept: CARDIOLOGY | Facility: MEDICAL CENTER | Age: 68
End: 2023-05-23
Attending: INTERNAL MEDICINE
Payer: MEDICARE

## 2023-05-23 DIAGNOSIS — R06.09 DYSPNEA ON EXERTION: ICD-10-CM

## 2023-05-23 DIAGNOSIS — I50.20 ACC/AHA STAGE C SYSTOLIC HEART FAILURE (HCC): ICD-10-CM

## 2023-05-23 DIAGNOSIS — I50.9 HEART FAILURE, NYHA CLASS 2 (HCC): ICD-10-CM

## 2023-05-23 LAB
LV EJECT FRACT  99904: 40
LV EJECT FRACT MOD 2C 99903: 45.55
LV EJECT FRACT MOD 4C 99902: 33.68
LV EJECT FRACT MOD BP 99901: 42.25

## 2023-05-23 PROCEDURE — 93306 TTE W/DOPPLER COMPLETE: CPT | Mod: 26 | Performed by: INTERNAL MEDICINE

## 2023-05-23 PROCEDURE — 93306 TTE W/DOPPLER COMPLETE: CPT

## 2023-05-25 ENCOUNTER — APPOINTMENT (OUTPATIENT)
Dept: CARDIOLOGY | Facility: MEDICAL CENTER | Age: 68
End: 2023-05-25
Payer: COMMERCIAL

## 2023-05-30 ENCOUNTER — OFFICE VISIT (OUTPATIENT)
Dept: CARDIOLOGY | Facility: MEDICAL CENTER | Age: 68
End: 2023-05-30
Attending: INTERNAL MEDICINE
Payer: MEDICARE

## 2023-05-30 VITALS
HEART RATE: 70 BPM | RESPIRATION RATE: 16 BRPM | SYSTOLIC BLOOD PRESSURE: 150 MMHG | WEIGHT: 193 LBS | DIASTOLIC BLOOD PRESSURE: 90 MMHG | BODY MASS INDEX: 30.29 KG/M2 | OXYGEN SATURATION: 92 % | HEIGHT: 67 IN

## 2023-05-30 DIAGNOSIS — Z95.2 S/P MVR (MITRAL VALVE REPLACEMENT): ICD-10-CM

## 2023-05-30 DIAGNOSIS — I25.10 CORONARY ARTERY DISEASE INVOLVING NATIVE CORONARY ARTERY OF NATIVE HEART WITHOUT ANGINA PECTORIS: ICD-10-CM

## 2023-05-30 DIAGNOSIS — R06.09 DYSPNEA ON EXERTION: ICD-10-CM

## 2023-05-30 DIAGNOSIS — I50.20 ACC/AHA STAGE C SYSTOLIC HEART FAILURE (HCC): ICD-10-CM

## 2023-05-30 DIAGNOSIS — I50.9 HEART FAILURE, NYHA CLASS 2 (HCC): ICD-10-CM

## 2023-05-30 DIAGNOSIS — Z79.899 HIGH RISK MEDICATION USE: ICD-10-CM

## 2023-05-30 DIAGNOSIS — Z79.4 TYPE 2 DIABETES MELLITUS WITH HYPERGLYCEMIA, WITH LONG-TERM CURRENT USE OF INSULIN (HCC): ICD-10-CM

## 2023-05-30 DIAGNOSIS — I10 HTN (HYPERTENSION), MALIGNANT: ICD-10-CM

## 2023-05-30 DIAGNOSIS — Z95.5 STATUS POST INSERTION OF DRUG ELUTING CORONARY ARTERY STENT: ICD-10-CM

## 2023-05-30 DIAGNOSIS — E11.65 TYPE 2 DIABETES MELLITUS WITH HYPERGLYCEMIA, WITH LONG-TERM CURRENT USE OF INSULIN (HCC): ICD-10-CM

## 2023-05-30 DIAGNOSIS — I25.5 ISCHEMIC CARDIOMYOPATHY: ICD-10-CM

## 2023-05-30 DIAGNOSIS — E78.2 MIXED HYPERLIPIDEMIA: ICD-10-CM

## 2023-05-30 PROCEDURE — 99212 OFFICE O/P EST SF 10 MIN: CPT | Performed by: INTERNAL MEDICINE

## 2023-05-30 PROCEDURE — 3080F DIAST BP >= 90 MM HG: CPT | Performed by: INTERNAL MEDICINE

## 2023-05-30 PROCEDURE — 99215 OFFICE O/P EST HI 40 MIN: CPT | Performed by: INTERNAL MEDICINE

## 2023-05-30 PROCEDURE — 3077F SYST BP >= 140 MM HG: CPT | Performed by: INTERNAL MEDICINE

## 2023-05-30 RX ORDER — SPIRONOLACTONE 25 MG/1
25 TABLET ORAL DAILY
Qty: 30 TABLET | Refills: 11 | Status: SHIPPED | OUTPATIENT
Start: 2023-05-30

## 2023-05-30 RX ORDER — CARVEDILOL 6.25 MG/1
6.25 TABLET ORAL 2 TIMES DAILY WITH MEALS
Qty: 60 TABLET | Refills: 11 | Status: SHIPPED | OUTPATIENT
Start: 2023-05-30

## 2023-05-30 RX ORDER — ATORVASTATIN CALCIUM 20 MG/1
20 TABLET, FILM COATED ORAL DAILY
COMMUNITY
Start: 2023-05-22

## 2023-05-30 ASSESSMENT — ENCOUNTER SYMPTOMS
DEPRESSION: 0
SENSORY CHANGE: 0
MYALGIAS: 0
MEMORY LOSS: 0
DOUBLE VISION: 0
BLURRED VISION: 0
DIAPHORESIS: 0
FEVER: 0
HEADACHES: 0
PALPITATIONS: 0
SHORTNESS OF BREATH: 1
DIZZINESS: 0
ABDOMINAL PAIN: 0
FALLS: 0
COUGH: 0
BRUISES/BLEEDS EASILY: 0

## 2023-05-30 NOTE — PROGRESS NOTES
Chief Complaint   Patient presents with    Congestive Heart Failure     F/V DX: ACC/AHA stage C systolic heart failure (HCC)       Subjective   Chino Deluca is a 68 y.o. male who presents today for cardiac care and management due to ischemic cardiomyopathy with LV function of 35%, established coronary tear disease status post PCI and stent placement to the PDA, mid to distal LAD in August 2020, hypertension, hyperlipidemia, mitral valve replacement in 2016, along with diabetes.    Patient is feeling worse since last visit.  He reports of having more swelling in the legs and more shortness of breath especially with daily living activities.    He does have his recent transthoracic echocardiogram on May 23, 2023 which show LV function of 40%, no significant valvular disease.  I personally interpreted the images.    Past Medical History:   Diagnosis Date    Bowel habit changes     Consipation    Breath shortness     Patient reports in the past.  Pt. reports this resolved after heart surgery in 2016.    Cataract     OU-had surgery    Dental disorder     Diabetes (HCC)     Takes Insulin and oral medication    Full dentures     Glaucoma     OU Had Surgery    Heart valve disease     Pts. friend states, Heart Valve Replacement At [facility] 2016     HFrEF (heart failure with reduced ejection fraction) ([facility])     High cholesterol     Hypertension     Pneumonia     H/O 2016    Snoring      Past Surgical History:   Procedure Laterality Date    VITRECTOMY POSTERIOR Left 2/24/2023    Procedure: VITRECTOMY, ENDO LASER, LEFT EYE;  Surgeon: Deonte Miguel M.D.;  Location: SURGERY SAME DAY [facility];  Service: Ophthalmology    VITRECTOMY POSTERIOR Right 8/23/2022    Procedure: VITRECTOMY ENDOLASER, MEMBRANE PEEL, POSSIBLE GAS OR OIL RIGHT EYE;  Surgeon: Deonte Miguel M.D.;  Location: SURGERY SAME DAY [facility];  Service: Ophthalmology    OTHER Right 2021    R arm surgery    VITRECTOMY POSTERIOR Left 06/09/2020     Procedure: VITRECTOMY, POSTERIOR PORTION- MAMBRANE PEEL POSS LASER GAS OR OIL;  Surgeon: Deonte Miguel M.D.;  Location: SURGERY SAME DAY [facility];  Service: Ophthalmology    OTHER      cataract bilateral    OTHER      Surgery For Glaucoma OU     OTHER CARDIAC SURGERY      Per pts. friend Heart valve replacement 2016 [facility]     Family History   Problem Relation Age of Onset    No Known Problems Mother     Diabetes Father      Social History     Socioeconomic History    Marital status:      Spouse name: Not on file    Number of children: Not on file    Years of education: Not on file    Highest education level: Not on file   Occupational History    Not on file   Tobacco Use    Smoking status: Former     Packs/day: 0.25     Years: 15.00     Pack years: 3.75     Types: Cigarettes     Quit date:      Years since quittin.4    Smokeless tobacco: Never   Vaping Use    Vaping Use: Never used   Substance and Sexual Activity    Alcohol use: No    Drug use: No    Sexual activity: Yes     Partners: Female   Other Topics Concern    Not on file   Social History Narrative    Not on file     Social Determinants of Health     Financial Resource Strain: Medium Risk (2021)    Overall Financial Resource Strain (CARDIA)     Difficulty of Paying Living Expenses: Somewhat hard   Food Insecurity: Food Insecurity Present (2021)    Hunger Vital Sign     Worried About Running Out of Food in the Last Year: Sometimes true     Ran Out of Food in the Last Year: Never true   Transportation Needs: Unmet Transportation Needs (2021)    PRAPARE - Transportation     Lack of Transportation (Medical): Yes     Lack of Transportation (Non-Medical): Yes   Physical Activity: Not on file   Stress: Not on file   Social Connections: Not on file   Intimate Partner Violence: Not on file   Housing Stability: Not on file     No Known Allergies  Outpatient Encounter Medications as of 2023   Medication Sig Dispense Refill     atorvastatin (LIPITOR) 20 MG Tab Take 20 mg by mouth every day.      spironolactone (ALDACTONE) 25 MG Tab Take 1 Tablet by mouth every day. 30 Tablet 11    sacubitril-valsartan (ENTRESTO) 49-51 MG Tab Take 1 Tablet by mouth 2 times a day. 60 Tablet 11    carvedilol (COREG) 6.25 MG Tab Take 1 Tablet by mouth 2 times a day with meals. 60 Tablet 11    metFORMIN ER (GLUCOPHAGE XR) 500 MG TABLET SR 24 HR Take 500 mg by mouth every evening. Take 2 tablets (total of 1000mg) daily with dinner.      insulin 70/30 (HUMULIN 70/30/NOVOLIN 70/30) (70-30) 100 UNIT/ML Suspension Inject 30 Units under the skin.      dapagliflozin propanediol (FARXIGA) 10 MG Tab Take 1 Tablet by mouth every day. 90 Tablet 3    vitamin D (CHOLECALCIFEROL) 1000 Unit (25 mcg) Tab Take 1,000 Units by mouth every morning.      aspirin EC 81 MG EC tablet Take 1 Tab by mouth every day. (Patient taking differently: Take 81 mg by mouth every evening.) 30 Tab 11    dapsone 100 MG Tab Take 100 mg by mouth every day. (Patient not taking: Reported on 5/30/2023)      [DISCONTINUED] olmesartan (BENICAR) 20 MG Tab Take 1 Tablet by mouth every day. (Patient taking differently: Take 20 mg by mouth every evening.) 30 Tablet 11    [DISCONTINUED] spironolactone (ALDACTONE) 25 MG Tab Take 1 Tablet by mouth every day. (Patient taking differently: Take 12.5 mg by mouth every evening.) 30 Tablet 11     No facility-administered encounter medications on file as of 5/30/2023.     Review of Systems   Constitutional:  Negative for diaphoresis and fever.   HENT:  Negative for nosebleeds.    Eyes:  Negative for blurred vision and double vision.   Respiratory:  Positive for shortness of breath. Negative for cough.    Cardiovascular:  Positive for leg swelling. Negative for chest pain and palpitations.   Gastrointestinal:  Negative for abdominal pain.   Genitourinary:  Negative for dysuria and frequency.   Musculoskeletal:  Negative for falls and myalgias.   Skin:  Negative for  "rash.   Neurological:  Negative for dizziness, sensory change and headaches.   Endo/Heme/Allergies:  Does not bruise/bleed easily.   Psychiatric/Behavioral:  Negative for depression and memory loss.               Objective     BP (!) 150/90 (BP Location: Left arm, Patient Position: Sitting, BP Cuff Size: Adult)   Pulse 70   Resp 16   Ht 1.702 m (5' 7\")   Wt 87.5 kg (193 lb)   SpO2 92%   BMI 30.23 kg/m²     Physical Exam  Vitals and nursing note reviewed.   Constitutional:       General: He is not in acute distress.     Appearance: He is not diaphoretic.   HENT:      Head: Normocephalic and atraumatic.      Right Ear: External ear normal.      Left Ear: External ear normal.      Nose: No congestion or rhinorrhea.   Eyes:      General:         Right eye: No discharge.         Left eye: No discharge.   Neck:      Thyroid: No thyromegaly.      Vascular: No JVD.   Cardiovascular:      Rate and Rhythm: Normal rate and regular rhythm.      Pulses: Normal pulses.   Pulmonary:      Effort: No respiratory distress.   Abdominal:      General: There is no distension.      Tenderness: There is no abdominal tenderness.   Musculoskeletal:         General: No swelling or tenderness.      Right lower leg: No edema.      Left lower leg: No edema.   Skin:     General: Skin is warm and dry.   Neurological:      Mental Status: He is alert and oriented to person, place, and time.      Cranial Nerves: No cranial nerve deficit.   Psychiatric:         Behavior: Behavior normal.                Assessment & Plan     1. Heart failure, NYHA class 2 (HCC)  spironolactone (ALDACTONE) 25 MG Tab    sacubitril-valsartan (ENTRESTO) 49-51 MG Tab    carvedilol (COREG) 6.25 MG Tab    Basic Metabolic Panel    LIPID PANEL    Lipoprotein (a)    EC-ECHOCARDIOGRAM COMPLETE W/O CONT      2. ACC/AHA stage C systolic heart failure (HCC)  spironolactone (ALDACTONE) 25 MG Tab    sacubitril-valsartan (ENTRESTO) 49-51 MG Tab    carvedilol (COREG) 6.25 MG Tab "    Basic Metabolic Panel    LIPID PANEL    Lipoprotein (a)    EC-ECHOCARDIOGRAM COMPLETE W/O CONT      3. Ischemic cardiomyopathy  Basic Metabolic Panel    LIPID PANEL    Lipoprotein (a)    EC-ECHOCARDIOGRAM COMPLETE W/O CONT      4. Dyspnea on exertion  proBrain Natriuretic Peptide, NT    EC-ECHOCARDIOGRAM COMPLETE W/O CONT      5. Coronary artery disease involving native coronary artery of native heart without angina pectoris        6. Status post insertion of drug eluting coronary artery stent        7. S/P MVR (mitral valve replacement)        8. Mixed hyperlipidemia        9. HTN (hypertension), malignant        10. Type 2 diabetes mellitus with hyperglycemia, with long-term current use of insulin (HCC)        11. High risk medication use            Medical Decision Making: Today's Assessment/Status/Plan:   Ischemic Cardiomyopathy:  Chronically illed condition which requires ongoing close monitoring and treatment to improve survival rate along with decreasing risk of clinical decompensation sudden cardiac death and hospitalization.    Today, based on physical examination findings, patient is euvolemic. No JVD, lungs are clear to auscultation, no pitting edema in bilateral lower extremities, no ascites.     Dry weight is 193 lbs.     At this time, I will stop Benicar and start Entresto 100 mg p.o. twice a day.    I will start patient on carvedilol 6.25 mg p.o. twice a day.  His Toprol-XL was stopped for unknown reason.     Aldactone antagonist with Spironolactone will be increased to 25 mg daily.    Based on recent data on SGLT2 and heart failure with reduced ejection fraction, patient will be benefited from Farxiga 10 mg p.o. once a day for further reduction in mortality and hospitalization with absolute risk reduction of 4.9%.  Therefore, I will continue patient on Farxiga 10 mg p.o. once a day.  Risks and benefits were explained to patient and patient has agreed to proceed.    NO ICD indication due to  improved LVEF now more than 35%.    Coronary arterial disease:  Continue asa, BB, ARNI and statin at current dose.    Hypertension:  Optimize control using cardiomyopathy medical regimen as well.    Hyperlipidemia:  Optimize statin as within guidelines of CAD treatment as above.       Of note, during the care of this patient, I spent a significant amount of time explaining the nature of the disease process, reviewing all possible imaging studies, blood test results to patient.  The overall care of this patient require a higher level of care than usual due to the multiple comorbidities along with ongoing issues of congestive heart failure that put this patient at risk for sudden cardiac death, increased mortality, and hospitalization.  This patient also requires close monitoring with at least monthly blood test to monitor renal function and electrolytes due to the ongoing dynamic changes of medical therapy titration protocol to ensure optimal benefits for the overall care of this patient.    Pam Bernstein M.D.

## 2023-05-30 NOTE — PATIENT INSTRUCTIONS
Will stop Benicar.    Will add Entresto 49/51 mg twice a day.    Will increase Spironolactone to 25 mg daily.

## 2023-06-20 ENCOUNTER — TELEPHONE (OUTPATIENT)
Dept: CARDIOLOGY | Facility: MEDICAL CENTER | Age: 68
End: 2023-06-20
Payer: MEDICARE

## 2023-06-20 NOTE — TELEPHONE ENCOUNTER
Called pt in regards to lab work that was ordered at previous OV. Patient's nephew states he will let uncle know and have him call back if lab work was already done or have him get it done before appointment. Pt has follow up appointment scheduled with AM on 06/27/23.

## 2023-06-22 ENCOUNTER — HOSPITAL ENCOUNTER (OUTPATIENT)
Dept: LAB | Facility: MEDICAL CENTER | Age: 68
End: 2023-06-22
Attending: INTERNAL MEDICINE
Payer: MEDICARE

## 2023-06-22 DIAGNOSIS — I50.9 HEART FAILURE, NYHA CLASS 2 (HCC): ICD-10-CM

## 2023-06-22 DIAGNOSIS — I50.20 ACC/AHA STAGE C SYSTOLIC HEART FAILURE (HCC): ICD-10-CM

## 2023-06-22 DIAGNOSIS — I25.5 ISCHEMIC CARDIOMYOPATHY: ICD-10-CM

## 2023-06-22 DIAGNOSIS — R06.09 DYSPNEA ON EXERTION: ICD-10-CM

## 2023-06-22 LAB
ANION GAP SERPL CALC-SCNC: 10 MMOL/L (ref 7–16)
BUN SERPL-MCNC: 16 MG/DL (ref 8–22)
CALCIUM SERPL-MCNC: 9.4 MG/DL (ref 8.5–10.5)
CHLORIDE SERPL-SCNC: 104 MMOL/L (ref 96–112)
CHOLEST SERPL-MCNC: 107 MG/DL (ref 100–199)
CO2 SERPL-SCNC: 26 MMOL/L (ref 20–33)
CREAT SERPL-MCNC: 0.89 MG/DL (ref 0.5–1.4)
GFR SERPLBLD CREATININE-BSD FMLA CKD-EPI: 93 ML/MIN/1.73 M 2
GLUCOSE SERPL-MCNC: 177 MG/DL (ref 65–99)
HDLC SERPL-MCNC: 35 MG/DL
LDLC SERPL CALC-MCNC: 47 MG/DL
NT-PROBNP SERPL IA-MCNC: 950 PG/ML (ref 0–125)
POTASSIUM SERPL-SCNC: 5.2 MMOL/L (ref 3.6–5.5)
SODIUM SERPL-SCNC: 140 MMOL/L (ref 135–145)
TRIGL SERPL-MCNC: 125 MG/DL (ref 0–149)

## 2023-06-22 PROCEDURE — 80048 BASIC METABOLIC PNL TOTAL CA: CPT

## 2023-06-22 PROCEDURE — 36415 COLL VENOUS BLD VENIPUNCTURE: CPT

## 2023-06-22 PROCEDURE — 80061 LIPID PANEL: CPT

## 2023-06-22 PROCEDURE — 83880 ASSAY OF NATRIURETIC PEPTIDE: CPT

## 2023-06-27 ENCOUNTER — APPOINTMENT (OUTPATIENT)
Dept: CARDIOLOGY | Facility: MEDICAL CENTER | Age: 68
End: 2023-06-27
Attending: PHYSICIAN ASSISTANT
Payer: MEDICARE

## 2023-07-27 ENCOUNTER — OFFICE VISIT (OUTPATIENT)
Dept: CARDIOLOGY | Facility: MEDICAL CENTER | Age: 68
End: 2023-07-27
Attending: PHYSICIAN ASSISTANT
Payer: MEDICARE

## 2023-07-27 VITALS
RESPIRATION RATE: 16 BRPM | HEART RATE: 67 BPM | BODY MASS INDEX: 30.29 KG/M2 | HEIGHT: 67 IN | WEIGHT: 193 LBS | OXYGEN SATURATION: 93 % | SYSTOLIC BLOOD PRESSURE: 118 MMHG | DIASTOLIC BLOOD PRESSURE: 76 MMHG

## 2023-07-27 DIAGNOSIS — Z79.899 HIGH RISK MEDICATION USE: ICD-10-CM

## 2023-07-27 DIAGNOSIS — I50.9 HEART FAILURE, NYHA CLASS 2 (HCC): ICD-10-CM

## 2023-07-27 DIAGNOSIS — Z79.4 TYPE 2 DIABETES MELLITUS WITH HYPERGLYCEMIA, WITH LONG-TERM CURRENT USE OF INSULIN (HCC): ICD-10-CM

## 2023-07-27 DIAGNOSIS — Z95.2 S/P MVR (MITRAL VALVE REPLACEMENT): ICD-10-CM

## 2023-07-27 DIAGNOSIS — I25.10 CORONARY ARTERY DISEASE INVOLVING NATIVE CORONARY ARTERY OF NATIVE HEART WITHOUT ANGINA PECTORIS: ICD-10-CM

## 2023-07-27 DIAGNOSIS — E11.65 TYPE 2 DIABETES MELLITUS WITH HYPERGLYCEMIA, WITH LONG-TERM CURRENT USE OF INSULIN (HCC): ICD-10-CM

## 2023-07-27 DIAGNOSIS — I10 HTN (HYPERTENSION), MALIGNANT: ICD-10-CM

## 2023-07-27 DIAGNOSIS — I25.5 ISCHEMIC CARDIOMYOPATHY: ICD-10-CM

## 2023-07-27 DIAGNOSIS — E78.2 MIXED HYPERLIPIDEMIA: ICD-10-CM

## 2023-07-27 DIAGNOSIS — I50.20 ACC/AHA STAGE C SYSTOLIC HEART FAILURE (HCC): ICD-10-CM

## 2023-07-27 DIAGNOSIS — Z95.5 STATUS POST INSERTION OF DRUG ELUTING CORONARY ARTERY STENT: ICD-10-CM

## 2023-07-27 PROCEDURE — 99214 OFFICE O/P EST MOD 30 MIN: CPT | Performed by: PHYSICIAN ASSISTANT

## 2023-07-27 PROCEDURE — 99212 OFFICE O/P EST SF 10 MIN: CPT | Performed by: PHYSICIAN ASSISTANT

## 2023-07-27 PROCEDURE — 3074F SYST BP LT 130 MM HG: CPT | Performed by: PHYSICIAN ASSISTANT

## 2023-07-27 PROCEDURE — 3078F DIAST BP <80 MM HG: CPT | Performed by: PHYSICIAN ASSISTANT

## 2023-07-27 RX ORDER — DAPAGLIFLOZIN 10 MG/1
10 TABLET, FILM COATED ORAL DAILY
Qty: 90 TABLET | Refills: 3 | Status: SHIPPED | OUTPATIENT
Start: 2023-07-27

## 2023-07-27 ASSESSMENT — ENCOUNTER SYMPTOMS
FEVER: 0
EYES NEGATIVE: 1
WEAKNESS: 0
ABDOMINAL PAIN: 0
BLURRED VISION: 0
HEADACHES: 0
DOUBLE VISION: 0
PSYCHIATRIC NEGATIVE: 1
ORTHOPNEA: 0
MUSCULOSKELETAL NEGATIVE: 1
MYALGIAS: 0
CHILLS: 0
PND: 0
DIZZINESS: 1
SHORTNESS OF BREATH: 0
COUGH: 0
BRUISES/BLEEDS EASILY: 0
VOMITING: 0
NAUSEA: 0
PALPITATIONS: 0
CLAUDICATION: 0
GASTROINTESTINAL NEGATIVE: 1
LOSS OF CONSCIOUSNESS: 0

## 2023-07-27 NOTE — PROGRESS NOTES
Chief Complaint   Patient presents with    Congestive Heart Failure     F/V DX: Heart failure, NYHA class 2 ([facility])       Subjective     Chino Deluca is a 68 y.o. male with a history of ischemic cardiomyopathy, HFrEF, CAD s/p PCI CARLOS to the PDA, mid to distal LAD, hypertension, hyperlipidemia, mitral valve replacement 2016, and diabetes who presents today for follow up.     His primary cardiologist is Dr. Bernstein. Last seen 5/30/2023. At that exam, he was feeling worse since his last visit. He was having more swelling in his legs and shortness of breath. An echocardiogram had been completed which demonstrated an LVEF of 40%. He was started on entresto and resumed on carvedilol 6.25mg daily    An interpretor was used for today's visit. Today, he reports he is mostly doing well. He does have some fatigue and occasional dizziness. No chest pain or palpitations. No shortness of breath, dyspnea on exertion, orthopnea or PND. No lower extremity edema. No dizziness or lightheadedness. No syncope or presyncope.      Past Medical History:   Diagnosis Date    Bowel habit changes     Consipation    Breath shortness     Patient reports in the past.  Pt. reports this resolved after heart surgery in 2016.    Cataract     OU-had surgery    Dental disorder     Diabetes ([facility])     Takes Insulin and oral medication    Full dentures     Glaucoma     OU Had Surgery    Heart valve disease     Pts. friend states, Heart Valve Replacement At [facility] 2016     HFrEF (heart failure with reduced ejection fraction) ([facility])     High cholesterol     Hypertension     Pneumonia     H/O 2016    Snoring      Past Surgical History:   Procedure Laterality Date    VITRECTOMY POSTERIOR Left 2/24/2023    Procedure: VITRECTOMY, ENDO LASER, LEFT EYE;  Surgeon: Deonte Miguel M.D.;  Location: SURGERY SAME DAY [facility];  Service: Ophthalmology    VITRECTOMY POSTERIOR Right 8/23/2022    Procedure: VITRECTOMY ENDOLASER, MEMBRANE PEEL, POSSIBLE GAS OR OIL  RIGHT EYE;  Surgeon: Deonte Miguel M.D.;  Location: SURGERY SAME DAY [facility];  Service: Ophthalmology    OTHER Right     R arm surgery    VITRECTOMY POSTERIOR Left 2020    Procedure: VITRECTOMY, POSTERIOR PORTION- MAMBRANE PEEL POSS LASER GAS OR OIL;  Surgeon: Deonte Miguel M.D.;  Location: SURGERY SAME DAY [facility];  Service: Ophthalmology    OTHER      cataract bilateral    OTHER      Surgery For Glaucoma OU     OTHER CARDIAC SURGERY      Per pts. friend Heart valve replacement  [facility]     Family History   Problem Relation Age of Onset    No Known Problems Mother     Diabetes Father      Social History     Socioeconomic History    Marital status:      Spouse name: Not on file    Number of children: Not on file    Years of education: Not on file    Highest education level: Not on file   Occupational History    Not on file   Tobacco Use    Smoking status: Former     Packs/day: 0.25     Years: 15.00     Pack years: 3.75     Types: Cigarettes     Quit date:      Years since quittin.6    Smokeless tobacco: Never   Vaping Use    Vaping Use: Never used   Substance and Sexual Activity    Alcohol use: No    Drug use: No    Sexual activity: Yes     Partners: Female   Other Topics Concern    Not on file   Social History Narrative    Not on file     Social Determinants of Health     Financial Resource Strain: Medium Risk (2021)    Overall Financial Resource Strain (CARDIA)     Difficulty of Paying Living Expenses: Somewhat hard   Food Insecurity: Food Insecurity Present (2021)    Hunger Vital Sign     Worried About Running Out of Food in the Last Year: Sometimes true     Ran Out of Food in the Last Year: Never true   Transportation Needs: Unmet Transportation Needs (2021)    PRAPARE - Transportation     Lack of Transportation (Medical): Yes     Lack of Transportation (Non-Medical): Yes   Physical Activity: Not on file   Stress: Not on file   Social Connections: Not on file    Intimate Partner Violence: Not on file   Housing Stability: Not on file     No Known Allergies  Outpatient Encounter Medications as of 7/27/2023   Medication Sig Dispense Refill    dapagliflozin propanediol (FARXIGA) 10 MG Tab Take 1 Tablet by mouth every day. 90 Tablet 3    atorvastatin (LIPITOR) 20 MG Tab Take 20 mg by mouth every day.      spironolactone (ALDACTONE) 25 MG Tab Take 1 Tablet by mouth every day. 30 Tablet 11    sacubitril-valsartan (ENTRESTO) 49-51 MG Tab Take 1 Tablet by mouth 2 times a day. 60 Tablet 11    carvedilol (COREG) 6.25 MG Tab Take 1 Tablet by mouth 2 times a day with meals. 60 Tablet 11    metFORMIN ER (GLUCOPHAGE XR) 500 MG TABLET SR 24 HR Take 500 mg by mouth every evening. Take 2 tablets (total of 1000mg) daily with dinner.      insulin 70/30 (HUMULIN 70/30/NOVOLIN 70/30) (70-30) 100 UNIT/ML Suspension Inject 30 Units under the skin.      vitamin D (CHOLECALCIFEROL) 1000 Unit (25 mcg) Tab Take 1,000 Units by mouth every morning.      aspirin EC 81 MG EC tablet Take 1 Tab by mouth every day. (Patient taking differently: Take 81 mg by mouth every evening.) 30 Tab 11    [DISCONTINUED] dapagliflozin propanediol (FARXIGA) 10 MG Tab Take 1 Tablet by mouth every day. 90 Tablet 3     No facility-administered encounter medications on file as of 7/27/2023.     Review of Systems   Constitutional:  Positive for malaise/fatigue. Negative for chills and fever.   HENT: Negative.     Eyes: Negative.  Negative for blurred vision and double vision.   Respiratory:  Negative for cough and shortness of breath.    Cardiovascular:  Negative for chest pain, palpitations, orthopnea, claudication, leg swelling and PND.   Gastrointestinal: Negative.  Negative for abdominal pain, nausea and vomiting.   Genitourinary: Negative.    Musculoskeletal: Negative.  Negative for myalgias.   Skin: Negative.  Negative for rash.   Neurological:  Positive for dizziness. Negative for loss of consciousness, weakness and  "headaches.   Endo/Heme/Allergies: Negative.  Does not bruise/bleed easily.   Psychiatric/Behavioral: Negative.                Objective     /76 (BP Location: Left arm, Patient Position: Sitting, BP Cuff Size: Adult)   Pulse 67   Resp 16   Ht 1.702 m (5' 7\")   Wt 87.5 kg (193 lb)   SpO2 93%   BMI 30.23 kg/m²     Physical Exam  Vitals reviewed.   Constitutional:       General: He is not in acute distress.     Appearance: Normal appearance.   HENT:      Head: Normocephalic and atraumatic.      Right Ear: External ear normal.      Left Ear: External ear normal.   Eyes:      General: No scleral icterus.     Extraocular Movements: Extraocular movements intact.      Conjunctiva/sclera: Conjunctivae normal.      Pupils: Pupils are equal, round, and reactive to light.   Cardiovascular:      Rate and Rhythm: Normal rate and regular rhythm.      Pulses: Normal pulses.      Heart sounds: Normal heart sounds. No murmur heard.     No friction rub. No gallop.   Pulmonary:      Effort: Pulmonary effort is normal.      Breath sounds: Normal breath sounds.   Abdominal:      General: Bowel sounds are normal.      Palpations: Abdomen is soft.      Tenderness: There is no abdominal tenderness.   Musculoskeletal:         General: Normal range of motion.      Cervical back: Normal range of motion and neck supple.      Right lower leg: No edema.      Left lower leg: No edema.   Skin:     General: Skin is warm and dry.      Capillary Refill: Capillary refill takes less than 2 seconds.   Neurological:      General: No focal deficit present.      Mental Status: He is alert and oriented to person, place, and time.   Psychiatric:         Mood and Affect: Mood normal.         Behavior: Behavior normal.         Judgment: Judgment normal.       Lab Results   Component Value Date/Time    CHOLSTRLTOT 107 06/22/2023 09:29 AM    LDL 47 06/22/2023 09:29 AM    HDL 35 (A) 06/22/2023 09:29 AM    TRIGLYCERIDE 125 06/22/2023 09:29 AM       Lab " Results   Component Value Date/Time    SODIUM 140 06/22/2023 09:29 AM    POTASSIUM 5.2 06/22/2023 09:29 AM    CHLORIDE 104 06/22/2023 09:29 AM    CO2 26 06/22/2023 09:29 AM    GLUCOSE 177 (H) 06/22/2023 09:29 AM    BUN 16 06/22/2023 09:29 AM    CREATININE 0.89 06/22/2023 09:29 AM     Lab Results   Component Value Date/Time    ALKPHOSPHAT 132 (H) 05/14/2021 05:51 AM    ASTSGOT 36 05/14/2021 05:51 AM    ALTSGPT 50 05/14/2021 05:51 AM    TBILIRUBIN 1.2 05/14/2021 05:51 AM       Cardiovascular imaging and procedures:    Echocardiogram 8/13/2020  CONCLUSIONS  No prior study is available for comparison.   Contrast was used to enhance visualization of the endocardial border.  Moderate concentric left ventricular hypertrophy.  Left ventricular ejection fraction is visually estimated to be 25%.  Akinesis of the mid to distal inferior wall, distal anterior wall , and   mid to distal septum.  Vulcan is mildly dyskinetic without thrombus.  A reliable estimation of diastolic function cannot be made due to   mitral valve disease.  Known mitral valve replacement which is functioning normally with   appropriate transvalvular gradient.  Mean transvalvular gradient is 4  mmHg at a heart rate of 83 BPM.  Mild aortic insufficiency.  Aortic sclerosis without stenosis.  Unable to estimate pulmonary artery pressure due to an inadequate   tricuspid regurgitant jet.  A reliable estimation of diastolic function cannot be made due to   mitral valve disease.  Normal pericardium without effusion.  Results via [employer] text to ordering physician at 08:42 hrs     Echocardiogram 5/14/2021  CONCLUSIONS  Prior echo 8/13/20, the EF has mildly improved.  Left ventricular ejection fraction is visually estimated to be 35%.  Mild aortic stenosis.  Unable to estimate pulmonary artery pressure due to an inadequate   tricuspid regurgitant jet.    Echocardiogram 5/23/2023  CONCLUSIONS  Compared to the prior study on 5/14/2021, there is slight improvement   in  left ventricular systolic function.  Moderate concentric left ventricular hypertrophy. Moderately reduced   left ventricular systolic function. The left ventricular ejection   fraction is visually estimated to be 40%. Global hypokinesis with   regional variation.   Known mitral valve bioprosthesis with appropriate transvalvular   gradient. Mean transvalvular gradient is 5.30 mmHg at a heart rate of   67 BPM. There appears to be a mobile calcified structure on what would   be the anterior leaflet which was seen in prior study.  Normal aortic root for body surface area. The ascending aorta diameter   is 3.1 cm.         Assessment & Plan     1. ACC/AHA stage C systolic heart failure (HCC)  Basic Metabolic Panel    dapagliflozin propanediol (FARXIGA) 10 MG Tab      2. Heart failure, NYHA class 2 (HCC)  Basic Metabolic Panel    dapagliflozin propanediol (FARXIGA) 10 MG Tab      3. Ischemic cardiomyopathy        4. High risk medication use  Basic Metabolic Panel      5. Coronary artery disease involving native coronary artery of native heart without angina pectoris        6. Status post insertion of drug eluting coronary artery stent        7. HTN (hypertension), malignant        8. Mixed hyperlipidemia        9. S/P MVR (mitral valve replacement)        10. Type 2 diabetes mellitus with hyperglycemia, with long-term current use of insulin ([facility])  dapagliflozin propanediol (FARXIGA) 10 MG Tab          Medical Decision Making: Today's Assessment/Status/Plan:          Ischemic cardiomyopathy  HFrEF, Stage C, Class II, LVEF 40%:   -Discussed Heart failure trajectory and prognosis with patient. Will continue to optimize medical therapy as tolerated. Advanced HF treatment, need for remote monitoring consideration at every visit.   -ACE-I/ARB/ARNI: Continue entresto 49-51 BID  -Evidence Based Beta-blocker: Continue carvedilol 6.25mg BID  -Aldosterone Antagonist: Continue spironolactone. If K+ is as the upper limit of normal on  repeat labs, consider reducing.  -Diuretic: None  -SGLT2 inhibitor: Continue spironolactone 25mg daily  -Labs: Repeat BMP. Will continue to closely monitor for side effects of patient's high risk medication(s) including renal function, liver function NTproBNP/cardiac markers, and electrolytes as needed  -discussed importance of exercise and regular activity  -Discussed/reviewed maintaining a low Sodium and hydration recommendations  -ICD not currently indicated given LVEF >35%.   -Reinforced s/sx of worsening heart failure with patient and weight monitoring. Pt verbalizes understanding. Pt to call office or RTC if present.    -PUMP line number [phone] (PUMP) reviewed with patient  -Heart Failure Education:   Discussed the Definition of heart failure (linking disease, symptoms, and treatment) and causes of heart failure  Recognition of escalating symptoms and concrete plan for response to particular symptoms  Discussed the indication for ACE-I/ARB/ARNI, Beta-Blocker, Aldosterone antagonist, beta-blocker, SGLT2 inhibitor  Risk modification for heart failure progression  Specific diet recommendations: individualized low-sodium diet, recommendation for alcohol intake, etc.  Specific activity and exercise recommendations  Importance of treatment adherence  Behavioral strategies to promote treatment adherence      CAD   s/p PCI CARLOS to the PDA and mid to distal LAD   - Asymptomatic  - Continue aspirin and atorvastatin  - Continue GDMT per above    Hypertension  - Well controlled  - Continue antihypertensives per above.    Hyperlipidemia  - LDL 47; at goal of <70  - Continue atorvastatin 20mg daily    Mitral valve replacement 2016  - Performing well on recent echo.  - Continue to monitor with periodic echocardiograms.     Diabetes on insulin  - Borderline control  - Last A1C 7.6  - Continue metformin, farxiga and insulin.  - Management per PCP.    Follow up in 6 weeks.     Encouraged him to reach out via [employer] or  telephone with any questions or concerns.    Thank you for allowing me to participate in the care of Chino Deluca .    Giuliana Enriquez PA-C, Cardiology  [facility], [facility].  [address], [address] [place] [address]  Phone: [phone]  Fax: [phone]    PLEASE NOTE: This Note was created using voice recognition Software. I have made every reasonable attempt to correct obvious errors, but I expect that there are errors of grammar and possibly content that I did not discover before finalizing the note.

## 2023-08-01 ENCOUNTER — TELEPHONE (OUTPATIENT)
Dept: CARDIOLOGY | Facility: MEDICAL CENTER | Age: 68
End: 2023-08-01
Payer: MEDICARE

## 2023-08-01 NOTE — TELEPHONE ENCOUNTER
Pt walked into clinic today requesting a return to work letter asap with no restrictions.     To AM, please advise if ok to write?

## 2023-08-08 NOTE — TELEPHONE ENCOUNTER
Giuliana Enriquez P.A.-C.  You 5 days ago     Yes        Letter drafted & given to pt by LAVERN Mcrae yesterday.

## 2023-08-16 ENCOUNTER — HOSPITAL ENCOUNTER (OUTPATIENT)
Dept: LAB | Facility: MEDICAL CENTER | Age: 68
End: 2023-08-16
Attending: PHYSICIAN ASSISTANT
Payer: MEDICARE

## 2023-08-16 DIAGNOSIS — Z79.899 HIGH RISK MEDICATION USE: ICD-10-CM

## 2023-08-16 DIAGNOSIS — I50.20 ACC/AHA STAGE C SYSTOLIC HEART FAILURE (HCC): ICD-10-CM

## 2023-08-16 DIAGNOSIS — I50.9 HEART FAILURE, NYHA CLASS 2 (HCC): ICD-10-CM

## 2023-08-16 LAB
ANION GAP SERPL CALC-SCNC: 10 MMOL/L (ref 7–16)
BUN SERPL-MCNC: 24 MG/DL (ref 8–22)
CALCIUM SERPL-MCNC: 9.2 MG/DL (ref 8.5–10.5)
CHLORIDE SERPL-SCNC: 98 MMOL/L (ref 96–112)
CO2 SERPL-SCNC: 25 MMOL/L (ref 20–33)
CREAT SERPL-MCNC: 1 MG/DL (ref 0.5–1.4)
GFR SERPLBLD CREATININE-BSD FMLA CKD-EPI: 82 ML/MIN/1.73 M 2
GLUCOSE SERPL-MCNC: 370 MG/DL (ref 65–99)
POTASSIUM SERPL-SCNC: 5.1 MMOL/L (ref 3.6–5.5)
SODIUM SERPL-SCNC: 133 MMOL/L (ref 135–145)

## 2023-08-16 PROCEDURE — 36415 COLL VENOUS BLD VENIPUNCTURE: CPT

## 2023-08-16 PROCEDURE — 80048 BASIC METABOLIC PNL TOTAL CA: CPT

## 2023-08-31 ENCOUNTER — APPOINTMENT (OUTPATIENT)
Dept: CARDIOLOGY | Facility: MEDICAL CENTER | Age: 68
End: 2023-08-31
Attending: NURSE PRACTITIONER
Payer: MEDICARE

## 2023-09-05 ENCOUNTER — OFFICE VISIT (OUTPATIENT)
Dept: CARDIOLOGY | Facility: MEDICAL CENTER | Age: 68
End: 2023-09-05
Attending: PHYSICIAN ASSISTANT
Payer: MEDICARE

## 2023-09-05 VITALS
HEIGHT: 67 IN | DIASTOLIC BLOOD PRESSURE: 72 MMHG | HEART RATE: 68 BPM | BODY MASS INDEX: 30.92 KG/M2 | SYSTOLIC BLOOD PRESSURE: 128 MMHG | OXYGEN SATURATION: 92 % | RESPIRATION RATE: 15 BRPM | WEIGHT: 197 LBS

## 2023-09-05 DIAGNOSIS — I50.20 ACC/AHA STAGE C SYSTOLIC HEART FAILURE (HCC): ICD-10-CM

## 2023-09-05 DIAGNOSIS — Z95.2 S/P MVR (MITRAL VALVE REPLACEMENT): ICD-10-CM

## 2023-09-05 DIAGNOSIS — E11.65 TYPE 2 DIABETES MELLITUS WITH HYPERGLYCEMIA, WITH LONG-TERM CURRENT USE OF INSULIN (HCC): ICD-10-CM

## 2023-09-05 DIAGNOSIS — E78.2 MIXED HYPERLIPIDEMIA: ICD-10-CM

## 2023-09-05 DIAGNOSIS — I25.5 ISCHEMIC CARDIOMYOPATHY: ICD-10-CM

## 2023-09-05 DIAGNOSIS — Z95.5 STATUS POST INSERTION OF DRUG ELUTING CORONARY ARTERY STENT: ICD-10-CM

## 2023-09-05 DIAGNOSIS — I25.10 CORONARY ARTERY DISEASE INVOLVING NATIVE CORONARY ARTERY OF NATIVE HEART WITHOUT ANGINA PECTORIS: ICD-10-CM

## 2023-09-05 DIAGNOSIS — I50.9 HEART FAILURE, NYHA CLASS 2 (HCC): ICD-10-CM

## 2023-09-05 DIAGNOSIS — I10 HTN (HYPERTENSION), MALIGNANT: ICD-10-CM

## 2023-09-05 DIAGNOSIS — Z79.4 TYPE 2 DIABETES MELLITUS WITH HYPERGLYCEMIA, WITH LONG-TERM CURRENT USE OF INSULIN (HCC): ICD-10-CM

## 2023-09-05 DIAGNOSIS — Z79.899 HIGH RISK MEDICATION USE: ICD-10-CM

## 2023-09-05 PROCEDURE — 3078F DIAST BP <80 MM HG: CPT | Performed by: NURSE PRACTITIONER

## 2023-09-05 PROCEDURE — 99212 OFFICE O/P EST SF 10 MIN: CPT | Performed by: NURSE PRACTITIONER

## 2023-09-05 PROCEDURE — 3074F SYST BP LT 130 MM HG: CPT | Performed by: NURSE PRACTITIONER

## 2023-09-05 PROCEDURE — 99214 OFFICE O/P EST MOD 30 MIN: CPT | Performed by: NURSE PRACTITIONER

## 2023-09-05 ASSESSMENT — ENCOUNTER SYMPTOMS
PALPITATIONS: 0
CLAUDICATION: 0
ORTHOPNEA: 0
SHORTNESS OF BREATH: 0
PND: 0
FEVER: 0
ABDOMINAL PAIN: 0
COUGH: 0
DIZZINESS: 0
MYALGIAS: 0

## 2023-09-05 NOTE — PATIENT INSTRUCTIONS
Increase Entresto to  mg Twice a day (can take 2 tabs of the 49-51 mg Tab twice a day until current prescription completed)     Labs in 2 weeks

## 2023-09-05 NOTE — PROGRESS NOTES
Chief Complaint   Patient presents with    Hypertension    Other     F/V Dx: Coronary artery disease involving native coronary artery of native heart without angina pectoris  Acute on chronic HFrEF (heart failure with reduced ejection fraction) ([facility])       Subjective    services were used in the patient's primary language of Arabic.     Name or Number: [id number]  Mode of interpretation: iPad    Content of Interpretation:     Chino Deluca is a 68 y.o. male who presents today for follow up on his heart failure.    Patient of Dr. Bernstein. He was last seen in clinic on 7/27/2023. During that visit, he was sent for follow-up lab testing.    Since his last visit, Patient feels well, denies chest pain, shortness of breath, palpitations, dizziness/lightheadedness, orthopnea, PND or Edema.      He reports he is not weighing himself at home.  He does not have a scale.    Patient reports he is active, he walks about an hour and a half almost daily.    He reports compliance with his medications.    He does have an echocardiogram scheduled in November.    Additionally, patient has the following medical problems:     -CAD, s/p PCI/CARLOS x2 to PDA and distal LAD in 2020    -Mitral valve replacement in 2016 at [place]    -Hypertension    -Diabetes    Past Medical History:   Diagnosis Date    Bowel habit changes     Consipation    Breath shortness     Patient reports in the past.  Pt. reports this resolved after heart surgery in 2016.    Cataract     OU-had surgery    Dental disorder     Diabetes ([facility])     Takes Insulin and oral medication    Full dentures     Glaucoma     OU Had Surgery    Heart valve disease     Pts. friend states, Heart Valve Replacement At [facility] 2016     HFrEF (heart failure with reduced ejection fraction) ([facility])     High cholesterol     Hypertension     Pneumonia     H/O 2016    Snoring      Past Surgical History:   Procedure Laterality Date    VITRECTOMY POSTERIOR Left  2023    Procedure: VITRECTOMY, ENDO LASER, LEFT EYE;  Surgeon: Deonte Miguel M.D.;  Location: SURGERY SAME DAY [facility];  Service: Ophthalmology    VITRECTOMY POSTERIOR Right 2022    Procedure: VITRECTOMY ENDOLASER, MEMBRANE PEEL, POSSIBLE GAS OR OIL RIGHT EYE;  Surgeon: Deonte Miguel M.D.;  Location: SURGERY SAME DAY [facility];  Service: Ophthalmology    OTHER Right     R arm surgery    VITRECTOMY POSTERIOR Left 2020    Procedure: VITRECTOMY, POSTERIOR PORTION- MAMBRANE PEEL POSS LASER GAS OR OIL;  Surgeon: Deonte Miguel M.D.;  Location: SURGERY SAME DAY [facility];  Service: Ophthalmology    OTHER      cataract bilateral    OTHER      Surgery For Glaucoma OU     OTHER CARDIAC SURGERY      Per pts. friend Heart valve replacement  [facility]     Family History   Problem Relation Age of Onset    No Known Problems Mother     Diabetes Father      Social History     Socioeconomic History    Marital status:      Spouse name: Not on file    Number of children: Not on file    Years of education: Not on file    Highest education level: Not on file   Occupational History    Not on file   Tobacco Use    Smoking status: Former     Current packs/day: 0.00     Average packs/day: 0.3 packs/day for 15.0 years (3.8 ttl pk-yrs)     Types: Cigarettes     Start date:      Quit date:      Years since quittin.6    Smokeless tobacco: Never   Vaping Use    Vaping Use: Never used   Substance and Sexual Activity    Alcohol use: No    Drug use: No    Sexual activity: Yes     Partners: Female   Other Topics Concern    Not on file   Social History Narrative    Not on file     Social Determinants of Health     Financial Resource Strain: Medium Risk (2021)    Overall Financial Resource Strain (CARDIA)     Difficulty of Paying Living Expenses: Somewhat hard   Food Insecurity: Food Insecurity Present (2021)    Hunger Vital Sign     Worried About Running Out of Food in the Last Year:  Sometimes true     Ran Out of Food in the Last Year: Never true   Transportation Needs: Unmet Transportation Needs (6/2/2021)    PRAPARE - Transportation     Lack of Transportation (Medical): Yes     Lack of Transportation (Non-Medical): Yes   Physical Activity: Not on file   Stress: Not on file   Social Connections: Not on file   Intimate Partner Violence: Not on file   Housing Stability: Not on file     No Known Allergies  Outpatient Encounter Medications as of 9/5/2023   Medication Sig Dispense Refill    sacubitril-valsartan (ENTRESTO)  MG Tab Take 1 Tablet by mouth 2 times a day. 60 Tablet 11    dapagliflozin propanediol (FARXIGA) 10 MG Tab Take 1 Tablet by mouth every day. 90 Tablet 3    atorvastatin (LIPITOR) 20 MG Tab Take 20 mg by mouth every day.      spironolactone (ALDACTONE) 25 MG Tab Take 1 Tablet by mouth every day. 30 Tablet 11    carvedilol (COREG) 6.25 MG Tab Take 1 Tablet by mouth 2 times a day with meals. 60 Tablet 11    metFORMIN ER (GLUCOPHAGE XR) 500 MG TABLET SR 24 HR Take 500 mg by mouth every evening. Take 2 tablets (total of 1000mg) daily with dinner.      insulin 70/30 (HUMULIN 70/30/NOVOLIN 70/30) (70-30) 100 UNIT/ML Suspension Inject 30 Units under the skin.      vitamin D (CHOLECALCIFEROL) 1000 Unit (25 mcg) Tab Take 1,000 Units by mouth every morning.      aspirin EC 81 MG EC tablet Take 1 Tab by mouth every day. (Patient taking differently: Take 81 mg by mouth every evening.) 30 Tab 11    [DISCONTINUED] sacubitril-valsartan (ENTRESTO) 49-51 MG Tab Take 1 Tablet by mouth 2 times a day. 60 Tablet 11     No facility-administered encounter medications on file as of 9/5/2023.     Review of Systems   Constitutional:  Negative for fever and malaise/fatigue.   Respiratory:  Negative for cough and shortness of breath.    Cardiovascular:  Negative for chest pain, palpitations, orthopnea, claudication, leg swelling and PND.   Gastrointestinal:  Negative for abdominal pain.  "  Musculoskeletal:  Negative for myalgias.   Neurological:  Negative for dizziness.   All other systems reviewed and are negative.             Objective     /72 (BP Location: Left arm, Patient Position: Sitting, BP Cuff Size: Adult)   Pulse 68   Resp 15   Ht 1.702 m (5' 7\")   Wt 89.4 kg (197 lb)   SpO2 92%   BMI 30.85 kg/m²     Physical Exam  Vitals reviewed.   Constitutional:       Appearance: He is well-developed. He is obese.   HENT:      Head: Normocephalic and atraumatic.   Eyes:      Pupils: Pupils are equal, round, and reactive to light.   Neck:      Vascular: No JVD.   Cardiovascular:      Rate and Rhythm: Normal rate and regular rhythm.      Heart sounds: Normal heart sounds.   Pulmonary:      Effort: Pulmonary effort is normal. No respiratory distress.      Breath sounds: Normal breath sounds. No wheezing or rales.   Abdominal:      General: Bowel sounds are normal.      Palpations: Abdomen is soft.   Musculoskeletal:         General: Normal range of motion.      Cervical back: Normal range of motion and neck supple.      Right lower leg: No edema.      Left lower leg: No edema.   Skin:     General: Skin is warm and dry.   Neurological:      General: No focal deficit present.      Mental Status: He is alert and oriented to person, place, and time.   Psychiatric:         Behavior: Behavior normal.              Lab Results   Component Value Date/Time    CHOLSTRLTOT 107 06/22/2023 09:29 AM    LDL 47 06/22/2023 09:29 AM    HDL 35 (A) 06/22/2023 09:29 AM    TRIGLYCERIDE 125 06/22/2023 09:29 AM       Lab Results   Component Value Date/Time    SODIUM 133 (L) 08/16/2023 12:45 PM    POTASSIUM 5.1 08/16/2023 12:45 PM    CHLORIDE 98 08/16/2023 12:45 PM    CO2 25 08/16/2023 12:45 PM    GLUCOSE 370 (H) 08/16/2023 12:45 PM    BUN 24 (H) 08/16/2023 12:45 PM    CREATININE 1.00 08/16/2023 12:45 PM     Lab Results   Component Value Date/Time    ALKPHOSPHAT 132 (H) 05/14/2021 05:51 AM    ASTSGOT 36 05/14/2021 " 05:51 AM    ALTSGPT 50 05/14/2021 05:51 AM    TBILIRUBIN 1.2 05/14/2021 05:51 AM      Transthoracic Echo Report 8/13/2020  No prior study is available for comparison.   Contrast was used to enhance visualization of the endocardial border.  Moderate concentric left ventricular hypertrophy.  Left ventricular ejection fraction is visually estimated to be 25%.  Akinesis of the mid to distal inferior wall, distal anterior wall , and   mid to distal septum.  [place] is mildly dyskinetic without thrombus.  A reliable estimation of diastolic function cannot be made due to   mitral valve disease.  Known mitral valve replacement which is functioning normally with   appropriate transvalvular gradient.  Mean transvalvular gradient is 4  mmHg at a heart rate of 83 BPM.  Mild aortic insufficiency.  Aortic sclerosis without stenosis.  Unable to estimate pulmonary artery pressure due to an inadequate   tricuspid regurgitant jet.  A reliable estimation of diastolic function cannot be made due to   mitral valve disease.  Normal pericardium without effusion.  Results via [place] text to ordering physician at 08:42 hrs     Coronary angiogram  Procedure date: 8/19/2020     Referring physician: Dr. Tamy Chairez     Pre-operative Diagnosis:  Two-vessel coronary disease (total occlusion of mid left anterior descending artery and 90% mid posterior descending artery stenosis) with new onset systolic heart failure and prior mitral valve replacement felt to be for poor surgical candidate for coronary bypass grafting     Post-operative Diagnosis:   1.  Status post stenting of posterior descending artery with 2.25 x 24 mm Synergy drug eluting stent with no significant residual stenosis and EL-3 flow  2.  Status post balloon angioplasty and stenting of mid to distal left anterior descending artery with 2 x 30 mm Cain drug eluting stent with no significant restenosis in the stented segment but residual diffuse disease in the distal left anterior  descending artery with EL-3 flow     Procedure:  1.  Percutaneous intervention of the posterior descending artery of the right coronary artery  2.  Percutaneous coronary intervention of the left anterior descending artery  3.  Supervision of moderate conscious sedation    Transthoracic Echo Report 5/14/2021  Prior echo 8/13/20, the EF has mildly improved.  Left ventricular ejection fraction is visually estimated to be 35%.  Mild aortic stenosis.  Unable to estimate pulmonary artery pressure due to an inadequate   tricuspid regurgitant jet.    Transthoracic Echo Report 5/23/2023  Compared to the prior study on 5/14/2021, there is slight improvement   in left ventricular systolic function.  Moderate concentric left ventricular hypertrophy. Moderately reduced   left ventricular systolic function. The left ventricular ejection   fraction is visually estimated to be 40%. Global hypokinesis with   regional variation.   Known mitral valve bioprosthesis with appropriate transvalvular   gradient. Mean transvalvular gradient is 5.30 mmHg at a heart rate of   67 BPM. There appears to be a mobile calcified structure on what would   be the anterior leaflet which was seen in prior study.  Normal aortic root for body surface area. The ascending aorta diameter   is 3.1 cm.      Assessment & Plan     1. ACC/AHA stage C systolic heart failure (HCC)  EKG    sacubitril-valsartan (ENTRESTO)  MG Tab    Basic Metabolic Panel    proBrain Natriuretic Peptide, NT    CANCELED: EKG      2. Heart failure, NYHA class 2 (HCC)        3. Ischemic cardiomyopathy        4. Coronary artery disease involving native coronary artery of native heart without angina pectoris        5. Status post insertion of drug eluting coronary artery stent        6. S/P MVR (mitral valve replacement)        7. HTN (hypertension), malignant        8. High risk medication use        9. Mixed hyperlipidemia        10. Type 2 diabetes mellitus with hyperglycemia,  with long-term current use of insulin (HCC)            Medical Decision Making: Today's Assessment/Status/Plan:        HFrEF, Stage C, Class 2, LVEF 40%: Based on physical examination findings, patient is euvolemic. No JVD, lungs are clear to auscultation, no pitting edema in bilateral lower extremities, no ascites.   -Heart failure due to Ischemic Cardiomyopathy  -Discussed Heart failure trajectory and prognosis with patient. Will continue to optimize medical therapy as tolerated. Advanced HF treatment, need for remote monitoring consideration at every visit.   -ACE-I/ARB/ARNI: Increase Entresto to  mg BID  -Evidence Based Beta-blocker: Continue carvedilol 6.25 mg twice a day  -Aldosterone Antagonist: Continue spironolactone 25 mg daily  -Diuretic: Currently not on diuretic  -SGLT2 inhibitor: Continue Farxiga 10 mg daily  -Other: Consider as needed (Hydralazine/Isosorbide, Vericiguat, Corlanor)  -Labs: BMP, NT proBNP-Labs Ordered, to be done in 2 weeks after increasing Entresto, Will continue to closely monitor for side effects of patient's high risk medication(s) including renal function, liver function NTproBNP/cardiac markers, and electrolytes as needed  -discussed importance of exercise and regular activity  -Discussed/reviewed maintaining a low Sodium and hydration recommendations  -Repeat Echo in 2 months, scheduled for November, if LVEF not >35%, then will discuss/consider ICD for primary Prevention.  -Reinforced s/sx of worsening heart failure with patient and weight monitoring. Pt verbalizes understanding. Pt to call office or RTC if present.    -PUMP line number [phone] (PUMP) reviewed with patient  -Heart Failure Education:   Discussed the Definition of heart failure (linking disease, symptoms, and treatment) and causes of heart failure  Recognition of escalating symptoms and concrete plan for response to particular symptoms  Discussed the indication for ACE-I/ARB/ARNI  Risk modification for heart  failure progression  Specific diet recommendations: Continue low-sodium diet and adequate hydration  Continue activity  Importance of treatment adherence  Behavioral strategies to promote treatment adherence  -Advanced care planning: Advance directive and POLST to be discussed at future visit  -Pharmacotherapy Referral: Not referred, but can consider referral to pharmacotherapy  -Compliance Barriers: None  -Genetic testing Consideration: None  -Consideration for LVAD and/or Heart transplant as necessary        CAD, s/p PCI/CARLOS x2 to the PDA and distal LAD in 2020:  -Continue aspirin 81 mg daily  -Continue atorvastatin 20 mg daily  -Last LDL 47 on 6/22/2023, goal < 70  -On SGLT2 inhibitor    Hypertension: Stable  -Recommendations per above    Hyperlipidemia:  -Continue atorvastatin 20 mg daily    History of for mitral valve replacement in 2016:  -Will follow echo, scheduled in November    Diabetes: Defer  -Patient to get back in with PCP to discuss  -Continue current regimen    FU in clinic in 3 months after echo. Sooner if needed.    Patient verbalizes understanding and agrees with the plan of care.     PLEASE NOTE: This Note was created using voice recognition Software. I have made every reasonable attempt to correct obvious errors, but I expect that there are errors of grammar and possibly content that I did not discover before finalizing the note

## 2023-09-11 ENCOUNTER — TELEPHONE (OUTPATIENT)
Dept: CARDIOLOGY | Facility: MEDICAL CENTER | Age: 68
End: 2023-09-11
Payer: MEDICARE

## 2023-09-11 NOTE — LETTER
[facility]  [facility] R, [facility]   [address], [address]  [place] [id number] [phone]   Fax: [phone]   Authorization for Release/Disclosure of   Protected Health Information   Name: PATRICIO HAMMONDS : 1955 SSN: [id number]   Address: [address] [place] [address] Phone:    [phone] (home) [phone] (work)   I authorize the entity listed below to release/disclose the PHI below to:   [facility]/ [facility]   Provider or Entity Name:     Address   City, State, Zip   Phone:      Fax:     Reason for request: continuity of care   Information to be released: Valve replacement records from 2016           [  ] Check here and initial the line next to each item to release ALL health information INCLUDING  _____ Care and treatment for drug and / or alcohol abuse  _____ HIV testing, infection status, or AIDS  _____ Genetic Testing    DATES OF SERVICE OR TIME PERIOD TO BE DISCLOSED: _____________  I understand and acknowledge that:  * This Authorization may be revoked at any time by you in writing, except if your health information has already been used or disclosed.  * Your health information that will be used or disclosed as a result of you signing this authorization could be re-disclosed by the recipient. If this occurs, your re-disclosed health information may no longer be protected by State or Federal laws.  * You may refuse to sign this Authorization. Your refusal will not affect your ability to obtain treatment.  * This Authorization becomes effective upon signing and will  on (date) __________.      If no date is indicated, this Authorization will  one (1) year from the signature date.      Reason for request: continuity of care Date:     2023       PLEASE FAX REQUESTED RECORDS BACK TO: [phone]

## 2023-09-11 NOTE — TELEPHONE ENCOUNTER
Records requested from from [place] in 2016 for valve replacement per KJ.   Pending records.

## 2023-09-29 ENCOUNTER — HOSPITAL ENCOUNTER (OUTPATIENT)
Dept: LAB | Facility: MEDICAL CENTER | Age: 68
End: 2023-09-29
Attending: NURSE PRACTITIONER
Payer: MEDICARE

## 2023-09-29 DIAGNOSIS — I50.20 ACC/AHA STAGE C SYSTOLIC HEART FAILURE (HCC): ICD-10-CM

## 2023-09-29 LAB
ANION GAP SERPL CALC-SCNC: 11 MMOL/L (ref 7–16)
BUN SERPL-MCNC: 18 MG/DL (ref 8–22)
CALCIUM SERPL-MCNC: 8.8 MG/DL (ref 8.5–10.5)
CHLORIDE SERPL-SCNC: 103 MMOL/L (ref 96–112)
CO2 SERPL-SCNC: 24 MMOL/L (ref 20–33)
CREAT SERPL-MCNC: 0.98 MG/DL (ref 0.5–1.4)
GFR SERPLBLD CREATININE-BSD FMLA CKD-EPI: 84 ML/MIN/1.73 M 2
GLUCOSE SERPL-MCNC: 108 MG/DL (ref 65–99)
POTASSIUM SERPL-SCNC: 4.9 MMOL/L (ref 3.6–5.5)
SODIUM SERPL-SCNC: 138 MMOL/L (ref 135–145)

## 2023-09-29 PROCEDURE — 36415 COLL VENOUS BLD VENIPUNCTURE: CPT

## 2023-09-29 PROCEDURE — 80048 BASIC METABOLIC PNL TOTAL CA: CPT

## 2023-11-30 ENCOUNTER — HOSPITAL ENCOUNTER (OUTPATIENT)
Dept: CARDIOLOGY | Facility: MEDICAL CENTER | Age: 68
End: 2023-11-30
Attending: INTERNAL MEDICINE
Payer: MEDICARE

## 2023-11-30 DIAGNOSIS — R06.09 DYSPNEA ON EXERTION: ICD-10-CM

## 2023-11-30 DIAGNOSIS — I50.20 ACC/AHA STAGE C SYSTOLIC HEART FAILURE (HCC): ICD-10-CM

## 2023-11-30 DIAGNOSIS — I25.5 ISCHEMIC CARDIOMYOPATHY: ICD-10-CM

## 2023-11-30 DIAGNOSIS — I50.9 HEART FAILURE, NYHA CLASS 2 (HCC): ICD-10-CM

## 2023-11-30 PROCEDURE — 93306 TTE W/DOPPLER COMPLETE: CPT

## 2023-12-01 LAB
LV EJECT FRACT  99904: 40
LV EJECT FRACT MOD 2C 99903: 42.43
LV EJECT FRACT MOD 4C 99902: 38.04
LV EJECT FRACT MOD BP 99901: 36.38

## 2023-12-01 PROCEDURE — 93306 TTE W/DOPPLER COMPLETE: CPT | Mod: 26 | Performed by: INTERNAL MEDICINE

## 2023-12-02 DIAGNOSIS — I50.20 ACC/AHA STAGE C SYSTOLIC HEART FAILURE (HCC): ICD-10-CM

## 2023-12-02 NOTE — TELEPHONE ENCOUNTER
Is the patient due for a refill? No, pharmacy requesting 90 day supply    Was the patient seen the past year? Yes    Date of last office visit: 09/05/23    Does the patient have an upcoming appointment?  Yes   If yes, When? 12/05/23    Provider to refill:KJ    Does the patients insurance require a 100 day supply?  No

## 2023-12-09 NOTE — RESULT ENCOUNTER NOTE
Dear Kaylyn,    Can you please let Chino Deluca know that result is not entirely normal and I will see patient as scheduled to discuss?    Thank you,  Fracisco.

## 2023-12-12 ENCOUNTER — TELEPHONE (OUTPATIENT)
Dept: CARDIOLOGY | Facility: MEDICAL CENTER | Age: 68
End: 2023-12-12
Payer: MEDICARE

## 2023-12-12 NOTE — TELEPHONE ENCOUNTER
Message  Received: 4 days ago  Pam ToPAUL R.N.  Dear Kaylyn,    Can you please let Chion Anh Deluca know that result is not entirely normal and I will see patient as scheduled to discuss?    Thank you,  Fracisco.          EC-ECHOCARDIOGRAM COMPLETE W/O CONT  -------------------------------------------------------------------------------------------------    Called pt using  service, informed pt of the above and transferred to scheduling.

## 2025-03-21 ENCOUNTER — PATIENT OUTREACH (OUTPATIENT)
Dept: CARDIOLOGY | Facility: MEDICAL CENTER | Age: 70
End: 2025-03-21
Payer: MEDICARE

## 2025-03-21 DIAGNOSIS — I25.10 CORONARY ARTERY DISEASE INVOLVING NATIVE CORONARY ARTERY OF NATIVE HEART WITHOUT ANGINA PECTORIS: ICD-10-CM

## (undated) DEVICE — MASK, LARYNGEAL AIRWAY #4

## (undated) DEVICE — KIT  I.V. START (100EA/CA)

## (undated) DEVICE — NEEDLE FILTER ASPIRATION 18 GA X 1 1/2 IN (100EA/BX)

## (undated) DEVICE — PACK VITRECTOMY (1EA/CA)

## (undated) DEVICE — CANNULA SUB-TENONS ANESTH. 1.1X25MM 19GAX1IN (10EA/SP)

## (undated) DEVICE — WATER IRRIGATION STERILE 1000ML (12EA/CA)

## (undated) DEVICE — PACK VITRECTOMY 23G 10K BEVELED (1EA/BX)

## (undated) DEVICE — SHIELD OPTH AL GRTR CVR FOX (50EA/BX)

## (undated) DEVICE — SYRINGE DISP. 60 CC LL - (30/BX, 12BX/CA)**WHEN THESE ARE GONE ORDER #500206**

## (undated) DEVICE — CATHETER IV 20 GA X 1-1/4 ---SURG.& SDS ONLY--- (50EA/BX)

## (undated) DEVICE — MASK AIRWAY FLEXIBLE SINGLE-USE SIZE 4 ADULTS (10EA/BX)

## (undated) DEVICE — CANISTER SUCTION 3000ML MECHANICAL FILTER AUTO SHUTOFF MEDI-VAC NONSTERILE LF DISP  (40EA/CA)

## (undated) DEVICE — Device

## (undated) DEVICE — GLOVE BIOGEL SZ 7.5 SURGICAL PF LTX - (50PR/BX 4BX/CA)

## (undated) DEVICE — PAD EYE GAUZE COVERED OVAL 1 5/8 X 2 5/8" STERILE"

## (undated) DEVICE — KIT ANESTHESIA W/CIRCUIT & 3/LT BAG W/FILTER (20EA/CA)

## (undated) DEVICE — TUBING CLEARLINK DUO-VENT - C-FLO (48EA/CA)

## (undated) DEVICE — TUBE CONNECTING SUCTION - CLEAR PLASTIC STERILE 72 IN (50EA/CA)

## (undated) DEVICE — SENSOR OXIMETER ADULT SPO2 RD SET (20EA/BX)

## (undated) DEVICE — SET LEADWIRE 5 LEAD BEDSIDE DISPOSABLE ECG (1SET OF 5/EA)

## (undated) DEVICE — LACTATED RINGERS INJ 1000 ML - (14EA/CA 60CA/PF)

## (undated) DEVICE — SENSOR SPO2 NEO LNCS ADHESIVE (20/BX) SEE USER NOTES

## (undated) DEVICE — TOWEL STOP TIMEOUT SAFETY FLAG (40EA/CA)

## (undated) DEVICE — SUCTION INSTRUMENT YANKAUER BULBOUS TIP W/O VENT (50EA/CA)

## (undated) DEVICE — SLEEVE, VASO, THIGH, MED

## (undated) DEVICE — SUTURE GENERAL

## (undated) DEVICE — CANISTER SUCTION RIGID RED 1500CC (40EA/CA)

## (undated) DEVICE — MASK ANESTHESIA ADULT  - (100/CA)

## (undated) DEVICE — CANNULA DIVIDED ADULT CO2 - SAMPLE W/FEMALE CONNCT (25/CA)

## (undated) DEVICE — FORCEP ILM 23GA DISP REVOLUT. - (6/BX)

## (undated) DEVICE — SYRINGE NON SAFETY 10 CC 20 GA X 1-1/2 IN (100/BX 4BX/CA)

## (undated) DEVICE — SLEEVE VASO CALF MED - (10PR/CA)

## (undated) DEVICE — GOWN WARMING STANDARD FLEX - (30/CA)

## (undated) DEVICE — BACKFLUSH SOFT TIP 23GA - (6/BX)

## (undated) DEVICE — SODIUM CHL IRRIGATION 0.9% 1000ML (12EA/CA)

## (undated) DEVICE — PROBE 23 GA ILLUM FLEX CURVED - LASER(6/BX)

## (undated) DEVICE — PACK EYE VFC TUBING CONST - CONSTELLATION (6/BX)

## (undated) DEVICE — CANNULA STRAIGHT 23G X 1 1/4IN - W/BLUNT (10/BX)

## (undated) DEVICE — CANNULA W/ SUPPLY TUBING O2 - (50/CA)

## (undated) DEVICE — ELECTRODE 850 FOAM ADHESIVE - HYDROGEL RADIOTRNSPRNT (50/PK)

## (undated) DEVICE — SUTURE 7-0 VICRYL TG140-8 (12PK/BX)

## (undated) DEVICE — CANNULA INJECTION 27G (EYE) - 10/BX ALCON

## (undated) DEVICE — GOWN SURGEONS LARGE - (32/CA)

## (undated) DEVICE — CORDS BIPOLAR COAGULATION - 12FT STERILE DISP. (10EA/BX)

## (undated) DEVICE — PROBE DIATHERMY DISP 25G (6EA/BX)

## (undated) DEVICE — SYRINGE SAFETY 10 ML 18 GA X 1 1/2 BLUNT LL (100/BX 4BX/CA)

## (undated) DEVICE — PROTECTOR ULNA NERVE - (36PR/CA)

## (undated) DEVICE — MASK OXYGEN VNYL ADLT MED CONC WITH 7 FOOT TUBING  - (50EA/CA)

## (undated) DEVICE — SUTURE EYE

## (undated) DEVICE — PACK VITRECTOMY (4EA/CA)